# Patient Record
Sex: FEMALE | Race: BLACK OR AFRICAN AMERICAN | Employment: OTHER | URBAN - METROPOLITAN AREA
[De-identification: names, ages, dates, MRNs, and addresses within clinical notes are randomized per-mention and may not be internally consistent; named-entity substitution may affect disease eponyms.]

---

## 2017-01-03 ENCOUNTER — APPOINTMENT (OUTPATIENT)
Dept: GENERAL RADIOLOGY | Age: 77
End: 2017-01-03
Attending: PHYSICAL MEDICINE & REHABILITATION
Payer: MEDICARE

## 2017-01-03 PROCEDURE — 76000 FLUOROSCOPY <1 HR PHYS/QHP: CPT

## 2017-01-03 PROCEDURE — 72020 X-RAY EXAM OF SPINE 1 VIEW: CPT

## 2017-01-28 ENCOUNTER — HOSPITAL ENCOUNTER (OUTPATIENT)
Dept: MRI IMAGING | Age: 77
Discharge: HOME | End: 2017-01-28
Attending: PHYSICAL MEDICINE & REHABILITATION
Payer: MEDICARE

## 2017-01-28 DIAGNOSIS — M51.36 DDD (DEGENERATIVE DISC DISEASE), LUMBAR: ICD-10-CM

## 2017-01-28 DIAGNOSIS — M54.30 SCIATICA: ICD-10-CM

## 2017-01-28 PROCEDURE — 72148 MRI LUMBAR SPINE W/O DYE: CPT

## 2017-03-07 ENCOUNTER — OFFICE VISIT (OUTPATIENT)
Dept: INTERNAL MEDICINE CLINIC | Age: 77
End: 2017-03-07

## 2017-03-07 VITALS
OXYGEN SATURATION: 96 % | DIASTOLIC BLOOD PRESSURE: 84 MMHG | BODY MASS INDEX: 30.25 KG/M2 | HEIGHT: 61 IN | WEIGHT: 160.2 LBS | SYSTOLIC BLOOD PRESSURE: 142 MMHG | HEART RATE: 92 BPM | TEMPERATURE: 98.4 F

## 2017-03-07 DIAGNOSIS — M54.31 BILATERAL SCIATICA: ICD-10-CM

## 2017-03-07 DIAGNOSIS — Z00.00 ROUTINE GENERAL MEDICAL EXAMINATION AT A HEALTH CARE FACILITY: ICD-10-CM

## 2017-03-07 DIAGNOSIS — M85.80 OSTEOPENIA: ICD-10-CM

## 2017-03-07 DIAGNOSIS — Z13.39 SCREENING FOR ALCOHOLISM: ICD-10-CM

## 2017-03-07 DIAGNOSIS — M54.32 BILATERAL SCIATICA: ICD-10-CM

## 2017-03-07 DIAGNOSIS — I10 ESSENTIAL HYPERTENSION, BENIGN: ICD-10-CM

## 2017-03-07 DIAGNOSIS — Z23 ENCOUNTER FOR IMMUNIZATION: Primary | ICD-10-CM

## 2017-03-07 NOTE — PATIENT INSTRUCTIONS
Schedule of Personalized Health Plan - male  (Provide Copy to Patient)  The best way to stay healthy is to live a healthy lifestyle. A healthy lifestyle includes regular exercise, eating a well-balanced diet, keeping a healthy weight and not smoking. Regular physical exams and screening tests are another important way to take care of yourself. Preventive exams provided by health care providers can find health problems early when treatment works best and can keep you from getting certain diseases or illnesses. Preventive services include exams, lab tests, screenings, shots, monitoring and information to help you take care of your own health. All people over 65 should have a pneumonia shot. Pneumonia shots are usually only needed once in a lifetime unless your doctor decides differently. All people over 65 should have a yearly flu shot. People over 65 are at medium to high risk for Hepatitis B. Three shots are needed for complete protection. In addition to your physical exam, some screening tests are recommended:    Bone mass measurement (dexa scan) is recommended every two years if you have certain risk factors, such as personal history of vertebral fracture or chronic steroid medication use    Diabetes Mellitus screening is recommended every year. Glaucoma is an eye disease caused by high pressure in the eye. An eye exam is recommended every year. Cardiovascular screening tests that check your cholesterol and other blood fat (lipid) levels are recommended every five years. Colorectal Cancer screening tests help to find pre-cancerous polyps (growths in the colon) so they can be removed before they turn into cancer. Tests ordered for screening depend on your personal and family history risk factors.     Screening for Prostate Cancer is recommended yearly with a digital rectal exam and/or a PSA test    Here is a list of your current Health Maintenance items with a due date:  Health Maintenance Due   Topic Date Due    DTaP/Tdap/Td series (1 - Tdap) 03/28/1961    ZOSTER VACCINE AGE 60>  03/28/2000    Pneumococcal 65+ Low/Medium Risk (2 of 2 - PCV13) 11/20/2015    INFLUENZA AGE 9 TO ADULT  08/01/2016    MEDICARE YEARLY EXAM  01/12/2017

## 2017-03-07 NOTE — PROGRESS NOTES
HISTORY OF PRESENT ILLNESS  Maya Moncada is a 68 y.o. female. HPI     F/u HTN Prediabetes  Had lumbar ESTEVAN in January but will get another soon--Dr Mary Villanueva thi month  Still has sciatic pain in both legs  Able to walk for exercise around her yard  Still works full time  No cp or sob sxs    Patient Active Problem List    Diagnosis Date Noted    Prediabetes 08/30/2016    BPPV (benign paroxysmal positional vertigo) 03/19/2016    Osteopenia 01/12/2016    DDD (degenerative disc disease), lumbar 02/28/2014    Essential hypertension, benign 02/21/2010    Osteoporosis 02/21/2010    Depression 02/21/2010     Current Outpatient Prescriptions   Medication Sig Dispense Refill    lisinopril-hydroCHLOROthiazide (PRINZIDE, ZESTORETIC) 20-12.5 mg per tablet TAKE TWO TABLETS BY MOUTH DAILY 60 Tab 11    timolol (TIMOPTIC) 0.5 % ophthalmic solution       amLODIPine (NORVASC) 5 mg tablet Take 1 Tab by mouth daily. 30 Tab 11    ondansetron hcl (ZOFRAN, AS HYDROCHLORIDE,) 4 mg tablet Take 1 Tab by mouth every eight (8) hours as needed for Nausea. 15 Tab 1    meclizine (ANTIVERT) 25 mg tablet Take 1 Tab by mouth three (3) times daily as needed for Dizziness. 30 Tab 0    cholecalciferol (VITAMIN D3) 1,000 unit tablet Take 1,000 Units by mouth daily.  MULTIVITAMINS W-MINERALS/LUT (CENTRUM SILVER PO) Take 1 Tab by mouth daily.  aspirin 81 mg Tab Take 40.5 mg by mouth daily.  CALCIUM CARBONATE (CALTRATE 600 PO) Take 1 Tab by mouth two (2) times a day.  diazepam (VALIUM) 2 mg tablet Take one tablet by mouth every 8 hours as needed for severe dizziness 18 Tab 1    ketoconazole (NIZORAL) 2 % topical cream Apply  to affected area daily. 30 g 0    povidone-iodine (BETADINE) 10 % external solution Apply topically to scalp for about 5 min, twice a week.  1 Bottle 1     No Known Allergies   Lab Results  Component Value Date/Time   Hemoglobin A1c 6.1 08/30/2016 03:45 PM   Hemoglobin A1c 6.0 03/19/2016 03:51 AM Glucose 91 08/30/2016 03:45 PM   LDL, calculated 105.4 03/19/2016 03:51 AM   Creatinine 0.78 08/30/2016 03:45 PM      Lab Results  Component Value Date/Time   Cholesterol, total 188 03/19/2016 03:51 AM   HDL Cholesterol 61 03/19/2016 03:51 AM   LDL, calculated 105.4 03/19/2016 03:51 AM   Triglyceride 108 03/19/2016 03:51 AM   CHOL/HDL Ratio 3.1 03/19/2016 03:51 AM       Lab Results  Component Value Date/Time   GFR est AA 85 08/30/2016 03:45 PM   GFR est non-AA 74 08/30/2016 03:45 PM   Creatinine 0.78 08/30/2016 03:45 PM   BUN 14 08/30/2016 03:45 PM   Sodium 145 08/30/2016 03:45 PM   Potassium 4.0 08/30/2016 03:45 PM   Chloride 103 08/30/2016 03:45 PM   CO2 25 08/30/2016 03:45 PM         ROS    Physical Exam   Constitutional: She appears well-developed and well-nourished. Appears stated age   Cardiovascular: Normal rate, regular rhythm and normal heart sounds. Exam reveals no gallop and no friction rub. No murmur heard. Pulmonary/Chest: Effort normal and breath sounds normal. No respiratory distress. She has no wheezes. Abdominal: Soft. Bowel sounds are normal.   Musculoskeletal: She exhibits no edema. Neurological: She is alert. Psychiatric: She has a normal mood and affect. Nursing note and vitals reviewed. ASSESSMENT and PLAN  Dyllan Shi was seen today for annual wellness visit, hypertension and blood sugar problem. Diagnoses and all orders for this visit:    Encounter for immunization  -     Influenza virus vaccine ([address] 80) 72 years and older    Routine general medical examination at a health care facility    Screening for alcoholism    Bilateral sciatica    Essential hypertension, benign    Osteopenia      Follow-up Disposition:  Return in about 6 months (around 9/7/2017) for htn prediabetes.   This is a Subsequent Medicare Annual Wellness Visit providing Personalized Prevention Plan Services (PPPS) (Performed 12 months after initial AWV and PPPS )    I have reviewed the patient's medical history in detail and updated the computerized patient record. History     Past Medical History:   Diagnosis Date    Hypertension     Vertigo       Past Surgical History:   Procedure Laterality Date    Levonia Kobus  11/24/2015         COLORECTAL SCRN; HI RISK IND  11/24/2015         HX HYSTERECTOMY      HX OTHER SURGICAL      Fatty tumor removed L shoulder 2008    [place] COLSC FLX W/RMVL OF TUMOR POLYP LESION SNARE TQ  6/8/2010          Current Outpatient Prescriptions   Medication Sig Dispense Refill    lisinopril-hydroCHLOROthiazide (PRINZIDE, ZESTORETIC) 20-12.5 mg per tablet TAKE TWO TABLETS BY MOUTH DAILY 60 Tab 11    timolol (TIMOPTIC) 0.5 % ophthalmic solution       amLODIPine (NORVASC) 5 mg tablet Take 1 Tab by mouth daily. 30 Tab 11    ondansetron hcl (ZOFRAN, AS HYDROCHLORIDE,) 4 mg tablet Take 1 Tab by mouth every eight (8) hours as needed for Nausea. 15 Tab 1    meclizine (ANTIVERT) 25 mg tablet Take 1 Tab by mouth three (3) times daily as needed for Dizziness. 30 Tab 0    cholecalciferol (VITAMIN D3) 1,000 unit tablet Take 1,000 Units by mouth daily.  MULTIVITAMINS W-MINERALS/LUT (CENTRUM SILVER PO) Take 1 Tab by mouth daily.  aspirin 81 mg Tab Take 40.5 mg by mouth daily.  CALCIUM CARBONATE (CALTRATE 600 PO) Take 1 Tab by mouth two (2) times a day.  diazepam (VALIUM) 2 mg tablet Take one tablet by mouth every 8 hours as needed for severe dizziness 18 Tab 1    ketoconazole (NIZORAL) 2 % topical cream Apply  to affected area daily. 30 g 0    povidone-iodine (BETADINE) 10 % external solution Apply topically to scalp for about 5 min, twice a week.  1 Bottle 1     No Known Allergies  Family History   Problem Relation Age of Onset    Diabetes Mother     Heart Disease Mother     Hypertension Mother     Hypertension Father     Arthritis-rheumatoid Sister     Cancer Sister      stomach    Hypertension Sister     Arthritis-rheumatoid Brother     Hypertension Brother     Stroke Sister     Kidney Disease Brother     Cancer Daughter      colon     Social History   Substance Use Topics    Smoking status: Never Smoker    Smokeless tobacco: Never Used    Alcohol use No     Patient Active Problem List   Diagnosis Code    Essential hypertension, benign I10    Osteoporosis M81.0    Depression F32.9    DDD (degenerative disc disease), lumbar M51.36    Osteopenia M85.80    BPPV (benign paroxysmal positional vertigo) H81.10    Prediabetes R73.03       Depression Risk Factor Screening:     PHQ 2 / 9, over the last two weeks 3/7/2017   Little interest or pleasure in doing things Not at all   Feeling down, depressed or hopeless Not at all   Total Score PHQ 2 0     Alcohol Risk Factor Screening: On any occasion during the past 3 months, have you had more than 3 drinks containing alcohol? No    Do you average more than 7 drinks per week? No      Functional Ability and Level of Safety:     Hearing Loss   normal-to-mild    Activities of Daily Living   Self-care. Requires assistance with: no ADLs    Fall Risk     Fall Risk Assessment, last 12 mths 3/7/2017   Able to walk? Yes   Fall in past 12 months? No   Fall with injury? -   Number of falls in past 12 months -   Fall Risk Score -     Abuse Screen   None  Patient is not abused    Review of Systems   A comprehensive review of systems was negative except for that written in the HPI. Physical Examination     Evaluation of Cognitive Function:  Mood/affect:  happy  Appearance: age appropriate, well dressed and within normal Limits  Family member/caregiver input:     Visit Vitals    /84 (BP 1 Location: Left arm, BP Patient Position: Sitting)    Pulse 92    Temp 98.4 °F (36.9 °C) (Oral)    Ht 5' 1\" (1.549 m)    Wt 160 lb 3.2 oz (72.7 kg)    SpO2 96%    BMI 30.27 kg/m2     General:  Alert, cooperative, no distress, appears stated age. Head:  Normocephalic, without obvious abnormality, atraumatic. Eyes:  Conjunctivae/corneas clear. PERRL, EOMs intact. Fundi benign. Ears:  Normal TMs and external ear canals both ears. Nose: Nares normal. Septum midline. Mucosa normal. No drainage or sinus tenderness. Throat: Lips, mucosa, and tongue normal. Teeth and gums normal.   Neck: Supple, symmetrical, trachea midline, no adenopathy, thyroid: no enlargement/tenderness/nodules, no carotid bruit and no JVD. Back:   Symmetric, no curvature. ROM normal. No CVA tenderness. Lungs:   Clear to auscultation bilaterally. Chest wall:  No tenderness or deformity. Heart:  Regular rate and rhythm, S1, S2 normal, no murmur, click, rub or gallop. Breast Exam:  No tenderness, masses, or nipple abnormality. Abdomen:   Soft, non-tender. Bowel sounds normal. No masses,  No organomegaly. Genitalia:  Normal female without lesion, discharge or tenderness. Rectal:  Normal tone,  no masses or tenderness  Guaiac negative stool. Extremities: Extremities normal, atraumatic, no cyanosis or edema. Pulses: 2+ and symmetric all extremities. Skin: Skin color, texture, turgor normal. No rashes or lesions. Lymph nodes: Cervical, supraclavicular, and axillary nodes normal.   Neurologic: CNII-XII intact. Normal strength, sensation and reflexes throughout.        Patient Care Team:  Trinity Chandler MD as PCP - General Veronica Norman RN as Nurse Navigator  Dieter Moreno MD (Gastroenterology)  Jag Bach MD (Physical Medicine and Rehab)  Alondra Dawson MD (Orthopedic Surgery)  Junior Miah MD (Ophthalmology)  Dr. Jaleel Ferrell (Optometry)  Abdullahi Oconnor, NAIF as Ambulatory Care Navigator    Advice/Referrals/Counseling   Education and counseling provided:  End-of-Life planning (with patient's consent)--see ACP note  Pneumococcal Vaccine--prevnar 13 at local pharmacy advised  Influenza Vaccine--high dose flu shot today  Osteopenia--will discuss repeat dexa at future visit    Assessment/Plan   Alisia Nixon was seen today for annual wellness visit, hypertension and blood sugar problem. Diagnoses and all orders for this visit:    Encounter for immunization  -     Influenza virus vaccine ([address] 80) 72 years and older    HTN   controlled    Sciatica   F/u Dr Isabel Fu    Osteopenia   Continue calcium and vit d      RTC 6 months  Follow-up Disposition: Not on File.

## 2017-03-07 NOTE — MR AVS SNAPSHOT
Visit Information Date & Time Provider Department Dept. Phone Encounter #  
 3/7/2017  9:15 AM Gonzalo Yeh, [address] [phone] [id number] Follow-up Instructions Return in about 6 months (around 9/7/2017) for htn prediabetes. Upcoming Health Maintenance Date Due DTaP/Tdap/Td series (1 - Tdap) 3/28/1961 ZOSTER VACCINE AGE 60> 3/28/2000 Pneumococcal 65+ Low/Medium Risk (2 of 2 - PCV13) 11/20/2015 INFLUENZA AGE 9 TO ADULT 8/1/2016 MEDICARE YEARLY EXAM 1/12/2017 BREAST CANCER SCRN MAMMOGRAM 8/25/2017 GLAUCOMA SCREENING Q2Y 10/31/2018 COLONOSCOPY 11/24/2020 Allergies as of 3/7/2017  Review Complete On: 1/3/2017 By: Fabricio Scott RN No Known Allergies Current Immunizations  Never Reviewed Name Date Influenza High Dose Vaccine PF  Incomplete Influenza Vaccine 9/25/2014, 1/7/2014 Influenza Vaccine (Quad) PF 3/21/2016  4:50 PM  
 Influenza Vaccine Split 1/5/2011 Pneumococcal Polysaccharide (PPSV-23) 11/20/2014 Not reviewed this visit You Were Diagnosed With   
  
 Codes Comments Encounter for immunization    -  Primary ICD-10-CM: T04 ICD-9-CM: V03.89 Routine general medical examination at a health care facility     ICD-10-CM: Z00.00 ICD-9-CM: V70.0 Screening for alcoholism     ICD-10-CM: Z13.89 ICD-9-CM: V79.1 Vitals BP Pulse Temp Height(growth percentile) Weight(growth percentile) SpO2  
 142/84 (BP 1 Location: Left arm, BP Patient Position: Sitting) 92 98.4 °F (36.9 °C) (Oral) 5' 1\" (1.549 m) 160 lb 3.2 oz (72.7 kg) 96% BMI OB Status Smoking Status 30.27 kg/m2 Hysterectomy Never Smoker BMI and BSA Data Body Mass Index Body Surface Area  
 30.27 kg/m 2 1.77 m 2 Preferred Pharmacy Pharmacy Name Phone [facility] [address] , [address] [place] [address] [phone] Your Updated Medication List  
  
   
 This list is accurate as of: 3/7/17 10:24 AM.  Always use your most recent med list. amLODIPine 5 mg tablet Commonly known as:  [facility] Take 1 Tab by mouth daily. aspirin 81 mg tablet Take 40.5 mg by mouth daily. CALTRATE 600 PO Take 1 Tab by mouth two (2) times a day. CENTRUM SILVER PO Take 1 Tab by mouth daily. cholecalciferol 1,000 unit tablet Commonly known as:  VITAMIN D3 Take 1,000 Units by mouth daily. diazePAM 2 mg tablet Commonly known as:  VALIUM Take one tablet by mouth every 8 hours as needed for severe dizziness  
  
 ketoconazole 2 % topical cream  
Commonly known as:  NIZORAL Apply  to affected area daily. lisinopril-hydroCHLOROthiazide 20-12.5 mg per tablet Commonly known as:  PRINZIDE, ZESTORETIC  
TAKE TWO TABLETS BY MOUTH DAILY  
  
 meclizine 25 mg tablet Commonly known as:  ANTIVERT Take 1 Tab by mouth three (3) times daily as needed for Dizziness. ondansetron hcl 4 mg tablet Commonly known as:  ZOFRAN (AS HYDROCHLORIDE) Take 1 Tab by mouth every eight (8) hours as needed for Nausea. povidone-iodine 10 % external solution Commonly known as:  BETADINE Apply topically to scalp for about 5 min, twice a week. timolol 0.5 % ophthalmic solution Commonly known as:  TIMOPTIC We Performed the Following INFLUENZA VIRUS VACCINE, HIGH DOSE SEASONAL, PRESERVATIVE FREE [14597 CPT(R)] Follow-up Instructions Return in about 6 months (around 9/7/2017) for htn prediabetes. Introducing [facility]! Dear Jania Alfaro: 
Thank you for requesting a [employer] account. Our records indicate that you already have an active [employer] account. You can access your account anytime at [email or web address] Did you know that you can access your hospital and ER discharge instructions at any time in [employer]?   You can also review all of your test results from your hospital stay or ER visit. Additional Information If you have questions, please visit the Frequently Asked Questions section of the [employer] website at [email or web address] Remember, [employer] is NOT to be used for urgent needs. For medical emergencies, dial 911. Now available from your iPhone and Android! Please provide this summary of care documentation to your next provider. Your primary care clinician is listed as Vandana KEBEDE. If you have any questions after today's visit, please call [phone].

## 2017-04-24 NOTE — PERIOP NOTES
[facility]  Ambulatory Surgery Unit  Pre-operative Instructions    Procedure Date  4/25/17            Tentative Arrival Time 3501      1. On the day of your procedure, please report to the Ambulatory Surgery Unit Registration Desk and sign in at your designated time. The Ambulatory Surgery Unit is located in [facility] on the [facility] side of the [facility] across from the [address] [place] [address]. Please have all of your health insurance cards and a photo ID. 2. You must have someone with you to drive you home as directed by your surgeon. 3. You may have a light breakfast and take normal morning medications. 4. We recommend you do not drink any alcoholic beverages for 24 hours before and after your procedure. 5. Stop all Aspirin, non-steroidal anti-inflammatory drugs (i.e. Advil, Aleve), vitamins, and supplements as directed by your physician's office. **If you are currently taking Plavix, Coumadin, or other blood-thinning agents, contact your surgeon for instructions. **    6. In an effort to help prevent surgical site infection, we ask that you shower with an anti-bacterial soap (i.e. Dial or Safeguard) on the morning of your procedure. Do not apply any lotions, powders, or deodorants after showering. 7. Wear comfortable clothes. Wear glasses instead of contacts. Do not bring any jewelry or money (other than copays or fees as instructed). Do not wear make-up, particularly mascara, the morning of your procedure. Wear your hair loose or down, no ponytails, buns, yuni pins or clips. All body piercings must be removed. 8. You should understand that if you do not follow these instructions your procedure may be cancelled. If your physical condition changes (i.e. fever, cold or flu) please contact your surgeon as soon as possible. 9. It is important that you be on time.  If a situation occurs where you may be late, or if you have any questions or problems, please call [phone].    10. Your procedure time may be subject to change. You will receive a phone call the day prior to confirm your arrival time. I understand a pre-operative phone call will be made to verify my procedure time. In the event that I am not available, I give permission for a message to be left on my answering service and/or with another person? Yes    Instructions given during pat phone call.  Patient verbalized understanding.         ___________________      ___________________      ___________________  (Signature of Patient)          (Witness)                   (Date and Time)

## 2017-04-25 ENCOUNTER — APPOINTMENT (OUTPATIENT)
Dept: GENERAL RADIOLOGY | Age: 77
End: 2017-04-25
Attending: PHYSICAL MEDICINE & REHABILITATION
Payer: MEDICARE

## 2017-04-25 ENCOUNTER — HOSPITAL ENCOUNTER (OUTPATIENT)
Age: 77
Setting detail: OUTPATIENT SURGERY
Discharge: HOME | End: 2017-04-25
Attending: PHYSICAL MEDICINE & REHABILITATION | Admitting: PHYSICAL MEDICINE & REHABILITATION
Payer: MEDICARE

## 2017-04-25 VITALS
TEMPERATURE: 98.4 F | DIASTOLIC BLOOD PRESSURE: 101 MMHG | WEIGHT: 157 LBS | RESPIRATION RATE: 16 BRPM | OXYGEN SATURATION: 99 % | SYSTOLIC BLOOD PRESSURE: 159 MMHG | HEART RATE: 95 BPM | HEIGHT: 61 IN | BODY MASS INDEX: 29.64 KG/M2

## 2017-04-25 PROCEDURE — 74011000250 HC RX REV CODE- 250: Performed by: PHYSICAL MEDICINE & REHABILITATION

## 2017-04-25 PROCEDURE — 77030003665 HC NDL SPN BBMI -A: Performed by: PHYSICAL MEDICINE & REHABILITATION

## 2017-04-25 PROCEDURE — 72020 X-RAY EXAM OF SPINE 1 VIEW: CPT

## 2017-04-25 PROCEDURE — 74011636320 HC RX REV CODE- 636/320: Performed by: PHYSICAL MEDICINE & REHABILITATION

## 2017-04-25 PROCEDURE — 76210000046 HC AMBSU PH II REC FIRST 0.5 HR: Performed by: PHYSICAL MEDICINE & REHABILITATION

## 2017-04-25 PROCEDURE — 74011250636 HC RX REV CODE- 250/636: Performed by: PHYSICAL MEDICINE & REHABILITATION

## 2017-04-25 PROCEDURE — 76030000002 HC AMB SURG OR TIME FIRST 0.: Performed by: PHYSICAL MEDICINE & REHABILITATION

## 2017-04-25 PROCEDURE — 76000 FLUOROSCOPY <1 HR PHYS/QHP: CPT

## 2017-04-25 RX ORDER — BUPIVACAINE HYDROCHLORIDE 5 MG/ML
5 INJECTION, SOLUTION EPIDURAL; INTRACAUDAL ONCE
Status: DISCONTINUED | OUTPATIENT
Start: 2017-04-25 | End: 2017-04-25 | Stop reason: HOSPADM

## 2017-04-25 RX ORDER — LIDOCAINE HYDROCHLORIDE 20 MG/ML
8 INJECTION, SOLUTION INFILTRATION; PERINEURAL ONCE
Status: COMPLETED | OUTPATIENT
Start: 2017-04-25 | End: 2017-04-25

## 2017-04-25 RX ORDER — METHYLPREDNISOLONE ACETATE 40 MG/ML
40 INJECTION, SUSPENSION INTRA-ARTICULAR; INTRALESIONAL; INTRAMUSCULAR; SOFT TISSUE ONCE
Status: COMPLETED | OUTPATIENT
Start: 2017-04-25 | End: 2017-04-25

## 2017-04-25 NOTE — OP NOTES
Epidural Steroid Injection Operative Report    Indications: This is a 68 y.o. female who presents with low back pain. She was positive for LS DDD. The patient was admitted for surgery as conservative measures have failed. Date of Surgery: 4/25/2017    Preoperative Diagnosis: LS DDD    Postoperative Diagnosis: LS DDD    Surgeon(s) and Role:     * Dot Cullen MD - Primary     Procedure:  Procedure(s):  LEFT L4 L5 TRANSFORAMINAL EPIDURAL STEROID INJECTION    Procedure in Detail:  After appropriate informed consent was obtained, the patient was taken to the operating suite and placed in the prone position on the operating table on appropriate padding. The LS region was prepped and draped in the usual sterile fashion. Intraoperative fluoroscopy was used to localize the LS spine. The skin was infiltrated with 2% lidocaine. An 22-g needle was advanced into the Left L4 and L5 neuroforamen under fluoroscopic guidance. A small amount of contrast was injected into the epidural space, confirming appropriate needle placement on fluoroscopy. Next, 2ml of 2% lidocaine and 80mg of Depo-Medrol were injected. The needle was removed from the patient. The patient was then turned back into the supine position on the stretcher and was taken to the Recovery Room in stable condition.     Estimated Blood Loss:  none     Specimens: None       Drains: None          Complications:  None    Signed By: Dot Cullen MD                        April 25, 2017

## 2017-04-25 NOTE — H&P
Procedural Case Note    4/25/2017    (12:59 PM)    Mel Rossi    1940   (68 y.o.)    [id number]    CC:  pain    ROS:   Complete ROS obtained, no CP, no SOB, no N or V    PMH:     Past Medical History:   Diagnosis Date    Hypertension     Vertigo        ALLERGIES:   No Known Allergies    MEDS:     No current facility-administered medications for this encounter. Visit Vitals    BP (!) 141/95 (BP 1 Location: Right arm, BP Patient Position: At rest)    Pulse 95    Temp 98.7 °F (37.1 °C)    Resp 18    Ht 5' 1\" (1.549 m)    Wt 71.2 kg (157 lb)    SpO2 96%    BMI 29.66 kg/m2     PE:  Gen: NAD  Head: normocephalic  Heart: RRR  Lungs: CTA erinn  Abd: NT, ND, soft  Neuro: awake and alert  Skin: intact    IMPRESSION:   LS DDD    Note:  The clinical status was discussed in detail with the patient. The procedure was again discussed and described in detail. All understand and accept the planned procedure and risks; reject other forms of treatment. All questions are answered.     Von Chaves MD

## 2017-04-25 NOTE — IP AVS SNAPSHOT
[address] [facility] 
[phone] Patient: Evens Reyes MRN: [id number] LKI:7/90/1000 You are allergic to the following No active allergies Recent Documentation Height Weight BMI OB Status Smoking Status 1.549 m 71.2 kg 29.66 kg/m2 Hysterectomy Never Smoker Emergency Contacts Name Discharge Info Relation Home Work Mobile Cleatrice Woden   [phone] Wright Fuse  Other Relative [6] [phone] Booker Liu DISCHARGE CAREGIVER [3] Son [22] About your hospitalization You were admitted on:  April 25, 2017 You last received care in the:  [facility] [employer] You were discharged on:  April 25, 2017 Unit phone number:  [phone] Why you were hospitalized Your primary diagnosis was:  Not on File Providers Seen During Your Hospitalizations Provider Role Specialty Primary office phone Sun Longo MD Attending Provider Physical Medicine and Rehab [phone] Your Primary Care Physician (PCP) Primary Care Physician Office Phone Office Fax Anuel Adams [phone] Follow-up Information Follow up With Details Comments Contact Info Lorne Salter MD   [facility] [address] [facility] 
[phone] Current Discharge Medication List  
  
ASK your doctor about these medications Dose & Instructions Dispensing Information Comments Morning Noon Evening Bedtime  
 amLODIPine 5 mg tablet Commonly known as:  Kelly Beal Your last dose was: Your next dose is:    
   
   
 Dose:  5 mg Take 1 Tab by mouth daily. Quantity:  30 Tab Refills:  11  
     
   
   
   
  
 aspirin 81 mg tablet Your last dose was: Your next dose is:    
   
   
 Dose:  40.5 mg Take 40.5 mg by mouth daily. Refills:  0  
     
   
   
   
  
 CALTRATE 600 PO Your last dose was: Your next dose is:    
   
   
 Dose:  1 Tab Take 1 Tab by mouth two (2) times a day. Refills:  0 CENTRUM SILVER PO Your last dose was: Your next dose is:    
   
   
 Dose:  1 Tab Take 1 Tab by mouth daily. Refills:  0  
     
   
   
   
  
 cholecalciferol 1,000 unit tablet Commonly known as:  VITAMIN D3 Your last dose was: Your next dose is:    
   
   
 Dose:  1000 Units Take 1,000 Units by mouth daily. Refills:  0  
     
   
   
   
  
 diazePAM 2 mg tablet Commonly known as:  VALIUM Your last dose was: Your next dose is: Take one tablet by mouth every 8 hours as needed for severe dizziness Quantity:  18 Tab Refills:  1  
     
   
   
   
  
 lisinopril-hydroCHLOROthiazide 20-12.5 mg per tablet Commonly known as:  Luis Enrique Foster Your last dose was: Your next dose is: TAKE TWO TABLETS BY MOUTH DAILY Quantity:  60 Tab Refills:  11  
     
   
   
   
  
 meclizine 25 mg tablet Commonly known as:  ANTIVERT Your last dose was: Your next dose is:    
   
   
 Dose:  25 mg Take 1 Tab by mouth three (3) times daily as needed for Dizziness. Quantity:  30 Tab Refills:  0  
     
   
   
   
  
 ondansetron hcl 4 mg tablet Commonly known as:  ZOFRAN (AS HYDROCHLORIDE) Your last dose was: Your next dose is:    
   
   
 Dose:  4 mg Take 1 Tab by mouth every eight (8) hours as needed for Nausea. Quantity:  15 Tab Refills:  1 povidone-iodine 10 % external solution Commonly known as:  BETADINE Your last dose was: Your next dose is:    
   
   
 Apply topically to scalp for about 5 min, twice a week. Quantity:  1 Bottle Refills:  1  
     
   
   
   
  
 timolol 0.5 % ophthalmic solution Commonly known as:  TIMOPTIC Your last dose was: Your next dose is:    
   
   
  Refills:  0 Discharge Instructions Dr. Jonn Jackson Discharge Instructions Transforaminal Epidural Steroid Injection/ Selective Nerve Block You had a transforaminal epidural steroid injection/ selective nerve block today. You will probably have some numbness, and possibly weakness, in your leg for the next 6 to 8 hours. The steroids will slowly become effective, reducing your pain, over the next 2 weeks. You should begin feeling better after a few days, but it may take up to 2 weeks to notice the difference. The benefit you get from your injection will last a variable amount of time, depending on the severity of your lumbar spine problem. ? Pain: Most people do not have any increase in pain after this injection. However, you might experience some soreness in your low back. If this happens, putting an ice pack over the sore area will help. ? Bandage: You will have a small bandage covering the site of the injection. You may remove it once you get home. ? Restrictions: Someone should drive you home after the injection. After that, you have no restrictions. You need to be careful while walking, as you may still have some numbness or weakness in your leg. You may resume your normal level of activity. You may take a shower or bath, and you may eat normally. You should continue your current exercises and/or therapy routine. ?  Medications: Continue your current medications as prescribed. If your pain decreases, you may reduce the amount of your pain medicines. If you stopped taking anticoagulants or blood-thinners before the injection, start them tomorrow. If you have diabetes, your blood sugar may be elevated for a few days. Call your primary doctor with any questions. Call Dr. Jonn Jackson at [phone] if you experience: ? Fever (101 degrees Fahrenheit or greater) ? Nausea or vomiting 
? Headache unrelieved by your normal pain medicine ? Redness or swelling at the injection site that lasts more than 1 day ? New numbness, tingling, weakness, or pain that you didnt have before the injection Follow-up appointment: 
? Call [phone] to schedule an appointment for 2-4 weeks DISCHARGE SUMMARY from Nurse The following personal items collected during your admission are returned to you:  
Dental Appliance: Dental Appliances: None Vision:   
Hearing Aid:   
Jewelry: Jewelry: Necklace, With patient Clothing: Clothing: Jacket/Coat (placed under stretcher) Other Valuables: Other Valuables: None Valuables sent to safe: If you were given prescriptions, please review the written information on prescribed medications. · You will receive a Post Operative Call from one of the Recovery Room Nurses on the day after your surgery to check on you. It is very important for us to know how you are recovering after your surgery. · You may receive an e-mail or letter in the mail from [place] regarding your experience with us in the Ambulatory Surgery Unit. Your feedback is valuable to us and we appreciate your participation in the survey. If you have not had your influenza or pneumococcal vaccines, please follow up with your primary care physician. The discharge information has been reviewed with the patient. The patient verbalized understanding. Discharge Orders None ACO Transitions of Care Introducing [employer] [address] Tara Nina offers a voluntary care coordination program to provide high quality service and care to [facility] fee-for-service beneficiaries. Neli Burks was designed to help you enhance your health and well-being through the following services: ? Transitions of Care  support for individuals who are transitioning from one care setting to another (example: Hospital to home). ? Chronic and Complex Care Coordination  support for individuals and caregivers of those with serious or chronic illnesses or with more than one chronic (ongoing) condition and those who take a number of different medications. If you meet specific medical criteria, a Replaced by [facility] may call you directly to coordinate your care with your primary care physician and your other care providers. For questions about the [facility] programs, please, contact your physicians office. For general questions or additional information about Accountable Care Organizations: 
Please visit www.medicare.gov/acos. html or call 1-800-MEDICARE (2-753.456.4032) TTY users should call 5-504.758.2534. Introducing [facility] & HEALTH SERVICES! Dear Jose Enrique Alicae: 
Thank you for requesting a [employer] account. Our records indicate that you already have an active NetDocuments account. You can access your account anytime at [email or web address] Did you know that you can access your hospital and ER discharge instructions at any time in NetDocuments? You can also review all of your test results from your hospital stay or ER visit. Additional Information If you have questions, please visit the Frequently Asked Questions section of the NetDocuments website at [email or web address] Remember, NetDocuments is NOT to be used for urgent needs. For medical emergencies, dial 911. Now available from your iPhone and Android! General Information Please provide this summary of care documentation to your next provider. Patient Signature:  ____________________________________________________________ Date:  ____________________________________________________________  
  
Edmar Breath Provider Signature:  ____________________________________________________________ Date:  ____________________________________________________________

## 2017-04-25 NOTE — PERIOP NOTES
Skin assessment:   WNL   Skin color: normal for ethicity   Skin condition: intact; pt denies any cuts or bruises   Skin integrity: WNL   Turgor: normal for age    Neuro:  Push/Pull assessment:     LUE Response: strong    LLE Response: strong push and pull   RUE Response: strong    RLE Response: strong push and pull equal to the right    OB/Gyn assessment:    LMP: n/a    If no menstrual period then reason: post menopausal

## 2017-04-25 NOTE — DISCHARGE INSTRUCTIONS
Dr. Evangelista Hawkins Discharge Instructions  Transforaminal Epidural Steroid Injection/ Selective Nerve Block    You had a transforaminal epidural steroid injection/ selective nerve block today. You will probably have some numbness, and possibly weakness, in your leg for the next 6 to 8 hours. The steroids will slowly become effective, reducing your pain, over the next 2 weeks. You should begin feeling better after a few days, but it may take up to 2 weeks to notice the difference. The benefit you get from your injection will last a variable amount of time, depending on the severity of your lumbar spine problem.  Pain: Most people do not have any increase in pain after this injection. However, you might experience some soreness in your low back. If this happens, putting an ice pack over the sore area will help.  Bandage: You will have a small bandage covering the site of the injection. You may remove it once you get home.  Restrictions: Someone should drive you home after the injection. After that, you have no restrictions. You need to be careful while walking, as you may still have some numbness or weakness in your leg. You may resume your normal level of activity. You may take a shower or bath, and you may eat normally. You should continue your current exercises and/or therapy routine.   Medications: Continue your current medications as prescribed. If your pain decreases, you may reduce the amount of your pain medicines. If you stopped taking anticoagulants or blood-thinners before the injection, start them tomorrow. If you have diabetes, your blood sugar may be elevated for a few days. Call your primary doctor with any questions.   Call Dr. Evangelista Hawkins at [phone] if you experience:   Fever (101 degrees Fahrenheit or greater)   Nausea or vomiting   Headache unrelieved by your normal pain medicine   Redness or swelling at the injection site that lasts more than 1 day   New numbness, tingling, weakness, or pain that you didnt have before the injection    Follow-up appointment:   Call [phone] to schedule an appointment for 2-4 weeks        DISCHARGE SUMMARY from Nurse    The following personal items collected during your admission are returned to you:   Dental Appliance: Dental Appliances: None  Vision:    Hearing Aid:    Jewelry: Jewelry: Necklace, With patient  Clothing: Clothing: Jacket/Coat (placed under stretcher)  Other Valuables: Other Valuables: None  Valuables sent to safe: If you were given prescriptions, please review the written information on prescribed medications. · You will receive a Post Operative Call from one of the Recovery Room Nurses on the day after your surgery to check on you. It is very important for us to know how you are recovering after your surgery. · You may receive an e-mail or letter in the mail from [place] regarding your experience with us in the Ambulatory Surgery Unit. Your feedback is valuable to us and we appreciate your participation in the survey. If you have not had your influenza or pneumococcal vaccines, please follow up with your primary care physician. The discharge information has been reviewed with the patient. The patient verbalized understanding.

## 2017-04-25 NOTE — PERIOP NOTES
Patient: Atul Shah MRN: [id number]  SSN: [id number]   YOB: 1940  Age: 68 y.o. Sex: female     Patient is status post Procedure(s):  LEFT L4 L5 TRANSFORAMINAL EPIDURAL STEROID INJECTION.     Surgeon(s) and Role:     * Janee Galvan MD - Primary    Local/Dose/Irrigation:  SEE MAR                  Peripheral IV 03/18/16 Right Antecubital (Active)

## 2017-04-25 NOTE — PERIOP NOTES
PATIENT DENIES PAIN WHEN ASSESSED BY DR. Sulema Ramsey. PATIENT TOLERATING PROCEDURE WITHOUT DIFFICULTY OR COMPLAINT.

## 2017-11-13 RX ORDER — LISINOPRIL AND HYDROCHLOROTHIAZIDE 12.5; 2 MG/1; MG/1
TABLET ORAL
Qty: 60 TAB | Refills: 10 | Status: SHIPPED | OUTPATIENT
Start: 2017-11-13 | End: 2017-12-11 | Stop reason: SDUPTHER

## 2017-12-11 RX ORDER — AMLODIPINE BESYLATE 5 MG/1
5 TABLET ORAL DAILY
Qty: 90 TAB | Refills: 3 | Status: SHIPPED | OUTPATIENT
Start: 2017-12-11 | End: 2018-12-04 | Stop reason: SDUPTHER

## 2017-12-11 RX ORDER — LISINOPRIL AND HYDROCHLOROTHIAZIDE 12.5; 2 MG/1; MG/1
TABLET ORAL
Qty: 180 TAB | Refills: 3 | Status: SHIPPED | OUTPATIENT
Start: 2017-12-11 | End: 2018-12-04 | Stop reason: SDUPTHER

## 2017-12-11 NOTE — TELEPHONE ENCOUNTER
Pt is in need of a refill on \"Lisinopril-20-12.5mg\" and \"Amlodipine-5mg\" sent to [facility] on [address]. Best Contact [phone].        Message received & copied from [facility]

## 2017-12-28 ENCOUNTER — OFFICE VISIT (OUTPATIENT)
Dept: INTERNAL MEDICINE CLINIC | Age: 77
End: 2017-12-28

## 2017-12-28 ENCOUNTER — HOSPITAL ENCOUNTER (OUTPATIENT)
Dept: LAB | Age: 77
Discharge: HOME | End: 2017-12-28
Payer: MEDICARE

## 2017-12-28 VITALS
DIASTOLIC BLOOD PRESSURE: 80 MMHG | OXYGEN SATURATION: 100 % | BODY MASS INDEX: 30.96 KG/M2 | WEIGHT: 164 LBS | HEART RATE: 83 BPM | HEIGHT: 61 IN | SYSTOLIC BLOOD PRESSURE: 118 MMHG | TEMPERATURE: 98 F

## 2017-12-28 DIAGNOSIS — I10 ESSENTIAL HYPERTENSION, BENIGN: Primary | ICD-10-CM

## 2017-12-28 DIAGNOSIS — R73.09 ELEVATED GLUCOSE: ICD-10-CM

## 2017-12-28 DIAGNOSIS — Z23 ENCOUNTER FOR IMMUNIZATION: ICD-10-CM

## 2017-12-28 DIAGNOSIS — R73.03 PREDIABETES: ICD-10-CM

## 2017-12-28 PROCEDURE — 80061 LIPID PANEL: CPT

## 2017-12-28 PROCEDURE — 85027 COMPLETE CBC AUTOMATED: CPT

## 2017-12-28 PROCEDURE — 83036 HEMOGLOBIN GLYCOSYLATED A1C: CPT

## 2017-12-28 PROCEDURE — 80048 BASIC METABOLIC PNL TOTAL CA: CPT

## 2017-12-28 PROCEDURE — 36415 COLL VENOUS BLD VENIPUNCTURE: CPT

## 2017-12-28 RX ORDER — AMOXICILLIN 500 MG/1
TABLET, FILM COATED ORAL
COMMUNITY
End: 2017-12-28 | Stop reason: SDUPTHER

## 2017-12-28 RX ORDER — GABAPENTIN 100 MG/1
100 CAPSULE ORAL
COMMUNITY
End: 2022-04-11

## 2017-12-28 RX ORDER — AMOXICILLIN 500 MG/1
500 CAPSULE ORAL 3 TIMES DAILY
COMMUNITY
End: 2018-07-09 | Stop reason: ALTCHOICE

## 2017-12-28 RX ORDER — CLOTRIMAZOLE AND BETAMETHASONE DIPROPIONATE 10; .64 MG/G; MG/G
CREAM TOPICAL 2 TIMES DAILY
COMMUNITY
End: 2022-04-11 | Stop reason: ALTCHOICE

## 2017-12-28 RX ORDER — GABAPENTIN 300 MG/1
300 CAPSULE ORAL
COMMUNITY

## 2017-12-28 NOTE — MR AVS SNAPSHOT
Visit Information Date & Time Provider Department Dept. Phone Encounter #  
 12/28/2017  1:45 PM Evon Donahue, [address] [phone] [id number] Follow-up Instructions Return in about 6 months (around 6/28/2018) for htn prediaebtes wellness. Upcoming Health Maintenance Date Due DTaP/Tdap/Td series (1 - Tdap) 3/28/1961 ZOSTER VACCINE AGE 60> 1/28/2000 Pneumococcal 65+ Low/Medium Risk (2 of 2 - PCV13) 11/20/2015 BREAST CANCER SCRN MAMMOGRAM 8/25/2017 MEDICARE YEARLY EXAM 3/8/2018 GLAUCOMA SCREENING Q2Y 5/3/2019 Allergies as of 12/28/2017  Review Complete On: 12/28/2017 By: Marlon March LPN No Known Allergies Current Immunizations  Never Reviewed Name Date Influenza High Dose Vaccine PF 3/7/2017 Influenza Vaccine 9/25/2014, 1/7/2014 Influenza Vaccine (Quad) PF 3/21/2016  4:50 PM  
 Influenza Vaccine Split 1/5/2011 Pneumococcal Conjugate (PCV-13)  Incomplete Pneumococcal Polysaccharide (PPSV-23) 11/20/2014 Not reviewed this visit You Were Diagnosed With   
  
 Codes Comments Essential hypertension, benign    -  Primary ICD-10-CM: I10 
ICD-9-CM: 401.1 Prediabetes     ICD-10-CM: R73.03 
ICD-9-CM: 790.29 Elevated glucose     ICD-10-CM: R73.09 
ICD-9-CM: 790.29 Encounter for immunization     ICD-10-CM: X85 ICD-9-CM: V03.89 Vitals BP Pulse Temp Height(growth percentile) Weight(growth percentile) SpO2  
 118/80 83 98 °F (36.7 °C) (Oral) 5' 1\" (1.549 m) 164 lb (74.4 kg) 100% BMI OB Status Smoking Status 30.99 kg/m2 Hysterectomy Never Smoker Vitals History BMI and BSA Data Body Mass Index Body Surface Area 30.99 kg/m 2 1.79 m 2 Preferred Pharmacy Pharmacy Name Phone  [facility], [address] [place] [address] [phone] Your Updated Medication List  
  
   
 This list is accurate as of: 12/28/17  2:08 PM.  Always use your most recent med list. amLODIPine 5 mg tablet Commonly known as:  Horris Bills Take 1 Tab by mouth daily. amoxicillin 500 mg capsule Commonly known as:  AMOXIL Take 500 mg by mouth three (3) times daily. aspirin 81 mg tablet Take 40.5 mg by mouth daily. CALTRATE 600 PO Take 1 Tab by mouth two (2) times a day. CENTRUM SILVER PO Take 1 Tab by mouth daily. cholecalciferol 1,000 unit tablet Commonly known as:  VITAMIN D3 Take 1,000 Units by mouth daily. clotrimazole-betamethasone topical cream  
Commonly known as:  Licha More Apply  to affected area two (2) times a day. diazePAM 2 mg tablet Commonly known as:  VALIUM Take one tablet by mouth every 8 hours as needed for severe dizziness * gabapentin 100 mg capsule Commonly known as:  NEURONTIN Take  by mouth three (3) times daily. * gabapentin 300 mg capsule Commonly known as:  NEURONTIN Take 300 mg by mouth three (3) times daily. lisinopril-hydroCHLOROthiazide 20-12.5 mg per tablet Commonly known as:  PRINZIDE, ZESTORETIC  
TAKE TWO TABLETS BY MOUTH DAILY  
  
 timolol 0.5 % ophthalmic solution Commonly known as:  TIMOPTIC * Notice: This list has 2 medication(s) that are the same as other medications prescribed for you. Read the directions carefully, and ask your doctor or other care provider to review them with you. We Performed the Following CBC W/O DIFF [89871 CPT(R)] HEMOGLOBIN A1C WITH EAG [88552 CPT(R)] LIPID PANEL [81443 CPT(R)] METABOLIC PANEL, BASIC [10335 CPT(R)] PNEUMOCOCCAL CONJ VACCINE 13 VALENT IM [id number] CPT(R)] Follow-up Instructions Return in about 6 months (around 6/28/2018) for htn prediaebtes wellness. Introducing [facility] & HEALTH SERVICES! Dear Michael Cee: 
Thank you for requesting a [employer] account.   Our records indicate that you already have an active [employer] account. You can access your account anytime at [email or web address] [email or web address] Did you know that you can access your hospital and ER discharge instructions at any time in [employer]? You can also review all of your test results from your hospital stay or ER visit. Additional Information If you have questions, please visit the Frequently Asked Questions section of the [employer] website at [email or web address] [email or web address] Remember, [employer] is NOT to be used for urgent needs. For medical emergencies, dial 911. Now available from your iPhone and Android! Please provide this summary of care documentation to your next provider. Your primary care clinician is listed as Brisa KEBEDE. If you have any questions after today's visit, please call [phone].

## 2017-12-28 NOTE — PROGRESS NOTES
HISTORY OF PRESENT ILLNESS  Justin Mckeon is a 68 y.o. female. HPI   F/u HTN Prediabetes  Has left ear infection--saw ENT--on amoxil and fungal crm now  Had lumbar ESTEVAN in January and April --imporved overall  Less left leg pain and back pain  Able to walk for exercise around her yard  Still works full time-- [employer]   No cp or sob sxs       Patient Active Problem List    Diagnosis Date Noted    Prediabetes 08/30/2016    BPPV (benign paroxysmal positional vertigo) 03/19/2016    Osteopenia 01/12/2016    DDD (degenerative disc disease), lumbar 02/28/2014    Essential hypertension, benign 02/21/2010    Osteoporosis 02/21/2010    Depression 02/21/2010     Current Outpatient Prescriptions   Medication Sig Dispense Refill    gabapentin (NEURONTIN) 100 mg capsule Take  by mouth three (3) times daily.  gabapentin (NEURONTIN) 300 mg capsule Take 300 mg by mouth three (3) times daily.  clotrimazole-betamethasone (LOTRISONE) topical cream Apply  to affected area two (2) times a day.  amoxicillin (AMOXIL) 500 mg capsule Take 500 mg by mouth three (3) times daily.  lisinopril-hydroCHLOROthiazide (PRINZIDE, ZESTORETIC) 20-12.5 mg per tablet TAKE TWO TABLETS BY MOUTH DAILY 180 Tab 3    amLODIPine (NORVASC) 5 mg tablet Take 1 Tab by mouth daily. 90 Tab 3    timolol (TIMOPTIC) 0.5 % ophthalmic solution       diazepam (VALIUM) 2 mg tablet Take one tablet by mouth every 8 hours as needed for severe dizziness 18 Tab 1    cholecalciferol (VITAMIN D3) 1,000 unit tablet Take 1,000 Units by mouth daily.  MULTIVITAMINS W-MINERALS/LUT (CENTRUM SILVER PO) Take 1 Tab by mouth daily.  aspirin 81 mg Tab Take 40.5 mg by mouth daily.  CALCIUM CARBONATE (CALTRATE 600 PO) Take 1 Tab by mouth two (2) times a day.  ondansetron hcl (ZOFRAN, AS HYDROCHLORIDE,) 4 mg tablet Take 1 Tab by mouth every eight (8) hours as needed for Nausea.  15 Tab 1    meclizine (ANTIVERT) 25 mg tablet Take 1 Tab by mouth three (3) times daily as needed for Dizziness. 30 Tab 0    povidone-iodine (BETADINE) 10 % external solution Apply topically to scalp for about 5 min, twice a week. 1 Bottle 1     No Known Allergies   Lab Results  Component Value Date/Time   Hemoglobin A1c 6.1 08/30/2016 03:45 PM   Hemoglobin A1c 6.0 03/19/2016 03:51 AM   Glucose 91 08/30/2016 03:45 PM   LDL, calculated 105.4 03/19/2016 03:51 AM   Creatinine 0.78 08/30/2016 03:45 PM      Lab Results  Component Value Date/Time   Cholesterol, total 188 03/19/2016 03:51 AM   HDL Cholesterol 61 03/19/2016 03:51 AM   LDL, calculated 105.4 03/19/2016 03:51 AM   Triglyceride 108 03/19/2016 03:51 AM   CHOL/HDL Ratio 3.1 03/19/2016 03:51 AM     Lab Results  Component Value Date/Time   GFR est non-AA 74 08/30/2016 03:45 PM   GFR est AA 85 08/30/2016 03:45 PM   Creatinine 0.78 08/30/2016 03:45 PM   BUN 14 08/30/2016 03:45 PM   Sodium 145 08/30/2016 03:45 PM   Potassium 4.0 08/30/2016 03:45 PM   Chloride 103 08/30/2016 03:45 PM   CO2 25 08/30/2016 03:45 PM   Magnesium 2.2 05/08/2015 07:42 AM        ROS    Physical Exam   Constitutional: She appears well-developed and well-nourished. Appears stated age   Cardiovascular: Normal rate, regular rhythm and normal heart sounds. Exam reveals no gallop and no friction rub. No murmur heard. Pulmonary/Chest: Effort normal and breath sounds normal. No respiratory distress. She has no wheezes. Abdominal: Soft. Bowel sounds are normal.   Musculoskeletal: She exhibits no edema. Neurological: She is alert. Psychiatric: She has a normal mood and affect. Nursing note and vitals reviewed. ASSESSMENT and PLAN  Diagnoses and all orders for this visit:    1. Essential hypertension, benign  -     CBC W/O DIFF  -     METABOLIC PANEL, BASIC  -     LIPID PANEL   Good control  2. Prediabetes   F/u a1c  3. Elevated glucose  -     HEMOGLOBIN A1C WITH EAG    4.  Encounter for immunization  -     Pneumococcal conjugate 13 BEN maldonado (PREVNAR-13)    5. Screening   Encouraged pt get her mammogram [facility] at [facility] and Tdap vaccines  Follow-up Disposition:  Return in about 6 months (around 6/28/2018) for htn prediaebtes wellness.

## 2017-12-29 LAB
BUN SERPL-MCNC: 13 MG/DL (ref 8–27)
BUN/CREAT SERPL: 16 (ref 12–28)
CALCIUM SERPL-MCNC: 9.1 MG/DL (ref 8.7–10.3)
CHLORIDE SERPL-SCNC: 100 MMOL/L (ref 96–106)
CHOLEST SERPL-MCNC: 198 MG/DL (ref 100–199)
CO2 SERPL-SCNC: 25 MMOL/L (ref 18–29)
CREAT SERPL-MCNC: 0.81 MG/DL (ref 0.57–1)
ERYTHROCYTE [DISTWIDTH] IN BLOOD BY AUTOMATED COUNT: 14.6 % (ref 12.3–15.4)
EST. AVERAGE GLUCOSE BLD GHB EST-MCNC: 120 MG/DL
GLUCOSE SERPL-MCNC: 90 MG/DL (ref 65–99)
HBA1C MFR BLD: 5.8 % (ref 4.8–5.6)
HCT VFR BLD AUTO: 36.6 % (ref 34–46.6)
HDLC SERPL-MCNC: 54 MG/DL
HGB BLD-MCNC: 12 G/DL (ref 11.1–15.9)
LDLC SERPL CALC-MCNC: 119 MG/DL (ref 0–99)
MCH RBC QN AUTO: 26.8 PG (ref 26.6–33)
MCHC RBC AUTO-ENTMCNC: 32.8 G/DL (ref 31.5–35.7)
MCV RBC AUTO: 82 FL (ref 79–97)
PLATELET # BLD AUTO: 274 X10E3/UL (ref 150–379)
POTASSIUM SERPL-SCNC: 4 MMOL/L (ref 3.5–5.2)
RBC # BLD AUTO: 4.48 X10E6/UL (ref 3.77–5.28)
SODIUM SERPL-SCNC: 141 MMOL/L (ref 134–144)
TRIGL SERPL-MCNC: 126 MG/DL (ref 0–149)
VLDLC SERPL CALC-MCNC: 25 MG/DL (ref 5–40)
WBC # BLD AUTO: 3.5 X10E3/UL (ref 3.4–10.8)

## 2018-07-09 ENCOUNTER — HOSPITAL ENCOUNTER (OUTPATIENT)
Dept: LAB | Age: 78
Discharge: HOME | End: 2018-07-09
Payer: MEDICARE

## 2018-07-09 ENCOUNTER — OFFICE VISIT (OUTPATIENT)
Dept: INTERNAL MEDICINE CLINIC | Age: 78
End: 2018-07-09

## 2018-07-09 VITALS
WEIGHT: 160 LBS | HEART RATE: 87 BPM | DIASTOLIC BLOOD PRESSURE: 84 MMHG | SYSTOLIC BLOOD PRESSURE: 134 MMHG | OXYGEN SATURATION: 99 % | TEMPERATURE: 97.9 F | HEIGHT: 61 IN | BODY MASS INDEX: 30.21 KG/M2

## 2018-07-09 DIAGNOSIS — I10 ESSENTIAL HYPERTENSION, BENIGN: Primary | ICD-10-CM

## 2018-07-09 DIAGNOSIS — H81.10 BENIGN PAROXYSMAL POSITIONAL VERTIGO, UNSPECIFIED LATERALITY: ICD-10-CM

## 2018-07-09 DIAGNOSIS — Z00.00 MEDICARE ANNUAL WELLNESS VISIT, SUBSEQUENT: ICD-10-CM

## 2018-07-09 DIAGNOSIS — M85.80 OSTEOPENIA, UNSPECIFIED LOCATION: ICD-10-CM

## 2018-07-09 DIAGNOSIS — Z78.0 MENOPAUSE: ICD-10-CM

## 2018-07-09 DIAGNOSIS — R73.03 PREDIABETES: ICD-10-CM

## 2018-07-09 PROCEDURE — 83036 HEMOGLOBIN GLYCOSYLATED A1C: CPT

## 2018-07-09 PROCEDURE — 80048 BASIC METABOLIC PNL TOTAL CA: CPT

## 2018-07-09 PROCEDURE — 36415 COLL VENOUS BLD VENIPUNCTURE: CPT

## 2018-07-09 RX ORDER — DIAZEPAM 2 MG/1
TABLET ORAL
Qty: 18 TAB | Refills: 1 | Status: SHIPPED | OUTPATIENT
Start: 2018-07-09 | End: 2022-04-11 | Stop reason: ALTCHOICE

## 2018-07-09 NOTE — MR AVS SNAPSHOT
Ben [address] 
[phone] Patient: Viv Pablo MRN:  [id number] Visit Information Date & Time Provider Department Dept. Phone Encounter #  
 7/9/2018  1:30 PM Roldan Cortez, [address] [place] [address],[address] [phone] [id number] Follow-up Instructions Return in about 6 months (around 1/9/2019) for htn prediabetes. Upcoming Health Maintenance Date Due DTaP/Tdap/Td series (1 - Tdap) 3/28/1961 ZOSTER VACCINE AGE 60> 1/28/2000 BREAST CANCER SCRN MAMMOGRAM 8/25/2017 MEDICARE YEARLY EXAM 3/14/2018 Influenza Age 5 to Adult 8/1/2018 GLAUCOMA SCREENING Q2Y 5/14/2020 Allergies as of 7/9/2018  Review Complete On: 7/9/2018 By: Ashley Barksdale LPN No Known Allergies Current Immunizations  Never Reviewed Name Date Influenza High Dose Vaccine PF 3/7/2017 Influenza Vaccine 9/25/2014, 1/7/2014 Influenza Vaccine (Quad) PF 3/21/2016  4:50 PM  
 Influenza Vaccine Split 1/5/2011 Pneumococcal Conjugate (PCV-13) 12/28/2017 Pneumococcal Polysaccharide (PPSV-23) 11/20/2014 Not reviewed this visit You Were Diagnosed With   
  
 Codes Comments Essential hypertension, benign    -  Primary ICD-10-CM: I10 
ICD-9-CM: 401.1 Prediabetes     ICD-10-CM: R73.03 
ICD-9-CM: 790.29 Osteopenia, unspecified location     ICD-10-CM: M85.80 ICD-9-CM: 733.90 Benign paroxysmal positional vertigo, unspecified laterality     ICD-10-CM: H81.10 ICD-9-CM: 386.11 Menopause     ICD-10-CM: Z78.0 ICD-9-CM: 627.2 Vitals BP Pulse Temp Height(growth percentile) Weight(growth percentile) SpO2  
 134/84 87 97.9 °F (36.6 °C) (Oral) 5' 1\" (1.549 m) 160 lb (72.6 kg) 99% BMI OB Status Smoking Status 30.23 kg/m2 Hysterectomy Never Smoker Vitals History BMI and BSA Data  Body Mass Index Body Surface Area  
 30.23 kg/m 2 1.77 m 2  
  
 Preferred Pharmacy Pharmacy Name Phone RAZA PABLO [facility] [address] [facility] [address], [address] [facility] [phone] Your Updated Medication List  
  
   
This list is accurate as of 7/9/18  1:51 PM.  Always use your most recent med list. amLODIPine 5 mg tablet Commonly known as:  Suzon Salle Take 1 Tab by mouth daily. aspirin 81 mg tablet Take 40.5 mg by mouth daily. CALTRATE 600 PO Take 1 Tab by mouth two (2) times a day. CENTRUM SILVER PO Take 1 Tab by mouth daily. cholecalciferol 1,000 unit tablet Commonly known as:  VITAMIN D3 Take 1,000 Units by mouth daily. clotrimazole-betamethasone topical cream  
Commonly known as:  Alphonsa Spies Apply  to affected area two (2) times a day. diazePAM 2 mg tablet Commonly known as:  VALIUM Take one tablet by mouth every 8 hours as needed for severe dizziness * gabapentin 100 mg capsule Commonly known as:  NEURONTIN Take  by mouth three (3) times daily. * gabapentin 300 mg capsule Commonly known as:  NEURONTIN Take 300 mg by mouth three (3) times daily. lisinopril-hydroCHLOROthiazide 20-12.5 mg per tablet Commonly known as:  PRINZIDE, ZESTORETIC  
TAKE TWO TABLETS BY MOUTH DAILY  
  
 timolol 0.5 % ophthalmic solution Commonly known as:  TIMOPTIC * Notice: This list has 2 medication(s) that are the same as other medications prescribed for you. Read the directions carefully, and ask your doctor or other care provider to review them with you. Prescriptions Printed Refills  
 diazePAM (VALIUM) 2 mg tablet 1 Sig: Take one tablet by mouth every 8 hours as needed for severe dizziness Class: Print We Performed the Following HEMOGLOBIN A1C WITH EAG [79713 CPT(R)] METABOLIC PANEL, BASIC [83665 CPT(R)] Follow-up Instructions Return in about 6 months (around 1/9/2019) for htn prediabetes. To-Do List   
 07/16/2018 Imaging:  DEXA BONE DENSITY STUDY AXIAL Patient Instructions Medicare Wellness Visit, Female The best way to live healthy is to have a lifestyle where you eat a well-balanced diet, exercise regularly, limit alcohol use, and quit all forms of tobacco/nicotine, if applicable. Regular preventive services are another way to keep healthy. Preventive services (vaccines, screening tests, monitoring & exams) can help personalize your care plan, which helps you manage your own care. Screening tests can find health problems at the earliest stages, when they are easiest to treat. [facility] follows the current, evidence-based guidelines published by the [facility] ([facility]) when recommending preventive services for our patients. Because we follow these guidelines, sometimes recommendations change over time as research supports it. (For example, mammograms used to be recommended annually. Even though Medicare will still pay for an annual mammogram, the newer guidelines recommend a mammogram every two years for women of average risk.) Of course, you and your provider may decide to screen more often for some diseases, based on your risk and co-morbidities (chronic disease you are already diagnosed with). Preventive services for you include: - Medicare offers their members a free annual wellness visit, which is time for you and your primary care provider to discuss and plan for your preventive service needs. Take advantage of this benefit every year! 
 
-All people over age 72 should receive the recommended pneumonia vaccines. Current USPSTF guidelines recommend a series of two vaccines for the best pneumonia protection.  
 
-All adults should have a yearly flu vaccine and a tetanus vaccine every 10 years. All adults age 61 years should receive a shingles vaccine once in their lifetime. -A bone mass density test is recommended when a woman turns 65 to screen for osteoporosis. This test is only recommended once as a screening. Some providers will use this same test as a disease monitoring tool if you already have osteoporosis. -All adults age 38-68 years who are overweight should have a diabetes screening test once every three years.  
 
-Other screening tests & preventive services for persons with diabetes include: an eye exam to screen for diabetic retinopathy, a kidney function test, a foot exam, and stricter control over your cholesterol.  
 
-Cardiovascular screening for adults with routine risk involves an electrocardiogram (ECG) at intervals determined by the provider.  
 
-Colorectal cancer screenings should be done for adults age 54-65 years with normal risk. There are a number of acceptable methods of screening for this type of cancer. Each test has its own benefits and drawbacks. Discuss with your provider what is most appropriate for you during your annual wellness visit. The different tests include: colonoscopy (considered the best screening method), a fecal occult blood test, a fecal DNA test, and sigmoidoscopy. -Breast cancer screenings are recommended every other year for women of normal risk age 54-69 years.  
 
-Cervical cancer screenings for women over age 72 are only recommended with certain risk factors.  
 
-All adults born between [facility] should be screened once for Hepatitis C. Here is a list of your current Health Maintenance items (your personalized list of preventive services) with a due date: 
Health Maintenance Due Topic Date Due  
 DTaP/Tdap/Td  (1 - Tdap) 03/28/1961  Shingles Vaccine  01/28/2000  Breast Cancer Screening  08/25/2017 [address] [place] [address] Annual Well Visit  03/14/2018 Introducing [facility] [place] [facility]! Dear Vanessa Ferris: 
Thank you for requesting a [facility] account.   Our records indicate that you already have an active [facility] account. You can access your account anytime at [email or web address] Did you know that you can access your hospital and ER discharge instructions at any time in [facility]? You can also review all of your test results from your hospital stay or ER visit. Additional Information If you have questions, please visit the Frequently Asked Questions section of the [facility] website at [email or web address] Remember, [facility] is NOT to be used for urgent needs. For medical emergencies, dial 911. Now available from your iPhone and Android! Please provide this summary of care documentation to your next provider. Your primary care clinician is listed as Areli KEBEDE. If you have any questions after today's visit, please call [phone].

## 2018-07-09 NOTE — PROGRESS NOTES
HISTORY OF PRESENT ILLNESS  Ang Marks is a 66 y.o. female. HPI     F/u HTN Prediabetes ostopenia  and medicare wellness----------------  Sister passed away this summer from met lung cancer    Has left leg sciatica--treated with injections and neurontin  Hx osteopenia with low FRAX    Last OV:  Has left ear infection--saw ENT--on amoxil and fungal crm now  Had lumbar ESTEVAN in January and April --imporved overall  Less left leg pain and back pain  Able to walk for exercise around her yard  Still works full time-- [employer]   No cp or sob sxs           Patient Active Problem List    Diagnosis Date Noted    Prediabetes 08/30/2016    BPPV (benign paroxysmal positional vertigo) 03/19/2016    Osteopenia 01/12/2016    DDD (degenerative disc disease), lumbar 02/28/2014    Essential hypertension, benign 02/21/2010    Osteoporosis 02/21/2010    Depression 02/21/2010     Current Outpatient Prescriptions   Medication Sig Dispense Refill    gabapentin (NEURONTIN) 100 mg capsule Take  by mouth three (3) times daily.  gabapentin (NEURONTIN) 300 mg capsule Take 300 mg by mouth three (3) times daily.  clotrimazole-betamethasone (LOTRISONE) topical cream Apply  to affected area two (2) times a day.  lisinopril-hydroCHLOROthiazide (PRINZIDE, ZESTORETIC) 20-12.5 mg per tablet TAKE TWO TABLETS BY MOUTH DAILY 180 Tab 3    amLODIPine (NORVASC) 5 mg tablet Take 1 Tab by mouth daily. 90 Tab 3    timolol (TIMOPTIC) 0.5 % ophthalmic solution       diazepam (VALIUM) 2 mg tablet Take one tablet by mouth every 8 hours as needed for severe dizziness 18 Tab 1    cholecalciferol (VITAMIN D3) 1,000 unit tablet Take 1,000 Units by mouth daily.  MULTIVITAMINS W-MINERALS/LUT (CENTRUM SILVER PO) Take 1 Tab by mouth daily.  aspirin 81 mg Tab Take 40.5 mg by mouth daily.  CALCIUM CARBONATE (CALTRATE 600 PO) Take 1 Tab by mouth two (2) times a day.        No Known Allergies  Social History Substance Use Topics    Smoking status: Never Smoker    Smokeless tobacco: Never Used    Alcohol use No      Lab Results  Component Value Date/Time   Hemoglobin A1c 5.8 (H) 12/28/2017 02:32 PM   Hemoglobin A1c 6.1 (H) 08/30/2016 03:45 PM   Hemoglobin A1c 6.0 03/19/2016 03:51 AM   Glucose 90 12/28/2017 02:32 PM   LDL, calculated 119 (H) 12/28/2017 02:32 PM   Creatinine 0.81 12/28/2017 02:32 PM      Lab Results  Component Value Date/Time   Cholesterol, total 198 12/28/2017 02:32 PM   HDL Cholesterol 54 12/28/2017 02:32 PM   LDL, calculated 119 (H) 12/28/2017 02:32 PM   Triglyceride 126 12/28/2017 02:32 PM   CHOL/HDL Ratio 3.1 03/19/2016 03:51 AM     Lab Results  Component Value Date/Time   GFR est non-AA 70 12/28/2017 02:32 PM   GFR est AA 81 12/28/2017 02:32 PM   Creatinine 0.81 12/28/2017 02:32 PM   BUN 13 12/28/2017 02:32 PM   Sodium 141 12/28/2017 02:32 PM   Potassium 4.0 12/28/2017 02:32 PM   Chloride 100 12/28/2017 02:32 PM   CO2 25 12/28/2017 02:32 PM   Magnesium 2.2 05/08/2015 07:42 AM        ROS    Physical Exam   Constitutional: She appears well-developed and well-nourished. Appears stated age   Cardiovascular: Normal rate, regular rhythm and normal heart sounds. Exam reveals no gallop and no friction rub. No murmur heard. Pulmonary/Chest: Effort normal and breath sounds normal. No respiratory distress. She has no wheezes. Abdominal: Soft. Bowel sounds are normal.   Musculoskeletal: She exhibits no edema. Neurological: She is alert. Psychiatric: She has a normal mood and affect. Nursing note and vitals reviewed. ASSESSMENT and PLAN  Diagnoses and all orders for this visit:    1. Essential hypertension, benign  -     METABOLIC PANEL, BASIC    2. Prediabetes  -     HEMOGLOBIN A1C WITH EAG  -     METABOLIC PANEL, BASIC    3. Osteopenia, unspecified location  -     DEXA BONE DENSITY STUDY AXIAL; Future    4.  Benign paroxysmal positional vertigo, unspecified laterality  -     diazePAM (VALIUM) 2 mg tablet; Take one tablet by mouth every 8 hours as needed for severe dizziness    5. Menopause  -     DEXA BONE DENSITY STUDY AXIAL; Future    6. Medicare annual wellness visit, subsequent      Follow-up Disposition:  Return in about 6 months (around 1/9/2019) for htn prediabetes. This is the Subsequent Medicare Annual Wellness Exam, performed 12 months or more after the Initial AWV or the last Subsequent AWV    I have reviewed the patient's medical history in detail and updated the computerized patient record. History     Past Medical History:   Diagnosis Date    Hypertension     Vertigo       Past Surgical History:   Procedure Laterality Date    Mirza Cyndi  11/24/2015         COLORECTAL SCRN; HI RISK IND  11/24/2015         HX HYSTERECTOMY      HX OTHER SURGICAL      Fatty tumor removed L shoulder 2008    UT COLSC FLX W/RMVL OF TUMOR POLYP LESION SNARE TQ  6/8/2010          Current Outpatient Prescriptions   Medication Sig Dispense Refill    diazePAM (VALIUM) 2 mg tablet Take one tablet by mouth every 8 hours as needed for severe dizziness 18 Tab 1    gabapentin (NEURONTIN) 100 mg capsule Take  by mouth three (3) times daily.  gabapentin (NEURONTIN) 300 mg capsule Take 300 mg by mouth three (3) times daily.  clotrimazole-betamethasone (LOTRISONE) topical cream Apply  to affected area two (2) times a day.  lisinopril-hydroCHLOROthiazide (PRINZIDE, ZESTORETIC) 20-12.5 mg per tablet TAKE TWO TABLETS BY MOUTH DAILY 180 Tab 3    amLODIPine (NORVASC) 5 mg tablet Take 1 Tab by mouth daily. 90 Tab 3    timolol (TIMOPTIC) 0.5 % ophthalmic solution       cholecalciferol (VITAMIN D3) 1,000 unit tablet Take 1,000 Units by mouth daily.  MULTIVITAMINS W-MINERALS/LUT (CENTRUM SILVER PO) Take 1 Tab by mouth daily.  aspirin 81 mg Tab Take 40.5 mg by mouth daily.  CALCIUM CARBONATE (CALTRATE 600 PO) Take 1 Tab by mouth two (2) times a day.        No Known Allergies  Family History   Problem Relation Age of Onset    Diabetes Mother     Heart Disease Mother     Hypertension Mother     Hypertension Father     Arthritis-rheumatoid Sister     Cancer Sister      stomach    Hypertension Sister     Arthritis-rheumatoid Brother     Hypertension Brother     Stroke Sister     Kidney Disease Brother     Cancer Daughter      colon     Social History   Substance Use Topics    Smoking status: Never Smoker    Smokeless tobacco: Never Used    Alcohol use No     Patient Active Problem List   Diagnosis Code    Essential hypertension, benign I10    Osteoporosis M81.0    Depression F32.9    DDD (degenerative disc disease), lumbar M51.36    Osteopenia M85.80    BPPV (benign paroxysmal positional vertigo) H81.10    Prediabetes R73.03       Depression Risk Factor Screening:     PHQ over the last two weeks 3/7/2017   PHQ Not Done -   Little interest or pleasure in doing things Not at all   Feeling down, depressed or hopeless Not at all   Total Score PHQ 2 0     Alcohol Risk Factor Screening: You do not drink alcohol or very rarely. Functional Ability and Level of Safety:   Hearing Loss  Hearing is good. Activities of Daily Living  The home contains: no safety equipment. Patient does total self care    Fall Risk  Fall Risk Assessment, last 12 mths 7/9/2018   Able to walk? Yes   Fall in past 12 months? No   Fall with injury?  No   Number of falls in past 12 months -   Fall Risk Score -       Abuse Screen  Patient is not abused    Cognitive Screening   Evaluation of Cognitive Function:  Has your family/caregiver stated any concerns about your memory: no  Normal    Patient Care Team   Patient Care Team:  Emile Bro MD as PCP - Ruby Sanford RN as Nurse Ki Rodriguez MD (Gastroenterology)  Ирина Longo MD (Physical Medicine and Rehab)  Darinel Montoya MD (Orthopedic Surgery)  Chance Collier MD (Ophthalmology)   Justyn Billingsley (Optometry)  Christiano Dillard, RN as Ambulatory Care Navigator    Assessment/Plan   Education and counseling provided:  Are appropriate based on today's review and evaluation  End-of-Life planning (with patient's consent)-see acp note  tdap and shigrix recommended    Diagnoses and all orders for this visit:    1. Essential hypertension, benign  -     METABOLIC PANEL, BASIC   Reasonable control, continue medications and low sodium diet    2. Prediabetes  -     HEMOGLOBIN A1C WITH EAG  -     METABOLIC PANEL, BASIC    3. Osteopenia, unspecified location  -     DEXA BONE DENSITY STUDY AXIAL; Future    4. Benign paroxysmal positional vertigo, unspecified laterality  -     diazePAM (VALIUM) 2 mg tablet; Take one tablet by mouth every 8 hours as needed for severe dizziness or nausea    5. Menopause  -     DEXA BONE DENSITY STUDY AXIAL; Future    6.  Left sciatica   F/u Dr Dilcia Muniz Maintenance Due   Topic Date Due    DTaP/Tdap/Td series (1 - Tdap) 03/28/1961    ZOSTER VACCINE AGE 60>  01/28/2000    BREAST CANCER SCRN MAMMOGRAM  08/25/2017    MEDICARE YEARLY EXAM  03/14/2018

## 2018-07-09 NOTE — PROGRESS NOTES
Chief Complaint   Patient presents with   Rosalba Quiroz Annual Wellness Visit     yearly    Hypertension     6 month follow up    Blood sugar problem     6 month follow up

## 2018-07-09 NOTE — PATIENT INSTRUCTIONS
Medicare Wellness Visit, Female    The best way to live healthy is to have a lifestyle where you eat a well-balanced diet, exercise regularly, limit alcohol use, and quit all forms of tobacco/nicotine, if applicable. Regular preventive services are another way to keep healthy. Preventive services (vaccines, screening tests, monitoring & exams) can help personalize your care plan, which helps you manage your own care. Screening tests can find health problems at the earliest stages, when they are easiest to treat. [facility] follows the current, evidence-based guidelines published by the [facility] ([facility]) when recommending preventive services for our patients. Because we follow these guidelines, sometimes recommendations change over time as research supports it. (For example, mammograms used to be recommended annually. Even though Medicare will still pay for an annual mammogram, the newer guidelines recommend a mammogram every two years for women of average risk.)    Of course, you and your provider may decide to screen more often for some diseases, based on your risk and co-morbidities (chronic disease you are already diagnosed with). Preventive services for you include:    - Medicare offers their members a free annual wellness visit, which is time for you and your primary care provider to discuss and plan for your preventive service needs. Take advantage of this benefit every year!    -All people over age 72 should receive the recommended pneumonia vaccines. Current USPSTF guidelines recommend a series of two vaccines for the best pneumonia protection.     -All adults should have a yearly flu vaccine and a tetanus vaccine every 10 years. All adults age 61 years should receive a shingles vaccine once in their lifetime.      -A bone mass density test is recommended when a woman turns 65 to screen for osteoporosis.  This test is only recommended once as a screening. Some providers will use this same test as a disease monitoring tool if you already have osteoporosis. -All adults age 38-68 years who are overweight should have a diabetes screening test once every three years.     -Other screening tests & preventive services for persons with diabetes include: an eye exam to screen for diabetic retinopathy, a kidney function test, a foot exam, and stricter control over your cholesterol.     -Cardiovascular screening for adults with routine risk involves an electrocardiogram (ECG) at intervals determined by the provider.     -Colorectal cancer screenings should be done for adults age 54-65 years with normal risk. There are a number of acceptable methods of screening for this type of cancer. Each test has its own benefits and drawbacks. Discuss with your provider what is most appropriate for you during your annual wellness visit. The different tests include: colonoscopy (considered the best screening method), a fecal occult blood test, a fecal DNA test, and sigmoidoscopy. -Breast cancer screenings are recommended every other year for women of normal risk age 54-69 years.     -Cervical cancer screenings for women over age 72 are only recommended with certain risk factors.     -All adults born between [facility] should be screened once for Hepatitis C.      Here is a list of your current Health Maintenance items (your personalized list of preventive services) with a due date:  Health Maintenance Due   Topic Date Due    DTaP/Tdap/Td  (1 - Tdap) 03/28/1961    Shingles Vaccine  01/28/2000    Breast Cancer Screening  08/25/2017    Annual Well Visit  03/14/2018

## 2018-07-10 ENCOUNTER — DOCUMENTATION ONLY (OUTPATIENT)
Dept: INTERNAL MEDICINE CLINIC | Age: 78
End: 2018-07-10

## 2018-07-10 LAB
BUN SERPL-MCNC: 18 MG/DL (ref 8–27)
BUN/CREAT SERPL: 21 (ref 12–28)
CALCIUM SERPL-MCNC: 9.4 MG/DL (ref 8.7–10.3)
CHLORIDE SERPL-SCNC: 105 MMOL/L (ref 96–106)
CO2 SERPL-SCNC: 24 MMOL/L (ref 20–29)
CREAT SERPL-MCNC: 0.86 MG/DL (ref 0.57–1)
EST. AVERAGE GLUCOSE BLD GHB EST-MCNC: 120 MG/DL
GLUCOSE SERPL-MCNC: 86 MG/DL (ref 65–99)
HBA1C MFR BLD: 5.8 % (ref 4.8–5.6)
POTASSIUM SERPL-SCNC: 3.8 MMOL/L (ref 3.5–5.2)
SODIUM SERPL-SCNC: 145 MMOL/L (ref 134–144)

## 2018-07-23 ENCOUNTER — HOSPITAL ENCOUNTER (OUTPATIENT)
Dept: MAMMOGRAPHY | Age: 78
Discharge: HOME | End: 2018-07-23
Attending: INTERNAL MEDICINE
Payer: MEDICARE

## 2018-07-23 DIAGNOSIS — M85.80 OSTEOPENIA, UNSPECIFIED LOCATION: ICD-10-CM

## 2018-07-23 DIAGNOSIS — Z78.0 MENOPAUSE: ICD-10-CM

## 2018-07-23 PROCEDURE — 77080 DXA BONE DENSITY AXIAL: CPT

## 2018-12-04 RX ORDER — AMLODIPINE BESYLATE 5 MG/1
TABLET ORAL
Qty: 90 TAB | Refills: 2 | Status: SHIPPED | OUTPATIENT
Start: 2018-12-04 | End: 2020-02-24 | Stop reason: SDUPTHER

## 2018-12-04 RX ORDER — LISINOPRIL AND HYDROCHLOROTHIAZIDE 12.5; 2 MG/1; MG/1
TABLET ORAL
Qty: 180 TAB | Refills: 2 | Status: SHIPPED | OUTPATIENT
Start: 2018-12-04 | End: 2019-08-22 | Stop reason: SDUPTHER

## 2019-02-05 ENCOUNTER — HOSPITAL ENCOUNTER (OUTPATIENT)
Age: 79
Setting detail: OUTPATIENT SURGERY
Discharge: HOME | End: 2019-02-05
Attending: INTERNAL MEDICINE | Admitting: INTERNAL MEDICINE
Payer: MEDICARE

## 2019-02-05 ENCOUNTER — ANESTHESIA EVENT (OUTPATIENT)
Dept: ENDOSCOPY | Age: 79
End: 2019-02-05
Payer: MEDICARE

## 2019-02-05 ENCOUNTER — ANESTHESIA (OUTPATIENT)
Dept: ENDOSCOPY | Age: 79
End: 2019-02-05
Payer: MEDICARE

## 2019-02-05 VITALS
RESPIRATION RATE: 14 BRPM | WEIGHT: 159.25 LBS | HEIGHT: 62 IN | BODY MASS INDEX: 29.3 KG/M2 | SYSTOLIC BLOOD PRESSURE: 134 MMHG | OXYGEN SATURATION: 100 % | TEMPERATURE: 98.8 F | HEART RATE: 73 BPM | DIASTOLIC BLOOD PRESSURE: 83 MMHG

## 2019-02-05 PROCEDURE — 76040000019: Performed by: INTERNAL MEDICINE

## 2019-02-05 PROCEDURE — 74011250636 HC RX REV CODE- 250/636: Performed by: INTERNAL MEDICINE

## 2019-02-05 PROCEDURE — 74011250636 HC RX REV CODE- 250/636

## 2019-02-05 PROCEDURE — 76060000031 HC ANESTHESIA FIRST 0.5 HR: Performed by: INTERNAL MEDICINE

## 2019-02-05 RX ORDER — EPINEPHRINE 0.1 MG/ML
1 INJECTION INTRACARDIAC; INTRAVENOUS
Status: DISCONTINUED | OUTPATIENT
Start: 2019-02-05 | End: 2019-02-05 | Stop reason: HOSPADM

## 2019-02-05 RX ORDER — DEXTROMETHORPHAN/PSEUDOEPHED 2.5-7.5/.8
1.2 DROPS ORAL
Status: DISCONTINUED | OUTPATIENT
Start: 2019-02-05 | End: 2019-02-05 | Stop reason: HOSPADM

## 2019-02-05 RX ORDER — PROPOFOL 10 MG/ML
INJECTION, EMULSION INTRAVENOUS AS NEEDED
Status: DISCONTINUED | OUTPATIENT
Start: 2019-02-05 | End: 2019-02-05 | Stop reason: HOSPADM

## 2019-02-05 RX ORDER — MIDAZOLAM HYDROCHLORIDE 1 MG/ML
1-2 INJECTION, SOLUTION INTRAMUSCULAR; INTRAVENOUS
Status: DISCONTINUED | OUTPATIENT
Start: 2019-02-05 | End: 2019-02-05 | Stop reason: HOSPADM

## 2019-02-05 RX ORDER — FLUMAZENIL 0.1 MG/ML
0.2 INJECTION INTRAVENOUS
Status: DISCONTINUED | OUTPATIENT
Start: 2019-02-05 | End: 2019-02-05 | Stop reason: HOSPADM

## 2019-02-05 RX ORDER — SODIUM CHLORIDE 0.9 % (FLUSH) 0.9 %
5-40 SYRINGE (ML) INJECTION EVERY 8 HOURS
Status: DISCONTINUED | OUTPATIENT
Start: 2019-02-05 | End: 2019-02-05 | Stop reason: HOSPADM

## 2019-02-05 RX ORDER — NALOXONE HYDROCHLORIDE 0.4 MG/ML
0.4 INJECTION, SOLUTION INTRAMUSCULAR; INTRAVENOUS; SUBCUTANEOUS
Status: DISCONTINUED | OUTPATIENT
Start: 2019-02-05 | End: 2019-02-05 | Stop reason: HOSPADM

## 2019-02-05 RX ORDER — ATROPINE SULFATE 0.1 MG/ML
0.5 INJECTION INTRAVENOUS
Status: DISCONTINUED | OUTPATIENT
Start: 2019-02-05 | End: 2019-02-05 | Stop reason: HOSPADM

## 2019-02-05 RX ORDER — LIDOCAINE HYDROCHLORIDE 20 MG/ML
INJECTION, SOLUTION EPIDURAL; INFILTRATION; INTRACAUDAL; PERINEURAL AS NEEDED
Status: DISCONTINUED | OUTPATIENT
Start: 2019-02-05 | End: 2019-02-05 | Stop reason: HOSPADM

## 2019-02-05 RX ORDER — SODIUM CHLORIDE 9 MG/ML
100 INJECTION, SOLUTION INTRAVENOUS CONTINUOUS
Status: DISCONTINUED | OUTPATIENT
Start: 2019-02-05 | End: 2019-02-05 | Stop reason: HOSPADM

## 2019-02-05 RX ORDER — SODIUM CHLORIDE 0.9 % (FLUSH) 0.9 %
5-40 SYRINGE (ML) INJECTION AS NEEDED
Status: DISCONTINUED | OUTPATIENT
Start: 2019-02-05 | End: 2019-02-05 | Stop reason: HOSPADM

## 2019-02-05 RX ORDER — FENTANYL CITRATE 50 UG/ML
25 INJECTION, SOLUTION INTRAMUSCULAR; INTRAVENOUS
Status: DISCONTINUED | OUTPATIENT
Start: 2019-02-05 | End: 2019-02-05 | Stop reason: HOSPADM

## 2019-02-05 RX ADMIN — PROPOFOL 180 MG: 10 INJECTION, EMULSION INTRAVENOUS at 13:30

## 2019-02-05 RX ADMIN — SODIUM CHLORIDE 100 ML/HR: 900 INJECTION, SOLUTION INTRAVENOUS at 13:10

## 2019-02-05 RX ADMIN — LIDOCAINE HYDROCHLORIDE 40 MG: 20 INJECTION, SOLUTION EPIDURAL; INFILTRATION; INTRACAUDAL; PERINEURAL at 13:16

## 2019-02-05 NOTE — DISCHARGE INSTRUCTIONS
Jany Lopez MD  [facility], [address] [facility]  [place], [address] [facility], [place]  [phone]  [email or web address]    Matty Bell  [phone]  1940    COLON DISCHARGE INSTRUCTIONS  Discomfort:  Redness at IV site- apply warm compress to area; if redness or soreness persist- contact your physician  There may be a slight amount of blood passed from the rectum  Gaseous discomfort- walking, belching will help relieve any discomfort  You may not operate a vehicle for 12 hours  You may not engage in an occupation involving machinery or appliances for rest of today  You may not drink alcoholic beverages for at least 12 hours  Avoid making any critical decisions for at least 24 hour  DIET:   High fiber diet. - however -  remember your colon is empty and a heavy meal will produce gas. Avoid these foods:  vegetables, fried / greasy foods, carbonated drinks for today      ACTIVITY:  You may resume your normal daily activities it is recommended that you spend the remainder of the day resting -  avoid any strenuous activity. CALL M.D. ANY SIGN OF:   Increasing pain, nausea, vomiting  Abdominal distension (swelling)  New increased bleeding (oral or rectal)  Fever (chills)  Pain in chest area  Bloody discharge from nose or mouth  Shortness of breath     COLONOSCOPY FINDINGS:  Your colonoscopy showed: internal hemorrhoids and diverticulosis. Follow-up Instructions:   Call Dr. Jany Lopez if any questions or problems. Telephone # [phone]    Should have a repeat colonoscopy in 5 years.

## 2019-02-05 NOTE — ANESTHESIA PREPROCEDURE EVALUATION
Anesthetic History No history of anesthetic complications Review of Systems / Medical History Patient summary reviewed, nursing notes reviewed and pertinent labs reviewed Pulmonary Within defined limits Neuro/Psych Psychiatric history Comments: Vertigo Depression Cardiovascular Within defined limits Hypertension: well controlled Exercise tolerance: >4 METS 
  
GI/Hepatic/Renal 
Within defined limits Endo/Other Obesity and arthritis Other Findings Comments: Prediabetes Physical Exam 
 
Airway Mallampati: II 
TM Distance: 4 - 6 cm Neck ROM: normal range of motion Mouth opening: Normal 
 
 Cardiovascular Regular rate and rhythm,  S1 and S2 normal,  no murmur, click, rub, or gallop Rhythm: regular Rate: normal 
 
 
 
 Dental 
 
Dentition: Upper dentition intact and Lower dentition intact Pulmonary Breath sounds clear to auscultation Abdominal 
GI exam deferred Other Findings Anesthetic Plan ASA: 2 Anesthesia type: general and total IV anesthesia Induction: Intravenous Anesthetic plan and risks discussed with: Patient

## 2019-02-05 NOTE — PROGRESS NOTES
..[facility] 1940 
[id number] Situation: 
Verbal report received from: MARY Mccann RN Procedure: Procedure(s): 
COLONOSCOPY Background: 
 
Preoperative diagnosis: HISTORY OF POLYPS Postoperative diagnosis: Diverticulosis and hemorrhoids :  Dr. Kendra Martinez Assistant(s): Endoscopy RN-2: Robina Sanchez RN Specimens: * No specimens in log * H. Pylori  no Assessment: 
Intra-procedure medications Intravenous fluids: NS@ [place] Vital signs stable Abdominal assessment: round and soft Recommendation: 
Discharge patient per MD order. Family or Friend Permission to share finding with family or friend yes

## 2019-02-05 NOTE — H&P
Seferino Gold MD 
[facility], [address] [place] [address], [address] [place], [address] [facility] 
[phone] 
[email or web address] [facility] History and Physical 
 
Patient: Maxine Verduzco Physician: Mele Madrigal MD 
 
Vital Signs: Blood pressure 154/86, pulse 75, temperature 98.8 °F (37.1 °C), resp. rate 12, height 5' 2\" (1.575 m), weight 72.2 kg (159 lb 4 oz), SpO2 100 %. Allergies: No Known Allergies Chief Complaint: Hx of polyps History of Present Illness: Personal history of colonic polyps. Last colonoscopy was 2015 and showed polyps which were removed. Currently has no GI symptoms. No FH of colon cancer or polyps. History: 
Past Medical History:  
Diagnosis Date  Arthritis   
 back  Hypertension  Vertigo Past Surgical History:  
Procedure Laterality Date  COLONOSCOPY,REMV LESN,SNARE  11/24/2015  COLORECTAL SCRN; HI RISK IND  11/24/2015  HX HYSTERECTOMY  HX OTHER SURGICAL Fatty tumor removed L shoulder 2008  OR COLSC FLX W/RMVL OF TUMOR POLYP LESION SNARE TQ  6/8/2010 Social History Socioeconomic History  Marital status:  Spouse name: Not on file  Number of children: Not on file  Years of education: Not on file  Highest education level: Not on file Occupational History  Occupation:  in [employer] Tobacco Use  Smoking status: Never Smoker  Smokeless tobacco: Never Used Substance and Sexual Activity  Alcohol use: No  
 Drug use: No  
 Sexual activity: No  
  
Family History Problem Relation Age of Onset  Diabetes Mother  Heart Disease Mother  Hypertension Mother  Hypertension Father Taj.Hamman Arthritis-rheumatoid Sister  Cancer Sister   
     stomach  Hypertension Sister  Arthritis-rheumatoid Brother  Hypertension Brother  Stroke Sister  Kidney Disease Brother  Cancer Daughter   
     colon Patient Active Problem List  
Diagnosis Code  Essential hypertension, benign I10  
 Osteoporosis M81.0  Depression F32.9  DDD (degenerative disc disease), lumbar M51.36  
 Osteopenia M85.80  BPPV (benign paroxysmal positional vertigo) H81.10  Prediabetes R73.03 Medications:  
Prior to Admission medications Medication Sig Start Date End Date Taking? Authorizing Provider  
lisinopril-hydroCHLOROthiazide (PRINZIDE, ZESTORETIC) 20-12.5 mg per tablet TAKE TWO TABLETS BY MOUTH DAILY 12/4/18  Yes Mark Vazquez MD  
amLODIPine (NORVASC) 5 mg tablet TAKE ONE TABLET BY MOUTH DAILY 12/4/18  Yes Mark Vazquez MD  
gabapentin (NEURONTIN) 100 mg capsule Take 100 mg by mouth three (3) times daily as needed. Yes Provider, Historical  
gabapentin (NEURONTIN) 300 mg capsule Take 300 mg by mouth three (3) times daily as needed. Yes Provider, Historical  
clotrimazole-betamethasone (LOTRISONE) topical cream Apply  to affected area two (2) times a day. Yes Provider, Historical  
timolol (TIMOPTIC) 0.5 % ophthalmic solution Administer 1 Drop to both eyes daily. 8/16/16  Yes Provider, Historical  
cholecalciferol (VITAMIN D3) 1,000 unit tablet Take 1,000 Units by mouth daily. Yes Provider, Historical  
MULTIVITAMINS W-MINERALS/LUT (CENTRUM SILVER PO) Take 1 Tab by mouth daily. Yes Provider, Historical  
aspirin 81 mg Tab Take 40.5 mg by mouth daily. Yes Provider, Historical  
CALCIUM CARBONATE (CALTRATE 600 PO) Take 1 Tab by mouth two (2) times a day. Yes Provider, Historical  
diazePAM (VALIUM) 2 mg tablet Take one tablet by mouth every 8 hours as needed for severe dizziness 7/9/18   Mark Vazquez MD  
 
 
Physical Exam:  
 
General: well developed, well nourished HEENT: unremarkable Heart: regular rhythm no mumur Lungs: clear Abdominal:  benign Neurological: unremarkable Extremities: no edema Findings/Diagnosis: Personal history of colonic polyps Plan of Care/Planned Procedure: Colonoscopy with monitored anesthesia care sedation Signed: 
Isabel Kohler MD 2/5/2019

## 2019-02-05 NOTE — PROCEDURES
[facility]                  Colonoscopy Operative Report    2/5/2019      Khadijah Persaud  [id number]  1940    Procedure Type:   Colonoscopy --screening     Indications:    Personal history of colon polyps (screening only)     Pre-operative Diagnosis: see indication above    Post-operative Diagnosis:  See findings below    :  Tawanna Cuello MD    Referring Provider: Dorothea Hinojosa MD      Sedation:  MAC anesthesia Propofol    Pre-Procedural Exam:      Airway: clear,  No airway problems anticipated  Heart: RRR, without gallops or rubs  Lungs: clear bilaterally without wheezes, crackles, or rhonchi  Abdomen: soft, nontender, nondistended, bowel sounds present  Mental Status: awake, alert and oriented to person, place and time     Procedure Details:  After informed consent was obtained with all risks and benefits of procedure explained and preoperative exam completed, the patient was taken to the endoscopy suite and placed in the left lateral decubitus position. Upon sequential sedation as per above, a digital rectal exam was performed . The Olympus videocolonoscope  was inserted in the rectum and carefully advanced to the cecum, which was identified by the ileocecal valve and appendiceal orifice. The cecum was identified by the ileocecal valve and appendiceal orifice. The quality of preparation was good. The colonoscope was slowly withdrawn with careful evaluation between folds. Retroflexion in the rectum was completed demonstrating internal hemorrhoids. Findings:   Rectum: Grade 1 internal hemorrhoid(s); Sigmoid:     - Diverticulosis  Descending Colon: normal  Transverse Colon: normal  Ascending Colon: normal  Cecum: normal  Terminal Ileum: not intubated      Specimen Removed:  none    Complications: None. EBL:  None.     Impression:    normal colonic mucosa throughout  diverticulosis,  Mild in degree, involving the sigmoid  hemorrhoids internal, Moderate in size    Recommendations: --Repeat colonoscopy in 5 years. High fiber diet. Resume normal medication(s). Discharge Disposition:  Home in the company of a  when able to ambulate. Maria Del Rosario Starkey MD    2/5/2019     JOVANNY Hay MD  [facility], [address]  [address]  [phone]  [email or web address]

## 2019-02-05 NOTE — ANESTHESIA POSTPROCEDURE EVALUATION
Procedure(s): 
COLONOSCOPY. Anesthesia Post Evaluation Patient location during evaluation: PACU Note status: Adequate. Level of consciousness: responsive to verbal stimuli and sleepy but conscious Pain management: satisfactory to patient Airway patency: patent Anesthetic complications: no 
Cardiovascular status: acceptable Respiratory status: acceptable Hydration status: acceptable Comments: +Post-Anesthesia Evaluation and Assessment Patient: Nirmal Sahu MRN: [id number]  SSN: [id number] YOB: 1940  Age: 66 y.o. Sex: female Cardiovascular Function/Vital Signs /83   Pulse 73   Temp 37.1 °C (98.8 °F)   Resp 14   Ht 5' 2\" (1.575 m)   Wt 72.2 kg (159 lb 4 oz)   SpO2 100%   BMI 29.13 kg/m² Patient is status post Procedure(s): 
COLONOSCOPY. Nausea/Vomiting: Controlled. Postoperative hydration reviewed and adequate. Pain: 
Pain Scale 1: Numeric (0 - 10) (02/05/19 1342) Pain Intensity 1: 0 (02/05/19 1342) Managed. Neurological Status: At baseline. Mental Status and Level of Consciousness: Arousable. Pulmonary Status:  
O2 Device: Room air (02/05/19 1351) Adequate oxygenation and airway patent. Complications related to anesthesia: None Post-anesthesia assessment completed. No concerns. Signed By: Shravan Batista DO  
 2/5/2019 Post anesthesia nausea and vomiting:  controlled Visit Vitals /83 Pulse 73 Temp 37.1 °C (98.8 °F) Resp 14 Ht 5' 2\" (1.575 m) Wt 72.2 kg (159 lb 4 oz) SpO2 100% BMI 29.13 kg/m²

## 2019-02-05 NOTE — PERIOP NOTES
Anesthesia reports 180mg Propofol, 40mg Lidocaine and 400mL NS given during procedure. Received report from anesthesia staff on vital signs and status of patient. Endoscope was pre-cleaned at the bedside immediately following procedure by Davonte JORDAN

## 2019-03-04 NOTE — PERIOP NOTES
[facility] Ambulatory Surgery Unit Pre-operative Instructions Procedure Date  3/5/19            Tentative Arrival Time 1300 1. On the day of your procedure, please report to the Ambulatory Surgery Unit Registration Desk and sign in at your designated time. The Ambulatory Surgery Unit is located in [facility] ER on the [facility] side of the \[facility]\"" across from the [address] [place] [address]. Please have all of your health insurance cards and a photo ID. 2. You must have someone with you to drive you home as directed by your surgeon. 3. You may have a light breakfast and take normal morning medications. 4. We recommend you do not drink any alcoholic beverages for 24 hours before and after your procedure. 5. Contact your surgeons office for instructions on the following medications: non-steroidal anti-inflammatory drugs (i.e. Advil, Aleve), vitamins, and supplements. (Some surgeons will want you to stop these medications prior to surgery and others may allow you to take them) **If you are currently taking Plavix, Coumadin, Aspirin and/or other blood-thinning agents, contact your surgeon for instructions. ** Your surgeon will partner with the physician prescribing these medications to determine if it is safe to stop or if you need to continue taking. Please do not stop taking these medications without instructions from your surgeon. 6. In an effort to help prevent surgical site infection, we ask that you shower with an anti-bacterial soap (i.e. Dial or Safeguard) on the morning of your procedure. Do not apply any lotions, powders, or deodorants after showering. 7. Wear comfortable clothes. Wear glasses instead of contacts. Do not bring any jewelry or money (other than copays or fees as instructed). Do not wear make-up, particularly mascara, the morning of your procedure. Wear your hair loose or down, no ponytails, buns, yuni pins or clips.  All body piercings must be removed. 8. You should understand that if you do not follow these instructions your procedure may be cancelled. If your physical condition changes (i.e. fever, cold or flu) please contact your surgeon as soon as possible. 9. It is important that you be on time. If a situation occurs where you may be late, or if you have any questions or problems, please call [phone]. 
 
10. Your procedure time may be subject to change. You will receive a phone call the day prior to confirm your arrival time. I understand a pre-operative phone call will be made to verify my procedure time. In the event that I am not available, I give permission for a message to be left on my answering service and/or with another person? yes Pre-op instructions given to pt over the phone and she verbalized an understanding 
 
 ___________________      ___________________      ___________________ 
(Signature of Patient)          (Witness)                   (Date and Time)

## 2019-03-05 ENCOUNTER — APPOINTMENT (OUTPATIENT)
Dept: GENERAL RADIOLOGY | Age: 79
End: 2019-03-05
Attending: PHYSICAL MEDICINE & REHABILITATION
Payer: MEDICARE

## 2019-03-05 ENCOUNTER — HOSPITAL ENCOUNTER (OUTPATIENT)
Age: 79
Setting detail: OUTPATIENT SURGERY
Discharge: HOME | End: 2019-03-05
Attending: PHYSICAL MEDICINE & REHABILITATION | Admitting: PHYSICAL MEDICINE & REHABILITATION
Payer: MEDICARE

## 2019-03-05 VITALS
TEMPERATURE: 98 F | OXYGEN SATURATION: 97 % | RESPIRATION RATE: 16 BRPM | DIASTOLIC BLOOD PRESSURE: 101 MMHG | BODY MASS INDEX: 29.35 KG/M2 | HEIGHT: 62 IN | SYSTOLIC BLOOD PRESSURE: 168 MMHG | WEIGHT: 159.5 LBS | HEART RATE: 70 BPM

## 2019-03-05 PROCEDURE — 76000 FLUOROSCOPY <1 HR PHYS/QHP: CPT

## 2019-03-05 PROCEDURE — 74011250636 HC RX REV CODE- 250/636: Performed by: PHYSICAL MEDICINE & REHABILITATION

## 2019-03-05 PROCEDURE — 74011000250 HC RX REV CODE- 250: Performed by: PHYSICAL MEDICINE & REHABILITATION

## 2019-03-05 PROCEDURE — 76030000002 HC AMB SURG OR TIME FIRST 0.: Performed by: PHYSICAL MEDICINE & REHABILITATION

## 2019-03-05 PROCEDURE — 76210000046 HC AMBSU PH II REC FIRST 0.5 HR: Performed by: PHYSICAL MEDICINE & REHABILITATION

## 2019-03-05 PROCEDURE — 74011636320 HC RX REV CODE- 636/320: Performed by: PHYSICAL MEDICINE & REHABILITATION

## 2019-03-05 PROCEDURE — 77030003666 HC NDL SPINAL BD -A: Performed by: PHYSICAL MEDICINE & REHABILITATION

## 2019-03-05 RX ORDER — METHYLPREDNISOLONE ACETATE 40 MG/ML
80 INJECTION, SUSPENSION INTRA-ARTICULAR; INTRALESIONAL; INTRAMUSCULAR; SOFT TISSUE ONCE
Status: COMPLETED | OUTPATIENT
Start: 2019-03-05 | End: 2019-03-05

## 2019-03-05 RX ORDER — BUPIVACAINE HYDROCHLORIDE 5 MG/ML
10 INJECTION, SOLUTION EPIDURAL; INTRACAUDAL ONCE
Status: DISCONTINUED | OUTPATIENT
Start: 2019-03-05 | End: 2019-03-05 | Stop reason: HOSPADM

## 2019-03-05 RX ORDER — LIDOCAINE HYDROCHLORIDE 20 MG/ML
5 INJECTION, SOLUTION EPIDURAL; INFILTRATION; INTRACAUDAL; PERINEURAL ONCE
Status: COMPLETED | OUTPATIENT
Start: 2019-03-05 | End: 2019-03-05

## 2019-03-05 NOTE — PERIOP NOTES
1440: DC instructions reviewed with pt, who verbalized understanding with no further questions. Equal/strong dorsiflexion/extension, denies pain at this time. Left lower back open to air, no drainage or bleeding noted at this time. 1446: pt transported via [facility] by nurse to [facility] to be DC to home with family.

## 2019-03-05 NOTE — DISCHARGE INSTRUCTIONS
Dr. Eve Horne Discharge Instructions  Transforaminal Epidural Steroid Injection/ Selective Nerve Block    You had a transforaminal epidural steroid injection/ selective nerve block today. You will probably have some numbness, and possibly weakness, in your leg for the next 6 to 8 hours. The steroids will slowly become effective, reducing your pain, over the next 2 weeks. You should begin feeling better after a few days, but it may take up to 2 weeks to notice the difference. The benefit you get from your injection will last a variable amount of time, depending on the severity of your lumbar spine problem.  Pain: Most people do not have any increase in pain after this injection. However, you might experience some soreness in your low back. If this happens, putting an ice pack over the sore area will help.  Bandage: You will have a small bandage covering the site of the injection. You may remove it once you get home.  Restrictions: Someone should drive you home after the injection. After that, you have no restrictions. You need to be careful while walking, as you may still have some numbness or weakness in your leg. You may resume your normal level of activity. You may take a shower or bath, and you may eat normally. You should continue your current exercises and/or therapy routine.   Medications: Continue your current medications as prescribed. If your pain decreases, you may reduce the amount of your pain medicines. If you stopped taking anticoagulants or blood-thinners before the injection, start them tomorrow. If you have diabetes, your blood sugar may be elevated for a few days. Call your primary doctor with any questions.   Call Dr. Eve Horne at [phone] if you experience:   Fever (101 degrees Fahrenheit or greater)   Nausea or vomiting   Headache unrelieved by your normal pain medicine   Redness or swelling at the injection site that lasts more than 1 day   New numbness, tingling, weakness, or pain that you didnt have before the injection    Follow-up appointment:   If still having pain in 1-2 weeks, call office at [phone] for a follow up appointment. DISCHARGE SUMMARY from Nurse    The following personal items collected during your admission are returned to you:   Dental Appliance: Dental Appliances: None  Vision:    Hearing Aid:    Jewelry: Jewelry: Bracelet, Ring  Clothing: Clothing: With patient, Jacket/Coat  Other Valuables: Other Valuables: None  Valuables sent to safe: If you were given prescriptions, please review the written information on prescribed medications. · You will receive a Post Operative Call from one of the Recovery Room Nurses on the day after your surgery to check on you. It is very important for us to know how you are recovering after your surgery. · You may receive an e-mail or letter in the mail from Ariana regarding your experience with us in the Ambulatory Surgery Unit. Your feedback is valuable to us and we appreciate your participation in the survey. If you have not had your influenza or pneumococcal vaccines, please follow up with your primary care physician. The discharge information has been reviewed with the patient. The patient verbalized understanding.

## 2019-03-05 NOTE — H&P
Procedural Case Note    3/5/2019    (1:21 PM)    Maxine Verduzco    1940   (66 y.o.)    [id number]    CC:  pain    ROS:   Complete ROS obtained, no CP, no SOB, no N or V    PMH:     Past Medical History:   Diagnosis Date    Arthritis     back    Cataract     Hypertension     Vertigo        ALLERGIES:   No Known Allergies    MEDS:     No current facility-administered medications for this encounter. Visit Vitals  Ht 5' 2\" (1.575 m)   Wt 65.3 kg (144 lb)   BMI 26.34 kg/m²     PE:  Gen: NAD  Head: normocephalic  Heart: RRR  Lungs: CTA erinn  Abd: NT, ND, soft  Neuro: awake and alert  Skin: intact    IMPRESSION:   LS DDD    Note:  The clinical status was discussed in detail with the patient. The procedure was again discussed and described in detail. All understand and accept the planned procedure and risks; reject other forms of treatment. All questions are answered.     Keegan Giordano MD

## 2019-03-05 NOTE — PERIOP NOTES
Permission received to review discharge instructions and discuss private health information with granddaughter Pardeep Pugh

## 2019-03-05 NOTE — PERIOP NOTES
Skin assessment:   WNL   Skin color: wnl for ethnicity    Skin condition: wnl for age   Skin integrity: wnl for age   Turgor: wnl    Neuro:  Push/Pull assessment:     LUE Response: strong    LLE Response: strong push/strong pull   RUE Response: strong    RLE Response: strong push/strong pull    OB/Gyn assessment:    LMP:     If no menstrual period then reason: hysterectomy

## 2019-03-05 NOTE — PERIOP NOTES
Priya Daniela  1940  [id number]    Situation:  Verbal report given from: Isabel Sutton RN  Procedure: Procedure(s):  LEFT L4 & L5 TRANSFORAMINAL EPIDURAL STEROID INJECTION    Background:    Preoperative diagnosis: DEGENERATIVE DISC DISEASE LUMBAR    Postoperative diagnosis: DEGENERATIVE [place] DISEASE LUMBAR    :  Dr. Parisa Jon    Assistant(s): Circ-1: Sandro Irvin RN  Local Nurse Monitor: Abdoul Gore RN  Scrub Tech-1: Zaki Husbands    Specimens: * No specimens in log *    Assessment:  Intra-procedure medications         Anesthesia gave intra-procedure sedation and medications, see anesthesia flow sheet     Intravenous fluids: LR@ KVO     Vital signs stable       Recommendation:    Permission to share finding with Kareem : yes

## 2019-03-05 NOTE — OP NOTES
Epidural Steroid Injection Operative Report    Indications: This is a 66 y.o. female who presents with low back pain. She was positive for LS DDD. The patient was admitted for surgery as conservative measures have failed. Date of Surgery: 3/5/2019    Preoperative Diagnosis: LS DDD    Postoperative Diagnosis: LS DDD    Surgeon(s) and Role:     * Africa Tidwell MD - Primary     Procedure:  Procedure(s):  LEFT L4 & L5 TRANSFORAMINAL EPIDURAL STEROID INJECTION    Procedure in Detail:  After appropriate informed consent was obtained, the patient was taken to the operating suite and placed in the prone position on the operating table on appropriate padding. The LS region was prepped and draped in the usual sterile fashion. Intraoperative fluoroscopy was used to localize the LS spine. The skin was infiltrated with 2% lidocaine. An 22-g needle was advanced into the Left L4 and L5 neuroforamen under fluoroscopic guidance. A small amount of contrast was injected into the epidural space, confirming appropriate needle placement on fluoroscopy. Next, 2ml of 2% lidocaine and 80mg of Depo-Medrol were injected. The needle was removed from the patient. The patient was then turned back into the supine position on the stretcher and was taken to the Recovery Room in stable condition.     Estimated Blood Loss:  none     Specimens: None       Drains: None          Complications:  None    Signed By: Chacha Prince MD                        March 5, 2019

## 2019-08-22 ENCOUNTER — OFFICE VISIT (OUTPATIENT)
Dept: INTERNAL MEDICINE CLINIC | Age: 79
End: 2019-08-22

## 2019-08-22 ENCOUNTER — HOSPITAL ENCOUNTER (OUTPATIENT)
Dept: LAB | Age: 79
Discharge: HOME | End: 2019-08-22
Payer: MEDICARE

## 2019-08-22 VITALS
RESPIRATION RATE: 16 BRPM | TEMPERATURE: 98.5 F | DIASTOLIC BLOOD PRESSURE: 101 MMHG | HEART RATE: 78 BPM | OXYGEN SATURATION: 98 % | SYSTOLIC BLOOD PRESSURE: 165 MMHG | WEIGHT: 162 LBS | HEIGHT: 62 IN | BODY MASS INDEX: 29.81 KG/M2

## 2019-08-22 DIAGNOSIS — I10 ESSENTIAL HYPERTENSION: Primary | ICD-10-CM

## 2019-08-22 DIAGNOSIS — Z00.00 MEDICARE ANNUAL WELLNESS VISIT, SUBSEQUENT: ICD-10-CM

## 2019-08-22 DIAGNOSIS — Z23 ENCOUNTER FOR IMMUNIZATION: ICD-10-CM

## 2019-08-22 DIAGNOSIS — R07.9 CHEST PAIN, UNSPECIFIED TYPE: ICD-10-CM

## 2019-08-22 DIAGNOSIS — R73.03 PREDIABETES: ICD-10-CM

## 2019-08-22 PROCEDURE — 36415 COLL VENOUS BLD VENIPUNCTURE: CPT

## 2019-08-22 PROCEDURE — 85027 COMPLETE CBC AUTOMATED: CPT

## 2019-08-22 PROCEDURE — 80048 BASIC METABOLIC PNL TOTAL CA: CPT

## 2019-08-22 PROCEDURE — 80061 LIPID PANEL: CPT

## 2019-08-22 PROCEDURE — 83036 HEMOGLOBIN GLYCOSYLATED A1C: CPT

## 2019-08-22 RX ORDER — METOPROLOL SUCCINATE 25 MG/1
25 TABLET, EXTENDED RELEASE ORAL DAILY
Qty: 90 TAB | Refills: 1 | Status: SHIPPED | OUTPATIENT
Start: 2019-08-22 | End: 2020-02-16

## 2019-08-22 RX ORDER — LISINOPRIL AND HYDROCHLOROTHIAZIDE 12.5; 2 MG/1; MG/1
TABLET ORAL
Qty: 180 TAB | Refills: 3 | Status: SHIPPED | OUTPATIENT
Start: 2019-08-22 | End: 2020-08-23

## 2019-08-22 NOTE — PROGRESS NOTES
HISTORY OF PRESENT ILLNESS  Russ Rodriguez is a 78 y.o. female. HPI        F/u HTN Prediabetes ostopenia and medicare wellness---------------  Had screening colonoscopy this year--tics, no polyps -Dr Maria Victoria Badillo, repeat 5 yrs d/t hx polyps    C/o swollen droopy right upper eyelid x 1 week  Sees eye MD tomorrow  No vision loss, slurred speech or weakness    C/o chest tightness last few months intermittently at rest maybe once per month lasting about 1 minute  No exertional CP per pt    bp up recently-taking lisinopril/hct and norvasc        Last ov  Sister passed away this summer from met lung cancer     Has left leg sciatica--treated with injections and neurontin  Hx osteopenia with low FRAX     Last OV:  Has left ear infection--saw ENT--on amoxil and fungal crm now  Had lumbar ESTEVAN in January and April --imporved overall  Less left leg pain and back pain  Able to walk for exercise around her yard  Still works full time-- public [employer]   No cp or sob sxs         Patient Active Problem List    Diagnosis Date Noted    Prediabetes 08/30/2016    BPPV (benign paroxysmal positional vertigo) 03/19/2016    Osteopenia 01/12/2016    DDD (degenerative disc disease), lumbar 02/28/2014    Essential hypertension, benign 02/21/2010    Osteoporosis 02/21/2010    Depression 02/21/2010     Current Outpatient Medications   Medication Sig Dispense Refill    lisinopril-hydroCHLOROthiazide (PRINZIDE, ZESTORETIC) 20-12.5 mg per tablet TAKE TWO TABLETS BY MOUTH DAILY 180 Tab 2    amLODIPine (NORVASC) 5 mg tablet TAKE ONE TABLET BY MOUTH DAILY 90 Tab 2    diazePAM (VALIUM) 2 mg tablet Take one tablet by mouth every 8 hours as needed for severe dizziness 18 Tab 1    gabapentin (NEURONTIN) 100 mg capsule Take 100 mg by mouth three (3) times daily as needed.  gabapentin (NEURONTIN) 300 mg capsule Take 300 mg by mouth three (3) times daily as needed.       clotrimazole-betamethasone (LOTRISONE) topical cream Apply  to affected area two (2) times a day.  timolol (TIMOPTIC) 0.5 % ophthalmic solution Administer 1 Drop to both eyes daily.  cholecalciferol (VITAMIN D3) 1,000 unit tablet Take 1,000 Units by mouth daily.  MULTIVITAMINS W-MINERALS/LUT (CENTRUM SILVER PO) Take 1 Tab by mouth daily.  aspirin 81 mg Tab Take 40.5 mg by mouth daily.  CALCIUM CARBONATE (CALTRATE 600 PO) Take 1 Tab by mouth two (2) times a day. No Known Allergies   Lab Results   Component Value Date/Time    WBC 3.5 12/28/2017 02:32 PM    HGB 12.0 12/28/2017 02:32 PM    HCT 36.6 12/28/2017 02:32 PM    PLATELET 382 12/94/7201 02:32 PM    MCV 82 12/28/2017 02:32 PM     Lab Results   Component Value Date/Time    Hemoglobin A1c 5.8 (H) 07/09/2018 02:09 PM    Hemoglobin A1c 5.8 (H) 12/28/2017 02:32 PM    Hemoglobin A1c 6.1 (H) 08/30/2016 03:45 PM    Glucose 86 07/09/2018 02:09 PM    LDL, calculated 119 (H) 12/28/2017 02:32 PM    Creatinine 0.86 07/09/2018 02:09 PM      Lab Results   Component Value Date/Time    Cholesterol, total 198 12/28/2017 02:32 PM    HDL Cholesterol 54 12/28/2017 02:32 PM    LDL, calculated 119 (H) 12/28/2017 02:32 PM    Triglyceride 126 12/28/2017 02:32 PM    CHOL/HDL Ratio 3.1 03/19/2016 03:51 AM     Lab Results   Component Value Date/Time    GFR est non-AA 65 07/09/2018 02:09 PM    GFR est AA 75 07/09/2018 02:09 PM    Creatinine 0.86 07/09/2018 02:09 PM    BUN 18 07/09/2018 02:09 PM    Sodium 145 (H) 07/09/2018 02:09 PM    Potassium 3.8 07/09/2018 02:09 PM    Chloride 105 07/09/2018 02:09 PM    CO2 24 07/09/2018 02:09 PM    Magnesium 2.2 05/08/2015 07:42 AM        ROS    Physical Exam   Constitutional: She appears well-developed and well-nourished. Appears stated age   Cardiovascular: Normal rate, regular rhythm and normal heart sounds. Exam reveals no gallop and no friction rub. No murmur heard. Pulmonary/Chest: Effort normal. No respiratory distress. She has no wheezes. She has no rales.    Abdominal: Soft. Bowel sounds are normal.   Musculoskeletal: She exhibits no edema. Neurological: She is alert. Skin: Skin is warm. Psychiatric: She has a normal mood and affect. Nursing note and vitals reviewed. ASSESSMENT and PLAN  Diagnoses and all orders for this visit:    1. Essential hypertension  -     CBC W/O DIFF  -     METABOLIC PANEL, BASIC  -     LIPID PANEL    2. Prediabetes  -     HEMOGLOBIN A1C WITH EAG    3. Encounter for immunization  -     INFLUENZA VACCINE INACTIVATED (IIV), SUBUNIT, ADJUVANTED, IM           This is the Subsequent Medicare Annual Wellness Exam, performed 12 months or more after the Initial AWV or the last Subsequent AWV    I have reviewed the patient's medical history in detail and updated the computerized patient record. History     Past Medical History:   Diagnosis Date    Arthritis     back    Cataract     Hypertension     Vertigo       Past Surgical History:   Procedure Laterality Date    COLONOSCOPY N/A 2/5/2019    COLONOSCOPY performed by Linda Andrews MD at [facility] ENDOSCOPY    COLONOSCOPY,REMV LESN,SNARE  11/24/2015         COLORECTAL SCRN; HI RISK IND  11/24/2015         COLORECTAL SCRN; HI RISK IND  2/5/2019         HX HYSTERECTOMY      HX OTHER SURGICAL Left 2008    Fatty tumor removed L shoulder    [place] COLSC FLX W/RMVL OF TUMOR POLYP LESION SNARE TQ  6/8/2010          Current Outpatient Medications   Medication Sig Dispense Refill    lisinopril-hydroCHLOROthiazide (PRINZIDE, ZESTORETIC) 20-12.5 mg per tablet TAKE TWO TABLETS BY MOUTH DAILY 180 Tab 2    amLODIPine (NORVASC) 5 mg tablet TAKE ONE TABLET BY MOUTH DAILY 90 Tab 2    diazePAM (VALIUM) 2 mg tablet Take one tablet by mouth every 8 hours as needed for severe dizziness 18 Tab 1    gabapentin (NEURONTIN) 100 mg capsule Take 100 mg by mouth three (3) times daily as needed.  gabapentin (NEURONTIN) 300 mg capsule Take 300 mg by mouth three (3) times daily as needed.       clotrimazole-betamethasone (LOTRISONE) topical cream Apply  to affected area two (2) times a day.  timolol (TIMOPTIC) 0.5 % ophthalmic solution Administer 1 Drop to both eyes daily.  cholecalciferol (VITAMIN D3) 1,000 unit tablet Take 1,000 Units by mouth daily.  MULTIVITAMINS W-MINERALS/LUT (CENTRUM SILVER PO) Take 1 Tab by mouth daily.  aspirin 81 mg Tab Take 40.5 mg by mouth daily.  CALCIUM CARBONATE (CALTRATE 600 PO) Take 1 Tab by mouth two (2) times a day. No Known Allergies  Family History   Problem Relation Age of Onset    Diabetes Mother     Heart Disease Mother     Hypertension Mother     Hypertension Father    [facility] Arthritis-rheumatoid Sister     Cancer Sister         stomach    Hypertension Sister     Arthritis-rheumatoid Brother     Hypertension Brother     Stroke Sister     Kidney Disease Brother     Cancer Daughter         colon     Social History     Tobacco Use    Smoking status: Never Smoker    Smokeless tobacco: Never Used   Substance Use Topics    Alcohol use: No     Patient Active Problem List   Diagnosis Code    Essential hypertension, benign I10    Osteoporosis M81.0    Depression F32.9    DDD (degenerative disc disease), lumbar M51.36    Osteopenia M85.80    BPPV (benign paroxysmal positional vertigo) H81.10    Prediabetes R73.03       Depression Risk Factor Screening:     3 most recent PHQ Screens 3/7/2017   PHQ Not Done -   Little interest or pleasure in doing things Not at all   Feeling down, depressed, irritable, or hopeless Not at all   Total Score PHQ 2 0     Alcohol Risk Factor Screening: You do not drink alcohol or very rarely. Functional Ability and Level of Safety:   Hearing Loss  Hearing is good. Activities of Daily Living  The home contains: no safety equipment. Patient does total self care    Fall Risk  Fall Risk Assessment, last 12 mths 8/22/2019   Able to walk? Yes   Fall in past 12 months? No   Fall with injury?  - Number of falls in past 12 months -   Fall Risk Score -       Abuse Screen  Patient is not abused    Cognitive Screening   Evaluation of Cognitive Function:  Has your family/caregiver stated any concerns about your memory: no  Normal    Patient Care Team   Patient Care Team:  Natasha Mcdonald MD as PCP - Grayson Ospina, RN as Nurse Carmen Rodriguez MD (Gastroenterology)  Conrado Galo MD (Physical Medicine and Rehab)  Franci Fairbanks MD (Orthopedic Surgery)  Hugo Hollins MD (Ophthalmology)  Dr. Daylin Mahoney (Optometry)    Assessment/Plan   Education and counseling provided:  Are appropriate based on today's review and evaluation  End-of-Life planning (with patient's consent)-advised completion of AMD forms  Influenza Vaccine-fluad today  Screening Mammography-ordered  tdap and shingrix recommended    Diagnoses and all orders for this visit:    1. Essential hypertension  -     CBC W/O DIFF  -     METABOLIC PANEL, BASIC  -     LIPID PANEL   Moderate elevation   Add toprol xl 25 mgqd   Nurse appt in 2 weeks for bp check   Continue medicines    2. Prediabetes  -     HEMOGLOBIN A1C WITH EAG    3. Encounter for immunization  -     INFLUENZA VACCINE INACTIVATED (IIV), SUBUNIT, ADJUVANTED, IM    4.  Chest pain   Atypical with risk factors   EKG-nsr wnl   Refer to [facility] for stress test    Health Maintenance Due   Topic Date Due    DTaP/Tdap/Td series (1 - Tdap) 03/28/1961    Shingrix Vaccine Age 50> (1 of 2) 03/28/1990    BREAST CANCER SCRN MAMMOGRAM  07/05/2019    MEDICARE YEARLY EXAM  07/10/2019    Influenza Age 9 to Adult  08/01/2019

## 2019-08-22 NOTE — PATIENT INSTRUCTIONS
Office Policies    Phone calls/patient messages:            Please allow up to 24 hours for someone in the office to contact you about your call or message. Be mindful your provider may be out of the office or your message may require further review. We encourage you to use [employer] for your messages as this is a faster, more efficient way to communicate with our office                         Medication Refills:            Prescription medications require 48-72 business hours to process. We encourage you to use [employer] for your refills. For controlled medications: Please allow 72 business hours to process. Certain medications may require you to  a written prescription at our office. NO narcotic/controlled medications will be prescribed after 4pm Monday through Friday or on weekends              Form/Paperwork Completion:            Please note a $25 fee may incur for all paperwork for completed by our providers. We ask that you allow 7-10 business days. Pre-payment is due prior to picking up/faxing the completed form. You may also download your forms to [employer] to have your doctor print off. Medicare Wellness Visit, Female     The best way to live healthy is to have a lifestyle where you eat a well-balanced diet, exercise regularly, limit alcohol use, and quit all forms of tobacco/nicotine, if applicable. Regular preventive services are another way to keep healthy. Preventive services (vaccines, screening tests, monitoring & exams) can help personalize your care plan, which helps you manage your own care. Screening tests can find health problems at the earliest stages, when they are easiest to treat. [facility] follows the current, evidence-based guidelines published by the [facility] [employer] (USPSTF) when recommending preventive services for our patients.  Because we follow these guidelines, sometimes recommendations change over time as research supports it. (For example, mammograms used to be recommended annually. Even though Medicare will still pay for an annual mammogram, the newer guidelines recommend a mammogram every two years for women of average risk.)  Of course, you and your doctor may decide to screen more often for some diseases, based on your risk and your health status. Preventive services for you include:  - Medicare offers their members a free annual wellness visit, which is time for you and your primary care provider to discuss and plan for your preventive service needs. Take advantage of this benefit every year!  -All adults over the age of 72 should receive the recommended pneumonia vaccines. Current USPSTF guidelines recommend a series of two vaccines for the best pneumonia protection.   -All adults should have a flu vaccine yearly and a tetanus vaccine every 10 years. All adults age 61 and older should receive a shingles vaccine once in their lifetime.    -A bone mass density test is recommended when a woman turns 65 to screen for osteoporosis. This test is only recommended one time, as a screening. Some providers will use this same test as a disease monitoring tool if you already have osteoporosis. -All adults age 38-68 who are overweight should have a diabetes screening test once every three years.   -Other screening tests and preventive services for persons with diabetes include: an eye exam to screen for diabetic retinopathy, a kidney function test, a foot exam, and stricter control over your cholesterol.   -Cardiovascular screening for adults with routine risk involves an electrocardiogram (ECG) at intervals determined by your doctor.   -Colorectal cancer screenings should be done for adults age 54-65 with no increased risk factors for colorectal cancer. There are a number of acceptable methods of screening for this type of cancer. Each test has its own benefits and drawbacks.  Discuss with your doctor what is most appropriate for you during your annual wellness visit. The different tests include: colonoscopy (considered the best screening method), a fecal occult blood test, a fecal DNA test, and sigmoidoscopy. -Breast cancer screenings are recommended every other year for women of normal risk, age 54-69.  -Cervical cancer screenings for women over age 72 are only recommended with certain risk factors.   -All adults born between [facility] should be screened once for Hepatitis C.      Here is a list of your current Health Maintenance items (your personalized list of preventive services) with a due date:  Health Maintenance Due   Topic Date Due    DTaP/Tdap/Td  (1 - Tdap) 03/28/1961    Shingles Vaccine (1 of 2) 03/28/1990    Mammogram  07/05/2019    Annual Well Visit  07/10/2019    Flu Vaccine  08/01/2019

## 2019-08-22 NOTE — PROGRESS NOTES
Chief Complaint   Patient presents with    Blood sugar problem     Routine    Hypertension     high bp    Labs     Routine    Headache     x 1 month     Dizziness     4-5 months    Skin Problem     rash, Pelvic area, itches, ? ringworm  x6 months, requesting GYN appt.

## 2019-08-23 LAB
BUN SERPL-MCNC: 18 MG/DL (ref 8–27)
BUN/CREAT SERPL: 21 (ref 12–28)
CALCIUM SERPL-MCNC: 9.3 MG/DL (ref 8.7–10.3)
CHLORIDE SERPL-SCNC: 103 MMOL/L (ref 96–106)
CHOLEST SERPL-MCNC: 206 MG/DL (ref 100–199)
CO2 SERPL-SCNC: 23 MMOL/L (ref 20–29)
CREAT SERPL-MCNC: 0.85 MG/DL (ref 0.57–1)
ERYTHROCYTE [DISTWIDTH] IN BLOOD BY AUTOMATED COUNT: 14.6 % (ref 12.3–15.4)
EST. AVERAGE GLUCOSE BLD GHB EST-MCNC: 120 MG/DL
GLUCOSE SERPL-MCNC: 97 MG/DL (ref 65–99)
HBA1C MFR BLD: 5.8 % (ref 4.8–5.6)
HCT VFR BLD AUTO: 37.1 % (ref 34–46.6)
HDLC SERPL-MCNC: 49 MG/DL
HGB BLD-MCNC: 12.3 G/DL (ref 11.1–15.9)
LDLC SERPL CALC-MCNC: 125 MG/DL (ref 0–99)
MCH RBC QN AUTO: 27.5 PG (ref 26.6–33)
MCHC RBC AUTO-ENTMCNC: 33.2 G/DL (ref 31.5–35.7)
MCV RBC AUTO: 83 FL (ref 79–97)
PLATELET # BLD AUTO: 230 X10E3/UL (ref 150–450)
POTASSIUM SERPL-SCNC: 3.9 MMOL/L (ref 3.5–5.2)
RBC # BLD AUTO: 4.47 X10E6/UL (ref 3.77–5.28)
SODIUM SERPL-SCNC: 141 MMOL/L (ref 134–144)
TRIGL SERPL-MCNC: 161 MG/DL (ref 0–149)
VLDLC SERPL CALC-MCNC: 32 MG/DL (ref 5–40)
WBC # BLD AUTO: 4.2 X10E3/UL (ref 3.4–10.8)

## 2019-09-13 ENCOUNTER — CLINICAL SUPPORT (OUTPATIENT)
Dept: INTERNAL MEDICINE CLINIC | Age: 79
End: 2019-09-13

## 2019-09-13 VITALS — HEART RATE: 80 BPM | SYSTOLIC BLOOD PRESSURE: 132 MMHG | DIASTOLIC BLOOD PRESSURE: 83 MMHG

## 2019-09-13 DIAGNOSIS — Z01.30 BLOOD PRESSURE CHECK: Primary | ICD-10-CM

## 2019-10-24 ENCOUNTER — OFFICE VISIT (OUTPATIENT)
Dept: INTERNAL MEDICINE CLINIC | Age: 79
End: 2019-10-24

## 2019-10-24 VITALS
TEMPERATURE: 98.6 F | BODY MASS INDEX: 29.26 KG/M2 | DIASTOLIC BLOOD PRESSURE: 80 MMHG | WEIGHT: 159 LBS | SYSTOLIC BLOOD PRESSURE: 150 MMHG | RESPIRATION RATE: 16 BRPM | HEART RATE: 78 BPM | OXYGEN SATURATION: 100 % | HEIGHT: 62 IN

## 2019-10-24 DIAGNOSIS — R07.9 CHEST PAIN, UNSPECIFIED TYPE: Primary | ICD-10-CM

## 2019-10-24 DIAGNOSIS — I10 ESSENTIAL HYPERTENSION: ICD-10-CM

## 2019-10-24 NOTE — PROGRESS NOTES
HISTORY OF PRESENT ILLNESS  Shakira Hameed is a 78 y.o. female. HPI     Here in 2 month f/u HTN and CP  toprol xl 25 mg every day was added last OV for moderate bp elevation  Repeat bp a fews later was 132/83  Was referred to [facility] for atypical CP but did not schedule the appt--has not had recurrent CP  Chest tightness was midsternal every 2 months for 5 min at rest  Reports FH CAD   last OV  F/u HTN Prediabetes ostopenia and medicare wellness---------------  Had screening colonoscopy this year--tics, no polyps -Dr Brian Goodrich, repeat 5 yrs d/t hx polyps     C/o swollen droopy right upper eyelid x 1 week  Sees eye MD tomorrow  No vision loss, slurred speech or weakness     C/o chest tightness last few months intermittently at rest maybe once per month lasting about 1 minute  No exertional CP per pt     bp up recently-taking lisinopril/hct and norvasc       Patient Active Problem List    Diagnosis Date Noted    Prediabetes 08/30/2016    BPPV (benign paroxysmal positional vertigo) 03/19/2016    Osteopenia 01/12/2016    DDD (degenerative disc disease), lumbar 02/28/2014    Essential hypertension, benign 02/21/2010    Osteoporosis 02/21/2010    Depression 02/21/2010     Current Outpatient Medications   Medication Sig Dispense Refill    lisinopril-hydroCHLOROthiazide (PRINZIDE, ZESTORETIC) 20-12.5 mg per tablet 2 every day 180 Tab 3    metoprolol succinate (TOPROL-XL) 25 mg XL tablet Take 1 Tab by mouth daily. 90 Tab 1    amLODIPine (NORVASC) 5 mg tablet TAKE ONE TABLET BY MOUTH DAILY 90 Tab 2    diazePAM (VALIUM) 2 mg tablet Take one tablet by mouth every 8 hours as needed for severe dizziness 18 Tab 1    gabapentin (NEURONTIN) 100 mg capsule Take 100 mg by mouth three (3) times daily as needed.  gabapentin (NEURONTIN) 300 mg capsule Take 300 mg by mouth three (3) times daily as needed.  clotrimazole-betamethasone (LOTRISONE) topical cream Apply  to affected area two (2) times a day.       timolol (TIMOPTIC) 0.5 % ophthalmic solution Administer 1 Drop to both eyes daily.  cholecalciferol (VITAMIN D3) 1,000 unit tablet Take 1,000 Units by mouth daily.  MULTIVITAMINS W-MINERALS/LUT (CENTRUM SILVER PO) Take 1 Tab by mouth daily.  aspirin 81 mg Tab Take 40.5 mg by mouth daily.  CALCIUM CARBONATE (CALTRATE 600 PO) Take 1 Tab by mouth two (2) times a day. No Known Allergies   Lab Results   Component Value Date/Time    WBC 4.2 08/22/2019 12:43 PM    HGB 12.3 08/22/2019 12:43 PM    HCT 37.1 08/22/2019 12:43 PM    PLATELET 386 09/64/8440 12:43 PM    MCV 83 08/22/2019 12:43 PM     Lab Results   Component Value Date/Time    GFR est non-AA 65 08/22/2019 12:43 PM    GFR est AA 75 08/22/2019 12:43 PM    Creatinine 0.85 08/22/2019 12:43 PM    BUN 18 08/22/2019 12:43 PM    Sodium 141 08/22/2019 12:43 PM    Potassium 3.9 08/22/2019 12:43 PM    Chloride 103 08/22/2019 12:43 PM    CO2 23 08/22/2019 12:43 PM    Magnesium 2.2 05/08/2015 07:42 AM        ROS    Physical Exam   Constitutional: She appears well-developed and well-nourished. Appears stated age   Cardiovascular: Normal rate, regular rhythm and normal heart sounds. Exam reveals no gallop and no friction rub. No murmur heard. Pulmonary/Chest: Effort normal and breath sounds normal. No respiratory distress. She has no wheezes. Abdominal: Soft. Bowel sounds are normal.   Musculoskeletal: She exhibits no edema. Neurological: She is alert. Psychiatric: She has a normal mood and affect. Nursing note and vitals reviewed. ASSESSMENT and PLAN  Diagnoses and all orders for this visit:    1. Chest pain, unspecified type  -     REFERRAL TO CARDIOLOGY    2. Essential hypertension   Close to goal by my check   Continue medicines, low sodium diet    Follow-up and Dispositions    · Return in about 4 months (around 2/24/2020) for htn hld chest pain prediabetes.

## 2019-10-24 NOTE — PATIENT INSTRUCTIONS
Office Policies Phone calls/patient messages: Please allow up to 24 hours for someone in the office to contact you about your call or message. Be mindful your provider may be out of the office or your message may require further review. We encourage you to use [employer] for your messages as this is a faster, more efficient way to communicate with our office Medication Refills: 
         
Prescription medications require 48-72 business hours to process. We encourage you to use [employer] for your refills. For controlled medications: Please allow 72 business hours to process. Certain medications may require you to  a written prescription at our office. NO narcotic/controlled medications will be prescribed after 4pm Monday through Friday or on weekends Form/Paperwork Completion: 
         
Please note a $25 fee may incur for all paperwork for completed by our providers. We ask that you allow 7-10 business days. Pre-payment is due prior to picking up/faxing the completed form. You may also download your forms to [employer] to have your doctor print off.

## 2019-10-24 NOTE — PROGRESS NOTES
Chief Complaint   Patient presents with    Hypertension     follow up bp    Medication Evaluation     follow up on new bp medication

## 2020-02-23 NOTE — PROGRESS NOTES
HISTORY OF PRESENT ILLNESS  Rao Feliz is a 78 y.o. female. HPI   F/u htn hld prediabetes  Did not see cardiologist for CP  Last mammogram 2018 -[place]. [facility]  Last a1c 5.8 LDl 125    Takes gabapentin tid prn for sciatica left legs  Not on a low sodium diet  Has home digital bp cuff but no home readings    Last OV  Here in 2 month f/u HTN and CP  toprol xl 25 mg every day was added last OV for moderate bp elevation  Repeat bp a fews later was 132/83  Was referred to [facility] for atypical CP but did not schedule the appt--has not had recurrent CP  Chest tightness was midsternal every 2 months for 5 min at rest  Reports FH CAD   last OV  F/u HTN Prediabetes ostopenia and medicare wellness---------------  Had screening colonoscopy this year--tics, no polyps -Dr Nan Dewey, repeat 5 yrs d/t hx polyps     C/o swollen droopy right upper eyelid x 1 week  Sees eye MD tomorrow  No vision loss, slurred speech or weakness     C/o chest tightness last few months intermittently at rest maybe once per month lasting about 1 minute  No exertional CP per pt     bp up recently-taking lisinopril/hct and norvasc          Patient Active Problem List    Diagnosis Date Noted    Prediabetes 08/30/2016    BPPV (benign paroxysmal positional vertigo) 03/19/2016    Osteopenia 01/12/2016    DDD (degenerative disc disease), lumbar 02/28/2014    Essential hypertension, benign 02/21/2010    Osteoporosis 02/21/2010    Depression 02/21/2010     Current Outpatient Medications   Medication Sig Dispense Refill    amLODIPine (NORVASC) 10 mg tablet Take 1 Tab by mouth daily. 90 Tab 3    metoprolol succinate (TOPROL-XL) 50 mg XL tablet Take 1 Tab by mouth daily.  90 Tab 3    lisinopril-hydroCHLOROthiazide (PRINZIDE, ZESTORETIC) 20-12.5 mg per tablet 2 every day 180 Tab 3    diazePAM (VALIUM) 2 mg tablet Take one tablet by mouth every 8 hours as needed for severe dizziness 18 Tab 1    gabapentin (NEURONTIN) 100 mg capsule Take 100 mg by mouth three (3) times daily as needed.  gabapentin (NEURONTIN) 300 mg capsule Take 300 mg by mouth three (3) times daily as needed.  clotrimazole-betamethasone (LOTRISONE) topical cream Apply  to affected area two (2) times a day.  timolol (TIMOPTIC) 0.5 % ophthalmic solution Administer 1 Drop to both eyes daily.  cholecalciferol (VITAMIN D3) 1,000 unit tablet Take 1,000 Units by mouth daily.  MULTIVITAMINS W-MINERALS/LUT (CENTRUM SILVER PO) Take 1 Tab by mouth daily.  aspirin 81 mg Tab Take 40.5 mg by mouth daily.  CALCIUM CARBONATE (CALTRATE 600 PO) Take 1 Tab by mouth two (2) times a day. No Known Allergies   Lab Results   Component Value Date/Time    WBC 4.2 08/22/2019 12:43 PM    HGB 12.3 08/22/2019 12:43 PM    HCT 37.1 08/22/2019 12:43 PM    PLATELET 430 84/51/5031 12:43 PM    MCV 83 08/22/2019 12:43 PM     Lab Results   Component Value Date/Time    Hemoglobin A1c 5.8 (H) 08/22/2019 12:43 PM    Hemoglobin A1c 5.8 (H) 07/09/2018 02:09 PM    Hemoglobin A1c 5.8 (H) 12/28/2017 02:32 PM    Glucose 97 08/22/2019 12:43 PM    LDL, calculated 125 (H) 08/22/2019 12:43 PM    Creatinine 0.85 08/22/2019 12:43 PM      Lab Results   Component Value Date/Time    Cholesterol, total 206 (H) 08/22/2019 12:43 PM    HDL Cholesterol 49 08/22/2019 12:43 PM    LDL, calculated 125 (H) 08/22/2019 12:43 PM    Triglyceride 161 (H) 08/22/2019 12:43 PM    CHOL/HDL Ratio 3.1 03/19/2016 03:51 AM     Lab Results   Component Value Date/Time    GFR est non-AA 65 08/22/2019 12:43 PM    GFR est AA 75 08/22/2019 12:43 PM    Creatinine 0.85 08/22/2019 12:43 PM    BUN 18 08/22/2019 12:43 PM    Sodium 141 08/22/2019 12:43 PM    Potassium 3.9 08/22/2019 12:43 PM    Chloride 103 08/22/2019 12:43 PM    CO2 23 08/22/2019 12:43 PM    Magnesium 2.2 05/08/2015 07:42 AM        ROS    Physical Exam  Vitals signs and nursing note reviewed.    Constitutional:       Appearance: She is well-developed. Comments: Appears stated age   Cardiovascular:      Rate and Rhythm: Normal rate and regular rhythm. Heart sounds: Normal heart sounds. No murmur. No friction rub. No gallop. Pulmonary:      Effort: Pulmonary effort is normal. No respiratory distress. Breath sounds: Normal breath sounds. No wheezing. Abdominal:      General: Bowel sounds are normal.      Palpations: Abdomen is soft. Neurological:      Mental Status: She is alert. ASSESSMENT and PLAN  Diagnoses and all orders for this visit:    1. Essential hypertension   Still elevated   Increase norvasc 10 mg every day   Increase toprol xl 50 mg every day   Continue lisinopril/hct 2 every day   Low sodium diet   Home bp monitoring    2. Prediabetes    Recommended low calorie diet and weight reduction     Other orders  -     amLODIPine (NORVASC) 10 mg tablet; Take 1 Tab by mouth daily. -     metoprolol succinate (TOPROL-XL) 50 mg XL tablet; Take 1 Tab by mouth daily. Follow-up and Dispositions    · Return in about 3 months (around 5/24/2020) for HTN.

## 2020-02-24 ENCOUNTER — OFFICE VISIT (OUTPATIENT)
Dept: INTERNAL MEDICINE CLINIC | Age: 80
End: 2020-02-24

## 2020-02-24 VITALS
BODY MASS INDEX: 29.81 KG/M2 | RESPIRATION RATE: 16 BRPM | TEMPERATURE: 97.9 F | HEIGHT: 62 IN | SYSTOLIC BLOOD PRESSURE: 150 MMHG | WEIGHT: 162 LBS | DIASTOLIC BLOOD PRESSURE: 92 MMHG | OXYGEN SATURATION: 98 % | HEART RATE: 75 BPM

## 2020-02-24 DIAGNOSIS — I10 ESSENTIAL HYPERTENSION: Primary | ICD-10-CM

## 2020-02-24 RX ORDER — METOPROLOL SUCCINATE 50 MG/1
50 TABLET, EXTENDED RELEASE ORAL DAILY
Qty: 90 TAB | Refills: 3 | Status: SHIPPED | OUTPATIENT
Start: 2020-02-24 | End: 2020-05-28

## 2020-02-24 RX ORDER — AMLODIPINE BESYLATE 10 MG/1
10 TABLET ORAL DAILY
Qty: 90 TAB | Refills: 3 | Status: SHIPPED | OUTPATIENT
Start: 2020-02-24 | End: 2020-05-11 | Stop reason: DRUGHIGH

## 2020-05-09 NOTE — PROGRESS NOTES
Chandrika Quinones is a [de-identified] y.o. female who was seen by synchronous (real-time) audio-video technology on 5/11/2020     [facility] [address]  [phone]     Subjective:      Chandrika Quinones is an [de-identified] YO F with pmhx HTN HLD who presents for a telemedicine visit to establish care. Referred by PCP for atypical cp: Chest tightness was midsternal every 2 months for 5 min at rest.  Patient states the chest discomfort tends to occur with lying down flat or after she drinks coffee. Family history of CAD in her mother in her 62s. The patient denies shortness of breath, orthopnea, PND, LE edema, palpitations, syncope, or presyncope.        Patient Active Problem List    Diagnosis Date Noted    Prediabetes 08/30/2016    BPPV (benign paroxysmal positional vertigo) 03/19/2016    Osteopenia 01/12/2016    DDD (degenerative disc disease), lumbar 02/28/2014    Essential hypertension, benign 02/21/2010    Osteoporosis 02/21/2010    Depression 02/21/2010      Braydon Zayas MD  Past Medical History:   Diagnosis Date    Arthritis     back    Cataract     Hypertension     Vertigo       Past Surgical History:   Procedure Laterality Date    COLONOSCOPY N/A 2/5/2019    COL[facility]  11/24/2015         COLORECTAL SCRN; HI RISK IND  11/24/2015         COLORECTAL SCRN; HI RISK IND  2/5/2019         HX HYSTERECTOMY      HX OTHER SURGICAL Left 2008    Fatty tumor removed L shoulder    NH COLSC FLX W/RMVL OF TUMOR POLYP LESION SNARE TQ  6/8/2010          No Known Allergies   Family History   Problem Relation Age of Onset    Diabetes Mother     Heart Disease Mother     Hypertension Mother     Hypertension Father    [address] [facility] Arthritis-rheumatoid Sister     Cancer Sister         stomach    Hypertension Sister     Arthritis-rheumatoid Brother     Hypertension Brother     Stroke Sister     Kidney Disease Brother     Cancer Daughter         colon      Social History     Socioeconomic History    Marital status:      Spouse name: Not on file    Number of children: Not on file    Years of education: Not on file    Highest education level: Not on file   Occupational History    Occupation:  in [address] [place] [address] [employer] resource strain: Not on file    Food insecurity     Worry: Not on file     Inability: Not on file   [employer] needs     Medical: Not on file     Non-medical: Not on file   Tobacco Use    Smoking status: Never Smoker    Smokeless tobacco: Never Used   Substance and Sexual Activity    Alcohol use: No    Drug use: No     Types: Prescription, OTC    Sexual activity: Never   Lifestyle    Physical activity     Days per week: Not on file     Minutes per session: Not on file    Stress: Not on file   Relationships    Social connections     Talks on phone: Not on file     Gets together: Not on file     Attends [employer] service: Not on file     Active member of club or organization: Not on file     Attends meetings of clubs or organizations: Not on file     Relationship status: Not on file    Intimate partner violence     Fear of current or ex partner: Not on file     Emotionally abused: Not on file     Physically abused: Not on file     Forced sexual activity: Not on file   Other Topics Concern    Not on file   Social History Narrative    Not on file      Current Outpatient Medications   Medication Sig    amLODIPine (NORVASC) 5 mg tablet Take 5 mg by mouth daily.  metoprolol succinate (TOPROL-XL) 50 mg XL tablet Take 1 Tab by mouth daily. (Patient taking differently: Take 25 mg by mouth daily. Taking 25 mg since 10/2019 due to dizziness with 50 mg.)    lisinopril-hydroCHLOROthiazide (PRINZIDE, ZESTORETIC) 20-12.5 mg per tablet 2 every day (Patient taking differently: 2 Tabs.  2 every day)    diazePAM (VALIUM) 2 mg tablet Take one tablet by mouth every 8 hours as needed for severe dizziness    gabapentin (NEURONTIN) 100 mg capsule Take 100 mg by mouth three (3) times daily as needed.  gabapentin (NEURONTIN) 300 mg capsule Take 300 mg by mouth three (3) times daily as needed.  clotrimazole-betamethasone (LOTRISONE) topical cream Apply  to affected area two (2) times a day.  timolol (TIMOPTIC) 0.5 % ophthalmic solution Administer 1 Drop to both eyes daily.  cholecalciferol (VITAMIN D3) 1,000 unit tablet Take 1,000 Units by mouth daily.  MULTIVITAMINS W-MINERALS/LUT (CENTRUM SILVER PO) Take 1 Tab by mouth daily.  aspirin 81 mg Tab Take 40.5 mg by mouth daily.  CALCIUM CARBONATE (CALTRATE 600 PO) Take 1 Tab by mouth two (2) times a day. No current facility-administered medications for this visit. Review of Symptoms:  11 systems reviewed, negative other than as stated in the HPI    Physical Exam:    Vitals:    05/11/20 1036 05/11/20 1038   BP: (!) 144/99 (!) 124/98   Pulse: 89 90   Weight:  159 lb (72.1 kg)   Height:  5' 2\" (1.575 m)     See patient reported vital signs in nursing note as applicable. Body mass index is 29.08 kg/m². General PE  Gen:  NAD  Mental Status - Alert. General Appearance - Not in acute distress. HEENT:  PER, EOM, no JVD  Chest and Lung Exam   Inspection: Accessory muscles - No use of accessory muscles in breathing. No audible wheezing. Normal effort in breathing. Symmetric excursion. Cardiovascular:  No JVD  Upper Extremity: Inspection - Bilateral - No Cyanotic nailbeds or Digital clubbing. Lower Extremity:   Palpation: Edema - Bilateral - No edema. Neuro: A&O times 3, CN and motor grossly WNL  Skin: no rashes or lesions on exposed skin, dry, intact  Psych: normal mood, affect. Speech clear.     Labs:   Lab Results   Component Value Date/Time    Cholesterol, total 206 (H) 08/22/2019 12:43 PM    Cholesterol, total 198 12/28/2017 02:32 PM    Cholesterol, total 188 03/19/2016 03:51 AM Cholesterol, total 178 11/29/2013 12:52 PM    Cholesterol, total 183 01/31/2013 09:47 AM    HDL Cholesterol 49 08/22/2019 12:43 PM    HDL Cholesterol 54 12/28/2017 02:32 PM    HDL Cholesterol 61 03/19/2016 03:51 AM    HDL Cholesterol 54 11/29/2013 12:52 PM    HDL Cholesterol 59 01/31/2013 09:47 AM    LDL, calculated 125 (H) 08/22/2019 12:43 PM    LDL, calculated 119 (H) 12/28/2017 02:32 PM    LDL, calculated 105.4 (H) 03/19/2016 03:51 AM    LDL, calculated 106 (H) 11/29/2013 12:52 PM    LDL, calculated 109 (H) 01/31/2013 09:47 AM    Triglyceride 161 (H) 08/22/2019 12:43 PM    Triglyceride 126 12/28/2017 02:32 PM    Triglyceride 108 03/19/2016 03:51 AM    Triglyceride 91 11/29/2013 12:52 PM    Triglyceride 74 01/31/2013 09:47 AM    CHOL/HDL Ratio 3.1 03/19/2016 03:51 AM    CHOL/HDL Ratio 3.5 09/30/2009 12:22 PM     Lab Results   Component Value Date/Time    CK 76 05/08/2015 07:42 AM     Lab Results   Component Value Date/Time    Sodium 141 08/22/2019 12:43 PM    Potassium 3.9 08/22/2019 12:43 PM    Chloride 103 08/22/2019 12:43 PM    CO2 23 08/22/2019 12:43 PM    Anion gap 8 03/18/2016 02:58 AM    Glucose 97 08/22/2019 12:43 PM    BUN 18 08/22/2019 12:43 PM    Creatinine 0.85 08/22/2019 12:43 PM    BUN/Creatinine ratio 21 08/22/2019 12:43 PM    GFR est AA 75 08/22/2019 12:43 PM    GFR est non-AA 65 08/22/2019 12:43 PM    Calcium 9.3 08/22/2019 12:43 PM    Bilirubin, total 0.2 03/18/2016 02:58 AM    AST (SGOT) 10 (L) 03/18/2016 02:58 AM    Alk. phosphatase 59 03/18/2016 02:58 AM    Protein, total 7.2 03/18/2016 02:58 AM    Albumin 3.6 03/18/2016 02:58 AM    Globulin 3.6 03/18/2016 02:58 AM    A-G Ratio 1.0 (L) 03/18/2016 02:58 AM    ALT (SGPT) 17 03/18/2016 02:58 AM          Assessment:      1. Atypical chest pain    2. Essential hypertension, benign    3. Mixed hyperlipidemia    4.  Family history of ischemic heart disease        Orders Placed This Encounter    amLODIPine (NORVASC) 5 mg tablet     Sig: Take 5 mg by mouth daily. Plan: This is an [de-identified] YO F with pmhx HTN HLD who presents for a telemedicine visit to establish care. Referred by PCP for atypical cp: Chest tightness was midsternal every 2 months for 5 min at rest.    Atypical cp  Cardiac risk factors: HTN HLD Prediabetes age postmenopausal female family hx CAD  On ASA BB  Obtain echo, [place]  Advised to try Pepcid and/or antacids for any food related or recumbent chest discomfort. The patient knows to go to the ED if any progression of symptoms or symptoms not relieved immediately by rest or antacids. HTN  Controlled with current therapy: BB Ace-I/hctz Amlodipine  Stable kidney function 8/19 Cr 0.85    HLD  8/19 LDL at 125  Lipids and labs followed by PCP. Continue current care and f/u in 1 year if testing is normal, and symptoms resolve with H2 blockers and antacids. Michelle Kowalski MD    This virtual visit was conducted with audiovisual connection using [employer] telemedicine services. Pursuant to the emergency declaration under the [facility], [facility] waiver authority and the [employer] Act, this Virtual Visit was conducted, with patient's consent, to reduce the patient's risk of exposure to COVID-19 and provide continuity of care for an established patient. Services were provided through a video synchronous discussion virtually to substitute for in-person clinic visit.

## 2020-05-11 ENCOUNTER — VIRTUAL VISIT (OUTPATIENT)
Dept: CARDIOLOGY CLINIC | Age: 80
End: 2020-05-11

## 2020-05-11 VITALS
HEIGHT: 62 IN | DIASTOLIC BLOOD PRESSURE: 98 MMHG | SYSTOLIC BLOOD PRESSURE: 124 MMHG | WEIGHT: 159 LBS | BODY MASS INDEX: 29.26 KG/M2 | HEART RATE: 90 BPM

## 2020-05-11 DIAGNOSIS — Z82.49 FAMILY HISTORY OF ISCHEMIC HEART DISEASE: ICD-10-CM

## 2020-05-11 DIAGNOSIS — R07.89 ATYPICAL CHEST PAIN: Primary | ICD-10-CM

## 2020-05-11 DIAGNOSIS — E78.2 MIXED HYPERLIPIDEMIA: ICD-10-CM

## 2020-05-11 DIAGNOSIS — I10 ESSENTIAL HYPERTENSION, BENIGN: ICD-10-CM

## 2020-05-11 RX ORDER — AMLODIPINE BESYLATE 5 MG/1
5 TABLET ORAL DAILY
COMMUNITY
Start: 2016-08-30 | End: 2021-11-01

## 2020-05-11 NOTE — PROGRESS NOTES
Chief Complaint   Patient presents with    Referral / Consult    Chest Pain    Hypertension    Cholesterol Problem    Patient C/O chest discomfort more noticeable when trying to swallow  food and lying down at times. . She was instructed to increase Amlodipine & Toprol last Oct 2019 but couldn't tolerate the higher doses due to dizziness went back to original dose with good results.

## 2020-05-28 ENCOUNTER — VIRTUAL VISIT (OUTPATIENT)
Dept: INTERNAL MEDICINE CLINIC | Age: 80
End: 2020-05-28

## 2020-05-28 VITALS — HEART RATE: 95 BPM | SYSTOLIC BLOOD PRESSURE: 98 MMHG | DIASTOLIC BLOOD PRESSURE: 77 MMHG

## 2020-05-28 DIAGNOSIS — I10 ESSENTIAL HYPERTENSION: Primary | ICD-10-CM

## 2020-05-28 DIAGNOSIS — R07.89 ATYPICAL CHEST PAIN: ICD-10-CM

## 2020-05-28 RX ORDER — METOPROLOL SUCCINATE 25 MG/1
25 TABLET, EXTENDED RELEASE ORAL DAILY
Qty: 1 TAB | Refills: 0
Start: 2020-05-28 | End: 2021-11-01 | Stop reason: DRUGHIGH

## 2020-05-28 NOTE — PATIENT INSTRUCTIONS
Office Policies Phone calls/patient messages: Please allow up to 24 hours for someone in the office to contact you about your call or message. Be mindful your provider may be out of the office or your message may require further review. We encourage you to use [employer] for your messages as this is a faster, more efficient way to communicate with our office This is an established visit conducted via telemedicine. The patient has been instructed that this meets HIPAA criteria and acknowledges and agrees to this method of visitation. Derek Aguilar [place] 
54/00/02 
2:09 PM 
 
 
         
Chief Complaint Patient presents with  Hypertension 3  month follow up  Medication Evaluation Review BP medication  Dizziness Low BP per patient

## 2020-05-28 NOTE — PROGRESS NOTES
HISTORY OF PRESENT ILLNESS  María Reyna is a [de-identified] y.o. female. HPI   María Reyna is a [de-identified] y.o. female being evaluated by a Virtual Visit (video visit) encounter to address concerns as mentioned above. A caregiver was present when appropriate. Due to this being a TeleHealth encounter (During FETEZ-86 public health emergency), evaluation of the following organ systems was limited: Vitals/Constitutional/EENT/Resp/CV/GI//MS/Neuro/Skin/Heme-Lymph-Imm. Pursuant to the emergency declaration under the [facility], [address] [place] [address] authority and the [employer] Act, this Virtual Visit was conducted with patient's (and/or legal guardian's) consent, to reduce the risk of exposure to COVID-19 and provide necessary medical care. Services were provided through a video synchronous discussion virtually to substitute for in-person encounter. --Jamar Ramirez MD on 5/28/2020 at 1:01 PM    An electronic signature was used to authenticate this note. F/u HTN  norvasc and toprol doses were increased last OV but felt dizzy so lowered the doses of each medication  Saw cardiologist for atypical CP--echo and nst ordered  Still has episodic chest pain at rest, often when supine --tums and rolaids helps  bp was lower at [address] [place] with cardiologist 124/98  Last week 145/93  [id number] yesterday  Today 98/77  Has been hydrating and feels better now    Last OV  F/u htn hld prediabetes  Did not see cardiologist for CP  Last mammogram 2018 -[place].  [facility]  Last a1c 5.8 LDl 125     Takes gabapentin tid prn for sciatica left legs  Not on a low sodium diet  Has home digital bp cuff but no home readings       Patient Active Problem List    Diagnosis Date Noted    Prediabetes 08/30/2016    BPPV (benign paroxysmal positional vertigo) 03/19/2016    Osteopenia 01/12/2016    DDD (degenerative disc disease), lumbar 02/28/2014    Essential hypertension, benign 02/21/2010    Osteoporosis 02/21/2010    Depression 02/21/2010     Current Outpatient Medications   Medication Sig Dispense Refill    amLODIPine (NORVASC) 5 mg tablet Take 5 mg by mouth daily.  metoprolol succinate (TOPROL-XL) 50 mg XL tablet Take 1 Tab by mouth daily. (Patient taking differently: Take 25 mg by mouth daily. Taking 25 mg since 10/2019 due to dizziness with 50 mg.) 90 Tab 3    lisinopril-hydroCHLOROthiazide (PRINZIDE, ZESTORETIC) 20-12.5 mg per tablet 2 every day (Patient taking differently: 2 Tabs. 2 every day) 180 Tab 3    diazePAM (VALIUM) 2 mg tablet Take one tablet by mouth every 8 hours as needed for severe dizziness 18 Tab 1    gabapentin (NEURONTIN) 100 mg capsule Take 100 mg by mouth three (3) times daily as needed.  gabapentin (NEURONTIN) 300 mg capsule Take 300 mg by mouth three (3) times daily as needed.  clotrimazole-betamethasone (LOTRISONE) topical cream Apply  to affected area two (2) times a day.  timolol (TIMOPTIC) 0.5 % ophthalmic solution Administer 1 Drop to both eyes daily.  cholecalciferol (VITAMIN D3) 1,000 unit tablet Take 1,000 Units by mouth daily.  MULTIVITAMINS W-MINERALS/LUT (CENTRUM SILVER PO) Take 1 Tab by mouth daily.  aspirin 81 mg Tab Take 40.5 mg by mouth daily.  CALCIUM CARBONATE (CALTRATE 600 PO) Take 1 Tab by mouth two (2) times a day.        No Known Allergies   Lab Results   Component Value Date/Time    WBC 4.2 08/22/2019 12:43 PM    HGB 12.3 08/22/2019 12:43 PM    HCT 37.1 08/22/2019 12:43 PM    PLATELET 932 50/39/5615 12:43 PM    MCV 83 08/22/2019 12:43 PM     Lab Results   Component Value Date/Time    Hemoglobin A1c 5.8 (H) 08/22/2019 12:43 PM    Hemoglobin A1c 5.8 (H) 07/09/2018 02:09 PM    Hemoglobin A1c 5.8 (H) 12/28/2017 02:32 PM    Glucose 97 08/22/2019 12:43 PM    LDL, calculated 125 (H) 08/22/2019 12:43 PM    Creatinine 0.85 08/22/2019 12:43 PM      Lab Results   Component Value Date/Time    Cholesterol, total 206 (H) 08/22/2019 12:43 PM    HDL Cholesterol 49 08/22/2019 12:43 PM    LDL, calculated 125 (H) 08/22/2019 12:43 PM    Triglyceride 161 (H) 08/22/2019 12:43 PM    CHOL/HDL Ratio 3.1 03/19/2016 03:51 AM     Lab Results   Component Value Date/Time    GFR est non-AA 65 08/22/2019 12:43 PM    GFR est AA 75 08/22/2019 12:43 PM    Creatinine 0.85 08/22/2019 12:43 PM    BUN 18 08/22/2019 12:43 PM    Sodium 141 08/22/2019 12:43 PM    Potassium 3.9 08/22/2019 12:43 PM    Chloride 103 08/22/2019 12:43 PM    CO2 23 08/22/2019 12:43 PM    Magnesium 2.2 05/08/2015 07:42 AM        ROS    Physical Exam  Vitals signs and nursing note reviewed. Constitutional:       Appearance: She is well-developed. Comments: Appears stated age   Cardiovascular:      Rate and Rhythm: Normal rate and regular rhythm. Heart sounds: Normal heart sounds. No murmur. No friction rub. No gallop. Pulmonary:      Effort: Pulmonary effort is normal. No respiratory distress. Breath sounds: Normal breath sounds. No wheezing. Abdominal:      General: Bowel sounds are normal.      Palpations: Abdomen is soft. Neurological:      Mental Status: She is alert. ASSESSMENT and PLAN  Diagnoses and all orders for this visit:    1. Essential hypertension   Labile-- need  additional readings. ? Validity of recent low home bp readings. Will get repeat BP during NST next week  2. Atypical chest pain   C/w gerd    Continue tums/rolaids   NST and echo  Other orders  -     metoprolol succinate (TOPROL-XL) 25 mg XL tablet; Take 1 Tab by mouth daily.     rtc 4 months medicare wellness and labs

## 2020-06-02 ENCOUNTER — TELEPHONE (OUTPATIENT)
Dept: CARDIOLOGY CLINIC | Age: 80
End: 2020-06-02

## 2020-06-02 NOTE — TELEPHONE ENCOUNTER
Called patient to confirm Nuclear stress test for 06/04. Reviewed with patient the need to hold all caffeine containing food, beverages and medicines for 24 hours. Pt verbalized understanding.

## 2020-06-05 ENCOUNTER — TELEPHONE (OUTPATIENT)
Dept: CARDIOLOGY CLINIC | Age: 80
End: 2020-06-05

## 2020-06-05 NOTE — TELEPHONE ENCOUNTER
Notes recorded by Miranda Thayer NP on 6/4/2020 at 1:06 PM EDT  NORMAL squeezing function. No significant valve problems.   Await stress test----

## 2020-06-05 NOTE — TELEPHONE ENCOUNTER
----- Message from Christopher Guardado NP sent at 6/5/2020  6:35 AM EDT -----  Please advise patient stress test without evidence of flow-limiting blockages in the arteries of the heart. If symptoms have resolved, follow up in 1 yr. If she is still symptomatic, VV with autl fang.  thanks

## 2020-08-23 RX ORDER — LISINOPRIL AND HYDROCHLOROTHIAZIDE 12.5; 2 MG/1; MG/1
TABLET ORAL
Qty: 180 TAB | Refills: 2 | Status: SHIPPED | OUTPATIENT
Start: 2020-08-23 | End: 2021-03-26

## 2020-12-23 RX ORDER — METOPROLOL SUCCINATE 50 MG/1
TABLET, EXTENDED RELEASE ORAL
Qty: 32 TAB | Refills: 2 | Status: SHIPPED | OUTPATIENT
Start: 2020-12-23 | End: 2021-07-06

## 2021-03-26 RX ORDER — LISINOPRIL AND HYDROCHLOROTHIAZIDE 12.5; 2 MG/1; MG/1
TABLET ORAL
Qty: 180 TAB | Refills: 1 | Status: SHIPPED | OUTPATIENT
Start: 2021-03-26 | End: 2022-11-02 | Stop reason: SDUPTHER

## 2021-03-26 RX ORDER — AMLODIPINE BESYLATE 10 MG/1
TABLET ORAL
Qty: 32 TAB | Refills: 2 | Status: SHIPPED | OUTPATIENT
Start: 2021-03-26 | End: 2021-07-06

## 2021-07-06 RX ORDER — METOPROLOL SUCCINATE 50 MG/1
TABLET, EXTENDED RELEASE ORAL
Qty: 60 TABLET | Refills: 1 | Status: SHIPPED | OUTPATIENT
Start: 2021-07-06 | End: 2021-11-01

## 2021-07-06 RX ORDER — AMLODIPINE BESYLATE 10 MG/1
TABLET ORAL
Qty: 90 TABLET | Refills: 1 | Status: SHIPPED | OUTPATIENT
Start: 2021-07-06 | End: 2022-11-02 | Stop reason: SDUPTHER

## 2021-11-01 ENCOUNTER — OFFICE VISIT (OUTPATIENT)
Dept: INTERNAL MEDICINE CLINIC | Age: 81
End: 2021-11-01
Payer: MEDICARE

## 2021-11-01 VITALS
BODY MASS INDEX: 30.55 KG/M2 | DIASTOLIC BLOOD PRESSURE: 90 MMHG | TEMPERATURE: 97.2 F | HEIGHT: 62 IN | WEIGHT: 166 LBS | SYSTOLIC BLOOD PRESSURE: 148 MMHG | OXYGEN SATURATION: 99 % | HEART RATE: 77 BPM | RESPIRATION RATE: 16 BRPM

## 2021-11-01 DIAGNOSIS — Z23 NEEDS FLU SHOT: ICD-10-CM

## 2021-11-01 DIAGNOSIS — Z01.818 PREOP EXAMINATION: ICD-10-CM

## 2021-11-01 DIAGNOSIS — Z00.00 MEDICARE ANNUAL WELLNESS VISIT, SUBSEQUENT: ICD-10-CM

## 2021-11-01 DIAGNOSIS — E78.00 PURE HYPERCHOLESTEROLEMIA: ICD-10-CM

## 2021-11-01 DIAGNOSIS — I10 HYPERTENSION, UNSPECIFIED TYPE: Primary | ICD-10-CM

## 2021-11-01 DIAGNOSIS — R73.03 PREDIABETES: ICD-10-CM

## 2021-11-01 LAB
ANION GAP SERPL CALC-SCNC: 4 MMOL/L (ref 5–15)
BUN SERPL-MCNC: 18 MG/DL (ref 6–20)
BUN/CREAT SERPL: 20 (ref 12–20)
CALCIUM SERPL-MCNC: 9.4 MG/DL (ref 8.5–10.1)
CHLORIDE SERPL-SCNC: 110 MMOL/L (ref 97–108)
CHOLEST SERPL-MCNC: 199 MG/DL
CO2 SERPL-SCNC: 27 MMOL/L (ref 21–32)
CREAT SERPL-MCNC: 0.9 MG/DL (ref 0.55–1.02)
ERYTHROCYTE [DISTWIDTH] IN BLOOD BY AUTOMATED COUNT: 14.4 % (ref 11.5–14.5)
EST. AVERAGE GLUCOSE BLD GHB EST-MCNC: 123 MG/DL
GLUCOSE SERPL-MCNC: 91 MG/DL (ref 65–100)
HBA1C MFR BLD: 5.9 % (ref 4–5.6)
HCT VFR BLD AUTO: 39.6 % (ref 35–47)
HDLC SERPL-MCNC: 60 MG/DL
HDLC SERPL: 3.3 {RATIO} (ref 0–5)
HGB BLD-MCNC: 12.1 G/DL (ref 11.5–16)
LDLC SERPL CALC-MCNC: 117 MG/DL (ref 0–100)
MCH RBC QN AUTO: 26.6 PG (ref 26–34)
MCHC RBC AUTO-ENTMCNC: 30.6 G/DL (ref 30–36.5)
MCV RBC AUTO: 87 FL (ref 80–99)
NRBC # BLD: 0 K/UL (ref 0–0.01)
NRBC BLD-RTO: 0 PER 100 WBC
PLATELET # BLD AUTO: 256 K/UL (ref 150–400)
PMV BLD AUTO: 11.6 FL (ref 8.9–12.9)
POTASSIUM SERPL-SCNC: 3.9 MMOL/L (ref 3.5–5.1)
RBC # BLD AUTO: 4.55 M/UL (ref 3.8–5.2)
SODIUM SERPL-SCNC: 141 MMOL/L (ref 136–145)
TRIGL SERPL-MCNC: 110 MG/DL (ref ?–150)
VLDLC SERPL CALC-MCNC: 22 MG/DL
WBC # BLD AUTO: 5.4 K/UL (ref 3.6–11)

## 2021-11-01 PROCEDURE — 99214 OFFICE O/P EST MOD 30 MIN: CPT | Performed by: INTERNAL MEDICINE

## 2021-11-01 PROCEDURE — G8536 NO DOC ELDER MAL SCRN: HCPCS | Performed by: INTERNAL MEDICINE

## 2021-11-01 PROCEDURE — 1090F PRES/ABSN URINE INCON ASSESS: CPT | Performed by: INTERNAL MEDICINE

## 2021-11-01 PROCEDURE — G0463 HOSPITAL OUTPT CLINIC VISIT: HCPCS | Performed by: INTERNAL MEDICINE

## 2021-11-01 PROCEDURE — G8755 DIAS BP > OR = 90: HCPCS | Performed by: INTERNAL MEDICINE

## 2021-11-01 PROCEDURE — G0439 PPPS, SUBSEQ VISIT: HCPCS | Performed by: INTERNAL MEDICINE

## 2021-11-01 PROCEDURE — G9717 DOC PT DX DEP/BP F/U NT REQ: HCPCS | Performed by: INTERNAL MEDICINE

## 2021-11-01 PROCEDURE — G8427 DOCREV CUR MEDS BY ELIG CLIN: HCPCS | Performed by: INTERNAL MEDICINE

## 2021-11-01 PROCEDURE — 1101F PT FALLS ASSESS-DOCD LE1/YR: CPT | Performed by: INTERNAL MEDICINE

## 2021-11-01 PROCEDURE — G8753 SYS BP > OR = 140: HCPCS | Performed by: INTERNAL MEDICINE

## 2021-11-01 PROCEDURE — 90694 VACC AIIV4 NO PRSRV 0.5ML IM: CPT | Performed by: INTERNAL MEDICINE

## 2021-11-01 PROCEDURE — G8417 CALC BMI ABV UP PARAM F/U: HCPCS | Performed by: INTERNAL MEDICINE

## 2021-11-01 RX ORDER — METOPROLOL SUCCINATE 50 MG/1
50 TABLET, EXTENDED RELEASE ORAL DAILY
Qty: 90 TABLET | Refills: 3 | Status: SHIPPED | OUTPATIENT
Start: 2021-11-01 | End: 2022-11-02 | Stop reason: SDUPTHER

## 2021-11-01 NOTE — LETTER
11/1/2021 2:37 PM    Ms. Shahida Villafana  [address] [id number]        Current Outpatient Medications on File Prior to Visit   Medication Sig Dispense Refill    amLODIPine (NORVASC) 10 mg tablet TAKE ONE TABLET BY MOUTH DAILY 90 Tablet 1    lisinopril-hydroCHLOROthiazide (PRINZIDE, ZESTORETIC) 20-12.5 mg per tablet TAKE TWO TABLETS BY MOUTH DAILY 180 Tab 1    metoprolol succinate (TOPROL-XL) 25 mg XL tablet Take 1 Tab by mouth daily. 1 Tab 0    gabapentin (NEURONTIN) 100 mg capsule Take 100 mg by mouth three (3) times daily as needed.  gabapentin (NEURONTIN) 300 mg capsule Take 300 mg by mouth three (3) times daily as needed.  timolol (TIMOPTIC) 0.5 % ophthalmic solution Administer 1 Drop to both eyes daily.  cholecalciferol (VITAMIN D3) 1,000 unit tablet Take 1,000 Units by mouth daily.  MULTIVITAMINS W-MINERALS/LUT (CENTRUM SILVER PO) Take 1 Tab by mouth daily.  CALCIUM CARBONATE (CALTRATE 600 PO) Take 1 Tab by mouth two (2) times a day.  [DISCONTINUED] metoprolol succinate (TOPROL-XL) 50 mg XL tablet TAKE ONE TABLET BY MOUTH DAILY 60 Tablet 1    [DISCONTINUED] amLODIPine (NORVASC) 5 mg tablet Take 5 mg by mouth daily. (Patient not taking: Reported on 11/1/2021)      diazePAM (VALIUM) 2 mg tablet Take one tablet by mouth every 8 hours as needed for severe dizziness 18 Tab 1    clotrimazole-betamethasone (LOTRISONE) topical cream Apply  to affected area two (2) times a day.  aspirin 81 mg Tab Take 40.5 mg by mouth daily. (Patient not taking: Reported on 11/1/2021)       No current facility-administered medications on file prior to visit.              Sincerely,          Craig Dyson MD

## 2021-11-01 NOTE — PATIENT INSTRUCTIONS

## 2021-11-01 NOTE — PROGRESS NOTES
Salome Abrams is a 80 y.o. female who presents for routine immunizations. She denies any symptoms , reactions or allergies that would exclude them from being immunized today. Risks and adverse reactions were discussed and the VIS was given to them. All questions were addressed. She was observed for 20 min post injection. There were no reactions observed.     Otoniel Kendrick LPN

## 2022-04-11 ENCOUNTER — OFFICE VISIT (OUTPATIENT)
Dept: INTERNAL MEDICINE CLINIC | Age: 82
End: 2022-04-11
Payer: MEDICARE

## 2022-04-11 VITALS
HEART RATE: 74 BPM | WEIGHT: 168 LBS | BODY MASS INDEX: 30.91 KG/M2 | DIASTOLIC BLOOD PRESSURE: 80 MMHG | RESPIRATION RATE: 16 BRPM | TEMPERATURE: 97.3 F | HEIGHT: 62 IN | SYSTOLIC BLOOD PRESSURE: 138 MMHG | OXYGEN SATURATION: 99 %

## 2022-04-11 DIAGNOSIS — I10 HYPERTENSION, UNSPECIFIED TYPE: ICD-10-CM

## 2022-04-11 DIAGNOSIS — B37.31 VAGINAL YEAST INFECTION: ICD-10-CM

## 2022-04-11 DIAGNOSIS — Z12.39 BREAST SCREENING: Primary | ICD-10-CM

## 2022-04-11 PROCEDURE — G8752 SYS BP LESS 140: HCPCS | Performed by: INTERNAL MEDICINE

## 2022-04-11 PROCEDURE — G8536 NO DOC ELDER MAL SCRN: HCPCS | Performed by: INTERNAL MEDICINE

## 2022-04-11 PROCEDURE — G0463 HOSPITAL OUTPT CLINIC VISIT: HCPCS | Performed by: INTERNAL MEDICINE

## 2022-04-11 PROCEDURE — G8417 CALC BMI ABV UP PARAM F/U: HCPCS | Performed by: INTERNAL MEDICINE

## 2022-04-11 PROCEDURE — G9717 DOC PT DX DEP/BP F/U NT REQ: HCPCS | Performed by: INTERNAL MEDICINE

## 2022-04-11 PROCEDURE — G8427 DOCREV CUR MEDS BY ELIG CLIN: HCPCS | Performed by: INTERNAL MEDICINE

## 2022-04-11 PROCEDURE — 99213 OFFICE O/P EST LOW 20 MIN: CPT | Performed by: INTERNAL MEDICINE

## 2022-04-11 PROCEDURE — G8754 DIAS BP LESS 90: HCPCS | Performed by: INTERNAL MEDICINE

## 2022-04-11 PROCEDURE — 1101F PT FALLS ASSESS-DOCD LE1/YR: CPT | Performed by: INTERNAL MEDICINE

## 2022-04-11 PROCEDURE — 1090F PRES/ABSN URINE INCON ASSESS: CPT | Performed by: INTERNAL MEDICINE

## 2022-04-11 RX ORDER — GABAPENTIN 100 MG/1
100 CAPSULE ORAL
COMMUNITY

## 2022-04-11 RX ORDER — FLUCONAZOLE 150 MG/1
150 TABLET ORAL DAILY
Qty: 1 TABLET | Refills: 0 | Status: SHIPPED | OUTPATIENT
Start: 2022-04-11 | End: 2022-04-12

## 2022-04-11 NOTE — PROGRESS NOTES
HISTORY OF PRESENT ILLNESS  Whitley Parry is a 80 y.o. female. HPI   Fu HTN HLD prediabetes. S/p cataract surgery since last OV  develpoed rash on face due to N95 mask  On fungal pills of vag yeast infection treated at [facility] recently--still has some itching in vaginal area  Last a1c 5.9     Last OV       Works parttime -walks to work  No cp or sob  No difficulty in the past with surgery or anesthesia    Patient Active Problem List    Diagnosis Date Noted    Prediabetes 08/30/2016    BPPV (benign paroxysmal positional vertigo) 03/19/2016    Osteopenia 01/12/2016    DDD (degenerative disc disease), lumbar 02/28/2014    Essential hypertension, benign 02/21/2010    Osteoporosis 02/21/2010    Depression 02/21/2010     Current Outpatient Medications   Medication Sig Dispense Refill    metoprolol succinate (TOPROL-XL) 50 mg XL tablet Take 1 Tablet by mouth daily. 90 Tablet 3    amLODIPine (NORVASC) 10 mg tablet TAKE ONE TABLET BY MOUTH DAILY 90 Tablet 1    lisinopril-hydroCHLOROthiazide (PRINZIDE, ZESTORETIC) 20-12.5 mg per tablet TAKE TWO TABLETS BY MOUTH DAILY 180 Tab 1    diazePAM (VALIUM) 2 mg tablet Take one tablet by mouth every 8 hours as needed for severe dizziness 18 Tab 1    gabapentin (NEURONTIN) 100 mg capsule Take 100 mg by mouth three (3) times daily as needed.  gabapentin (NEURONTIN) 300 mg capsule Take 300 mg by mouth three (3) times daily as needed.  clotrimazole-betamethasone (LOTRISONE) topical cream Apply  to affected area two (2) times a day.  timolol (TIMOPTIC) 0.5 % ophthalmic solution Administer 1 Drop to both eyes daily.  cholecalciferol (VITAMIN D3) 1,000 unit tablet Take 1,000 Units by mouth daily.  MULTIVITAMINS W-MINERALS/LUT (CENTRUM SILVER PO) Take 1 Tab by mouth daily.  aspirin 81 mg Tab Take 40.5 mg by mouth daily.  (Patient not taking: Reported on 11/1/2021)      CALCIUM CARBONATE (CALTRATE 600 PO) Take 1 Tab by mouth two (2) times a day. No Known Allergies   Lab Results   Component Value Date/Time    WBC 5.4 11/01/2021 03:36 PM    HGB 12.1 11/01/2021 03:36 PM    HCT 39.6 11/01/2021 03:36 PM    PLATELET 059 46/92/1250 03:36 PM    MCV 87.0 11/01/2021 03:36 PM     Lab Results   Component Value Date/Time    Hemoglobin A1c 5.9 (H) 11/01/2021 03:36 PM    Hemoglobin A1c 5.8 (H) 08/22/2019 12:43 PM    Hemoglobin A1c 5.8 (H) 07/09/2018 02:09 PM    Glucose 91 11/01/2021 03:36 PM    LDL, calculated 117 (H) 11/01/2021 03:36 PM    Creatinine 0.90 11/01/2021 03:36 PM      Lab Results   Component Value Date/Time    Cholesterol, total 199 11/01/2021 03:36 PM    HDL Cholesterol 60 11/01/2021 03:36 PM    LDL, calculated 117 (H) 11/01/2021 03:36 PM    Triglyceride 110 11/01/2021 03:36 PM    CHOL/HDL Ratio 3.3 11/01/2021 03:36 PM     Lab Results   Component Value Date/Time    GFR est non-AA >60 11/01/2021 03:36 PM    GFR est AA >60 11/01/2021 03:36 PM    Creatinine 0.90 11/01/2021 03:36 PM    BUN 18 11/01/2021 03:36 PM    Sodium 141 11/01/2021 03:36 PM    Potassium 3.9 11/01/2021 03:36 PM    Chloride 110 (H) 11/01/2021 03:36 PM    CO2 27 11/01/2021 03:36 PM    Magnesium 2.2 05/08/2015 07:42 AM        ROS    Physical Exam  Vitals and nursing note reviewed. Constitutional:       Appearance: Normal appearance. She is well-developed. She is obese. Comments: Appears stated age   Cardiovascular:      Rate and Rhythm: Normal rate and regular rhythm. Heart sounds: Normal heart sounds. No murmur heard. No friction rub. No gallop. Pulmonary:      Effort: Pulmonary effort is normal. No respiratory distress. Breath sounds: Normal breath sounds. No wheezing. Abdominal:      General: Bowel sounds are normal.      Palpations: Abdomen is soft. Neurological:      Mental Status: She is alert. ASSESSMENT and PLAN  Diagnoses and all orders for this visit:    1. Breast screening  -     [facility] MAMMO BI SCREENING INCL CAD;  [facility]    2. Hypertension, unspecified type   controlled  3. Vaginal yeast infection   Diflucan x 1 dose  Other orders  -     fluconazole (DIFLUCAN) 150 mg tablet; Take 1 Tablet by mouth daily for 1 day. FDA advises cautious prescribing of oral fluconazole in pregnancy. Follow-up and Dispositions    · Return in about 6 months (around 10/11/2022) for medicare welness--. [de-identified] : Patient referred by Dr. Manzo for evaluation of neck mass. Patient underwent a thyroidectomy in 1999 for a small thyroid cancer followed by radioactive iodine. Patient recently noted a neck mass. Neck ultrasound September 2020: Normal submandibular glands with small benign appearing lymph nodes.  Followup ultrasound, February 2021: Small lymph nodes, unchanged. Blood work 1/2021: TSH 1.4, free T4 1.6, free T3 2.3, thyroglobulin antibodies negative.  Patient denies fever, chills, sweats, change in weight, dysphagia. Patient reports occasional hoarseness. I have reviewed all old and new data and available images.

## 2022-04-11 NOTE — PROGRESS NOTES
1. \"Have you been to the ER, urgent care clinic since your last visit? Hospitalized since your last visit? \"  Yes , [facility]    2. \"Have you seen or consulted any other health care providers outside of the [address] [place] [address] since your last visit? \" No    3. For patients aged 39-70: Has the patient had a colonoscopy / FIT/ Cologuard? Yes. Noted in chart. Last colonoscopy 2019      If the patient is female:    4. For patients aged 41-77: Has the patient had a mammogram within the past 2 years? Yes      5. For patients aged 21-65: Has the patient had a pap smear?  No

## 2022-04-11 NOTE — PATIENT INSTRUCTIONS
Office Policies    Phone calls/patient messages:            Please allow up to 24 hours for someone in the office to contact you about your call or message. Be mindful your provider may be out of the office or your message may require further review. We encourage you to use [employer] for your messages as this is a faster, more efficient way to communicate with our office                         Medication Refills:            Prescription medications require 48-72 business hours to process. We encourage you to use [employer] for your refills. For controlled medications: Please allow 72 business hours to process. Certain medications may require you to  a written prescription at our office. NO narcotic/controlled medications will be prescribed after 4pm Monday through Friday or on weekends              Form/Paperwork Completion:            Please note a $25 fee may incur for all paperwork for completed by our providers. We ask that you allow 7-10 business days. Pre-payment is due prior to picking up/faxing the completed form. You may also download your forms to [employer] to have your doctor print off.

## 2022-04-19 ENCOUNTER — TRANSCRIBE ORDER (OUTPATIENT)
Dept: SCHEDULING | Age: 82
End: 2022-04-19

## 2022-04-19 DIAGNOSIS — Z12.31 SCREENING MAMMOGRAM FOR HIGH-RISK PATIENT: Primary | ICD-10-CM

## 2022-05-31 ENCOUNTER — HOSPITAL ENCOUNTER (OUTPATIENT)
Dept: MAMMOGRAPHY | Age: 82
Discharge: HOME | End: 2022-05-31
Attending: INTERNAL MEDICINE
Payer: MEDICARE

## 2022-05-31 DIAGNOSIS — Z12.31 SCREENING MAMMOGRAM FOR HIGH-RISK PATIENT: ICD-10-CM

## 2022-05-31 PROCEDURE — 77067 SCR MAMMO BI INCL CAD: CPT

## 2022-11-02 ENCOUNTER — OFFICE VISIT (OUTPATIENT)
Dept: INTERNAL MEDICINE CLINIC | Age: 82
End: 2022-11-02
Payer: MEDICARE

## 2022-11-02 VITALS
BODY MASS INDEX: 31.98 KG/M2 | OXYGEN SATURATION: 96 % | SYSTOLIC BLOOD PRESSURE: 160 MMHG | HEIGHT: 61 IN | TEMPERATURE: 97.4 F | RESPIRATION RATE: 16 BRPM | HEART RATE: 82 BPM | DIASTOLIC BLOOD PRESSURE: 98 MMHG | WEIGHT: 169.4 LBS

## 2022-11-02 DIAGNOSIS — Z00.00 MEDICARE ANNUAL WELLNESS VISIT, SUBSEQUENT: ICD-10-CM

## 2022-11-02 DIAGNOSIS — E55.9 VITAMIN D DEFICIENCY: ICD-10-CM

## 2022-11-02 DIAGNOSIS — R73.03 PREDIABETES: ICD-10-CM

## 2022-11-02 DIAGNOSIS — M85.80 OSTEOPENIA, UNSPECIFIED LOCATION: ICD-10-CM

## 2022-11-02 DIAGNOSIS — Z78.0 MENOPAUSE: ICD-10-CM

## 2022-11-02 DIAGNOSIS — E78.5 HYPERLIPIDEMIA, UNSPECIFIED HYPERLIPIDEMIA TYPE: ICD-10-CM

## 2022-11-02 DIAGNOSIS — I10 HYPERTENSION, UNSPECIFIED TYPE: Primary | ICD-10-CM

## 2022-11-02 DIAGNOSIS — Z23 NEEDS FLU SHOT: ICD-10-CM

## 2022-11-02 LAB
25(OH)D3 SERPL-MCNC: 14 NG/ML (ref 30–100)
ALT SERPL-CCNC: 18 U/L (ref 12–78)
ANION GAP SERPL CALC-SCNC: 3 MMOL/L (ref 5–15)
AST SERPL-CCNC: 9 U/L (ref 15–37)
BUN SERPL-MCNC: 15 MG/DL (ref 6–20)
BUN/CREAT SERPL: 19 (ref 12–20)
CALCIUM SERPL-MCNC: 8.8 MG/DL (ref 8.5–10.1)
CHLORIDE SERPL-SCNC: 108 MMOL/L (ref 97–108)
CHOLEST SERPL-MCNC: 191 MG/DL
CO2 SERPL-SCNC: 29 MMOL/L (ref 21–32)
CREAT SERPL-MCNC: 0.8 MG/DL (ref 0.55–1.02)
ERYTHROCYTE [DISTWIDTH] IN BLOOD BY AUTOMATED COUNT: 14.5 % (ref 11.5–14.5)
EST. AVERAGE GLUCOSE BLD GHB EST-MCNC: 123 MG/DL
GLUCOSE SERPL-MCNC: 98 MG/DL (ref 65–100)
HBA1C MFR BLD: 5.9 % (ref 4–5.6)
HCT VFR BLD AUTO: 41.2 % (ref 35–47)
HDLC SERPL-MCNC: 59 MG/DL
HDLC SERPL: 3.2 {RATIO} (ref 0–5)
HGB BLD-MCNC: 12.7 G/DL (ref 11.5–16)
LDLC SERPL CALC-MCNC: 112.4 MG/DL (ref 0–100)
MCH RBC QN AUTO: 27.4 PG (ref 26–34)
MCHC RBC AUTO-ENTMCNC: 30.8 G/DL (ref 30–36.5)
MCV RBC AUTO: 89 FL (ref 80–99)
NRBC # BLD: 0 K/UL (ref 0–0.01)
NRBC BLD-RTO: 0 PER 100 WBC
PLATELET # BLD AUTO: 245 K/UL (ref 150–400)
PMV BLD AUTO: 11.6 FL (ref 8.9–12.9)
POTASSIUM SERPL-SCNC: 4 MMOL/L (ref 3.5–5.1)
RBC # BLD AUTO: 4.63 M/UL (ref 3.8–5.2)
SODIUM SERPL-SCNC: 140 MMOL/L (ref 136–145)
TRIGL SERPL-MCNC: 98 MG/DL (ref ?–150)
TSH SERPL DL<=0.05 MIU/L-ACNC: 2.8 UIU/ML (ref 0.36–3.74)
VLDLC SERPL CALC-MCNC: 19.6 MG/DL
WBC # BLD AUTO: 5.5 K/UL (ref 3.6–11)

## 2022-11-02 PROCEDURE — 90694 VACC AIIV4 NO PRSRV 0.5ML IM: CPT | Performed by: INTERNAL MEDICINE

## 2022-11-02 PROCEDURE — 99213 OFFICE O/P EST LOW 20 MIN: CPT | Performed by: INTERNAL MEDICINE

## 2022-11-02 PROCEDURE — G8536 NO DOC ELDER MAL SCRN: HCPCS | Performed by: INTERNAL MEDICINE

## 2022-11-02 PROCEDURE — G9717 DOC PT DX DEP/BP F/U NT REQ: HCPCS | Performed by: INTERNAL MEDICINE

## 2022-11-02 PROCEDURE — 1090F PRES/ABSN URINE INCON ASSESS: CPT | Performed by: INTERNAL MEDICINE

## 2022-11-02 PROCEDURE — G0439 PPPS, SUBSEQ VISIT: HCPCS | Performed by: INTERNAL MEDICINE

## 2022-11-02 PROCEDURE — G8753 SYS BP > OR = 140: HCPCS | Performed by: INTERNAL MEDICINE

## 2022-11-02 PROCEDURE — G8417 CALC BMI ABV UP PARAM F/U: HCPCS | Performed by: INTERNAL MEDICINE

## 2022-11-02 PROCEDURE — G8427 DOCREV CUR MEDS BY ELIG CLIN: HCPCS | Performed by: INTERNAL MEDICINE

## 2022-11-02 PROCEDURE — G8755 DIAS BP > OR = 90: HCPCS | Performed by: INTERNAL MEDICINE

## 2022-11-02 PROCEDURE — G0463 HOSPITAL OUTPT CLINIC VISIT: HCPCS | Performed by: INTERNAL MEDICINE

## 2022-11-02 PROCEDURE — 1101F PT FALLS ASSESS-DOCD LE1/YR: CPT | Performed by: INTERNAL MEDICINE

## 2022-11-02 RX ORDER — AMLODIPINE BESYLATE 10 MG/1
10 TABLET ORAL DAILY
Qty: 90 TABLET | Refills: 3 | Status: SHIPPED | OUTPATIENT
Start: 2022-11-02

## 2022-11-02 RX ORDER — DOXAZOSIN 1 MG/1
1 TABLET ORAL DAILY
Qty: 90 TABLET | Refills: 0 | Status: SHIPPED | OUTPATIENT
Start: 2022-11-02

## 2022-11-02 RX ORDER — METOPROLOL SUCCINATE 50 MG/1
50 TABLET, EXTENDED RELEASE ORAL DAILY
Qty: 90 TABLET | Refills: 3 | Status: SHIPPED | OUTPATIENT
Start: 2022-11-02

## 2022-11-02 RX ORDER — LISINOPRIL AND HYDROCHLOROTHIAZIDE 12.5; 2 MG/1; MG/1
TABLET ORAL
Qty: 180 TABLET | Refills: 3 | Status: SHIPPED | OUTPATIENT
Start: 2022-11-02

## 2022-11-02 NOTE — PROGRESS NOTES
1. \"Have you been to the ER, urgent care clinic since your last visit? Hospitalized since your last visit? \" No    2. \"Have you seen or consulted any other health care providers outside of the [address] [place] [address] since your last visit? \" No     3. For patients aged 39-70: Has the patient had a colonoscopy / FIT/ Cologuard? NA - based on age      If the patient is female:    4. For patients aged 41-77: Has the patient had a mammogram within the past 2 years? Yes - [facility] present. Most recent result on file      5. For patients aged 21-65: Has the patient had a pap smear?  NA - based on age or sex

## 2022-11-02 NOTE — PATIENT INSTRUCTIONS
Office Policies    Phone calls/patient messages:            Please allow up to 24 hours for someone in the office to contact you about your call or message. Be mindful your provider may be out of the office or your message may require further review. We encourage you to use [employer] for your messages as this is a faster, more efficient way to communicate with our office                         Medication Refills:            Prescription medications require 48-72 business hours to process. We encourage you to use [employer] for your refills. For controlled medications: Please allow 72 business hours to process. Certain medications may require you to  a written prescription at our office. NO narcotic/controlled medications will be prescribed after 4pm Monday through Friday or on weekends              Form/Paperwork Completion:            Please note a $25 fee may incur for all paperwork for completed by our providers. We ask that you allow 7-10 business days. Pre-payment is due prior to picking up/faxing the completed form. You may also download your forms to [employer] to have your doctor print off. Medicare Wellness Visit, Female     The best way to live healthy is to have a lifestyle where you eat a well-balanced diet, exercise regularly, limit alcohol use, and quit all forms of tobacco/nicotine, if applicable. Regular preventive services are another way to keep healthy. Preventive services (vaccines, screening tests, monitoring & exams) can help personalize your care plan, which helps you manage your own care. Screening tests can find health problems at the earliest stages, when they are easiest to treat. [facility] follows the current, evidence-based guidelines published by the [facility] (USPSTF) when recommending preventive services for our patients.  Because we follow these guidelines, sometimes recommendations change over time as research supports it. (For example, mammograms used to be recommended annually. Even though Medicare will still pay for an annual mammogram, the newer guidelines recommend a mammogram every two years for women of average risk). Of course, you and your doctor may decide to screen more often for some diseases, based on your risk and your co-morbidities (chronic disease you are already diagnosed with). Preventive services for you include:  - Medicare offers their members a free annual wellness visit, which is time for you and your primary care provider to discuss and plan for your preventive service needs. Take advantage of this benefit every year!  -All adults over the age of 72 should receive the recommended pneumonia vaccines. Current USPSTF guidelines recommend a series of two vaccines for the best pneumonia protection.   -All adults should have a flu vaccine yearly and a tetanus vaccine every 10 years.   -All adults age 48 and older should receive the shingles vaccines (series of two vaccines). -All adults age 38-68 who are overweight should have a diabetes screening test once every three years.   -All adults born between [birth year] and 1965 should be screened once for Hepatitis C.  -Other screening tests and preventive services for persons with diabetes include: an eye exam to screen for diabetic retinopathy, a kidney function test, a foot exam, and stricter control over your cholesterol.   -Cardiovascular screening for adults with routine risk involves an electrocardiogram (ECG) at intervals determined by your doctor.   -Colorectal cancer screenings should be done for adults age 54-65 with no increased risk factors for colorectal cancer. There are a number of acceptable methods of screening for this type of cancer. Each test has its own benefits and drawbacks. Discuss with your doctor what is most appropriate for you during your annual wellness visit.  The different tests include: colonoscopy (considered the best screening method), a fecal occult blood test, a fecal DNA test, and sigmoidoscopy.    -A bone mass density test is recommended when a woman turns 65 to screen for osteoporosis. This test is only recommended one time, as a screening. Some providers will use this same test as a disease monitoring tool if you already have osteoporosis. -Breast cancer screenings are recommended every other year for women of normal risk, age 54-69.  -Cervical cancer screenings for women over age 72 are only recommended with certain risk factors.      Here is a list of your current Health Maintenance items (your personalized list of preventive services) with a due date:  Health Maintenance Due   Topic Date Due    DTaP/Tdap/Td  (1 - Tdap) Never done    Shingles Vaccine (1 of 2) Never done    COVID-19 Vaccine (4 - Booster for 7fgame series) 01/08/2022

## 2022-11-02 NOTE — PROGRESS NOTES
Amena Trujillo is a 80 y.o. female who presents for routine immunizations. She denies any symptoms , reactions or allergies that would exclude them from being immunized today. Risks and adverse reactions were discussed and the VIS was given to them. All questions were addressed. She was observed for 15 min post injection. There were no reactions observed.     Isidoro Stephen LPN

## 2022-11-02 NOTE — PROGRESS NOTES
HISTORY OF PRESENT ILLNESS  Lindy Palacio is a 80 y.o. female. HPI  Fu HTN HLD prediabetes osteopenia of hip and spine , left sciatica and medicare wellness---  Reports med adherence  No recent home bp readings  No chest pain  Has intermittent sciatica-takes neurontin prn      Last OV  S/p cataract surgery since last OV  develpoed rash on face due to N95 mask  On fungal pills of vag [facility]--still has some itching in vaginal area  Last a1c 5.9        Patient Active Problem List    Diagnosis Date Noted    Prediabetes 08/30/2016    BPPV (benign paroxysmal positional vertigo) 03/19/2016    Osteopenia 01/12/2016    DDD (degenerative disc disease), lumbar 02/28/2014    Essential hypertension, benign 02/21/2010    Osteoporosis 02/21/2010    Depression 02/21/2010     Current Outpatient Medications   Medication Sig Dispense Refill    gabapentin (NEURONTIN) 100 mg capsule Take 100 mg by mouth once as needed for Pain.      metoprolol succinate (TOPROL-XL) 50 mg XL tablet Take 1 Tablet by mouth daily. 90 Tablet 3    amLODIPine (NORVASC) 10 mg tablet TAKE ONE TABLET BY MOUTH DAILY 90 Tablet 1    lisinopril-hydroCHLOROthiazide (PRINZIDE, ZESTORETIC) 20-12.5 mg per tablet TAKE TWO TABLETS BY MOUTH DAILY 180 Tab 1    gabapentin (NEURONTIN) 300 mg capsule Take 300 mg by mouth three (3) times daily as needed. timolol (TIMOPTIC) 0.5 % ophthalmic solution Administer 1 Drop to both eyes daily. cholecalciferol (VITAMIN D3) 1,000 unit tablet Take 1,000 Units by mouth daily. MULTIVITAMINS W-MINERALS/LUT (CENTRUM SILVER PO) Take 1 Tab by mouth daily. aspirin 81 mg Tab Take 40.5 mg by mouth daily. (Patient not taking: Reported on 11/1/2021)      CALCIUM CARBONATE (CALTRATE 600 PO) Take 1 Tab by mouth two (2) times a day.        No Known Allergies   Lab Results   Component Value Date/Time    WBC 5.4 11/01/2021 03:36 PM    HGB 12.1 11/01/2021 03:36 PM    HCT 39.6 11/01/2021 03:36 PM    PLATELET 487 91/87/4882 03:36 PM    MCV 87.0 11/01/2021 03:36 PM     Lab Results   Component Value Date/Time    Hemoglobin A1c 5.9 (H) 11/01/2021 03:36 PM    Hemoglobin A1c 5.8 (H) 08/22/2019 12:43 PM    Hemoglobin A1c 5.8 (H) 07/09/2018 02:09 PM    Glucose 91 11/01/2021 03:36 PM    LDL, calculated 117 (H) 11/01/2021 03:36 PM    Creatinine 0.90 11/01/2021 03:36 PM      Lab Results   Component Value Date/Time    Cholesterol, total 199 11/01/2021 03:36 PM    HDL Cholesterol 60 11/01/2021 03:36 PM    LDL, calculated 117 (H) 11/01/2021 03:36 PM    Triglyceride 110 11/01/2021 03:36 PM    CHOL/HDL Ratio 3.3 11/01/2021 03:36 PM     Lab Results   Component Value Date/Time    GFR est non-AA >60 11/01/2021 03:36 PM    GFR est AA >60 11/01/2021 03:36 PM    Creatinine 0.90 11/01/2021 03:36 PM    BUN 18 11/01/2021 03:36 PM    Sodium 141 11/01/2021 03:36 PM    Potassium 3.9 11/01/2021 03:36 PM    Chloride 110 (H) 11/01/2021 03:36 PM    CO2 27 11/01/2021 03:36 PM    Magnesium 2.2 05/08/2015 07:42 AM     No results found for: TSH, TSH2, TSH3, TSHP, TSHELE, TSHEXT, TT3, T3U, T3UP, FRT3, FT3, FT4, FT4P, T4, T4P, FT4T, TT7, TSHEXT   Lab Results   Component Value Date/Time    Glucose 91 11/01/2021 03:36 PM         ROS    Physical Exam  Vitals and nursing note reviewed. Constitutional:       Appearance: Normal appearance. She is well-developed. She is obese. Comments: Appears stated age   Cardiovascular:      Rate and Rhythm: Normal rate and regular rhythm. Heart sounds: Normal heart sounds. No murmur heard. No friction rub. No gallop. Pulmonary:      Effort: Pulmonary effort is normal. No respiratory distress. Breath sounds: Normal breath sounds. No wheezing. Abdominal:      General: Bowel sounds are normal.      Palpations: Abdomen is soft. Neurological:      Mental Status: She is alert. ASSESSMENT and PLAN    ICD-10-CM ICD-9-CM    1.  Hypertension, unspecified type  I10 401.9 CBC W/O DIFF METABOLIC PANEL, BASIC  Moderate elevation  Add cardura 1 mg hs  Fu 1 month      2. Hyperlipidemia, unspecified hyperlipidemia type  E78.5 272.4 LIPID PANEL      TSH 3RD GENERATION      AST      ALT      3. Prediabetes  R73.03 790.29 HEMOGLOBIN A1C WITH EAG      4. Osteopenia, unspecified location  M85.80 733.90       5. Menopause  Z78.0 627.2 DEXA BONE DENSITY STUDY AXIAL      6. Vitamin D deficiency  E55.9 268.9 VITAMIN D, 25 HYDROXY      7. Needs flu shot  Z23 V04.81 INFLUENZA, FLUAD, (AGE 65 Y+), IM, PF, 0.5 ML      This is the Subsequent Medicare Annual Wellness Exam, performed 12 months or more after the Initial AWV or the last Subsequent AWV    I have reviewed the patient's medical history in detail and updated the computerized patient record. Assessment/Plan   Education and counseling provided:  Are appropriate based on today's review and evaluation  End-of-Life planning (with patient's consent)  Influenza Vaccine  Bone mass measurement (DEXA)    1. Hypertension, unspecified type  2. Hyperlipidemia, unspecified hyperlipidemia type  3. Prediabetes  4. Osteopenia, unspecified location  5. Menopause  6. Vitamin D deficiency  7. Needs flu shot  -     INFLUENZA, FLUAD, (AGE 72 Y+), IM, PF, 0.5 ML       Depression Risk Factor Screening     3 most recent PHQ Screens 11/2/2022   PHQ Not Done -   Little interest or pleasure in doing things Several days   Feeling down, depressed, irritable, or hopeless Several days   Total Score PHQ 2 2       Alcohol & Drug Abuse Risk Screen    Do you average more than 1 drink per night or more than 7 drinks a week:  No    On any one occasion in the past three months have you have had more than 3 drinks containing alcohol:  No          Functional Ability and Level of Safety    Hearing: Hearing is good. Activities of Daily Living: The home contains: no safety equipment.   Patient needs help with:  transportation      Ambulation: with no difficulty     Fall Risk:  Fall Risk Assessment, last 12 mths 11/2/2022   Able to walk? Yes   Fall in past 12 months? 0   Do you feel unsteady? 1   Are you worried about falling 0   Is TUG test greater than 12 seconds? 0   Is the gait abnormal? 1   Number of falls in past 12 months 0   Fall with injury? -      Abuse Screen:  Patient is not abused       Cognitive Screening    Has your family/caregiver stated any concerns about your memory: no     Cognitive Screening:      Health Maintenance Due     Health Maintenance Due   Topic Date Due    DTaP/Tdap/Td series (1 - Tdap) Never done    Shingrix Vaccine Age 49> (1 of 2) Never done    COVID-19 Vaccine (4 - Booster for [place] Peter series) 01/08/2022    Medicare Yearly Exam  11/02/2022       Patient Care Team   Patient Care Team:  Neri Gutierrez MD as PCP - General  Neri Gutierrez MD as PCP - [address] [place] [address] Provider  Jerry Bonilla MD (Gastroenterology)  Damaris Gamble MD (Physical Medicine and Rehabilitation Physician)  Dragan Velasquez MD (Inactive) (Orthopedic Surgery)  Jason Licea MD (Ophthalmology)  Dr. De Los Santos Monday (Optometry)  Cristina Hidalgo MD as Physician (Cardiovascular Disease Physician)    History     Patient Active Problem List   Diagnosis Code    Essential hypertension, benign I10    Osteoporosis M81.0    Depression F32. A    DDD (degenerative disc disease), lumbar M51.36    Osteopenia M85.80    BPPV (benign paroxysmal positional vertigo) H81.10    Prediabetes R73.03     Past Medical History:   Diagnosis Date    Arthritis     back    Cataract     Hypertension     Vertigo       Past Surgical History:   Procedure Laterality Date    COLONOSCOPY N/A 2/5/2019    [address] [address]  11/24/2015         COLORECTAL SCRN; HI RISK IND  11/24/2015         COLORECTAL SCRN; HI RISK IND  2/5/2019         HX HYSTERECTOMY      HX OTHER SURGICAL Left 2008    Fatty tumor removed L shoulder    TN COLSC FLX W/RMVL OF TUMOR POLYP LESION SNARE TQ  6/8/2010          Current Outpatient Medications   Medication Sig Dispense Refill    gabapentin (NEURONTIN) 100 mg capsule Take 100 mg by mouth once as needed for Pain.      gabapentin (NEURONTIN) 300 mg capsule Take 300 mg by mouth three (3) times daily as needed. timolol (TIMOPTIC) 0.5 % ophthalmic solution Administer 1 Drop to both eyes daily. cholecalciferol (VITAMIN D3) 1,000 unit tablet Take 1,000 Units by mouth daily. MULTIVITAMINS W-MINERALS/LUT (CENTRUM SILVER PO) Take 1 Tab by mouth daily. CALCIUM CARBONATE (CALTRATE 600 PO) Take 1 Tab by mouth two (2) times a day. amLODIPine (NORVASC) 10 mg tablet Take 1 Tablet by mouth daily. 90 Tablet 3    lisinopril-hydroCHLOROthiazide (PRINZIDE, ZESTORETIC) 20-12.5 mg per tablet 2 qd 180 Tablet 3    metoprolol succinate (TOPROL-XL) 50 mg XL tablet Take 1 Tablet by mouth daily.  80 Tablet 3     No Known Allergies    Family History   Problem Relation Age of Onset    Diabetes Mother     Heart Disease Mother 61    Hypertension Mother     Hypertension Father     Arthritis-rheumatoid Sister     Cancer Sister         stomach    Hypertension Sister     Arthritis-rheumatoid Brother     Hypertension Brother     Stroke Sister     Kidney Disease Brother     Cancer Daughter         colon     Social History     Tobacco Use    Smoking status: Never    Smokeless tobacco: Never   Substance Use Topics    Alcohol use: No         Danis Camacho MD

## 2022-11-02 NOTE — TELEPHONE ENCOUNTER
Future Appointments:  No future appointments. Last Appointment With Me:  11/2/2022     Requested Prescriptions     Pending Prescriptions Disp Refills    amLODIPine (NORVASC) 10 mg tablet 90 Tablet 3     Sig: Take 1 Tablet by mouth daily. lisinopril-hydroCHLOROthiazide (PRINZIDE, ZESTORETIC) 20-12.5 mg per tablet 180 Tablet 3    metoprolol succinate (TOPROL-XL) 50 mg XL tablet 90 Tablet 3     Sig: Take 1 Tablet by mouth daily.

## 2022-11-25 ENCOUNTER — HOSPITAL ENCOUNTER (OUTPATIENT)
Dept: MAMMOGRAPHY | Age: 82
Discharge: HOME | End: 2022-11-25
Attending: INTERNAL MEDICINE

## 2022-11-25 DIAGNOSIS — Z78.0 MENOPAUSE: ICD-10-CM

## 2022-12-02 ENCOUNTER — OFFICE VISIT (OUTPATIENT)
Dept: INTERNAL MEDICINE CLINIC | Age: 82
End: 2022-12-02
Payer: MEDICARE

## 2022-12-02 VITALS
WEIGHT: 169 LBS | BODY MASS INDEX: 31.91 KG/M2 | RESPIRATION RATE: 18 BRPM | TEMPERATURE: 97.1 F | SYSTOLIC BLOOD PRESSURE: 116 MMHG | OXYGEN SATURATION: 95 % | DIASTOLIC BLOOD PRESSURE: 80 MMHG | HEART RATE: 86 BPM | HEIGHT: 61 IN

## 2022-12-02 DIAGNOSIS — E56.9 VITAMIN DEFICIENCY: ICD-10-CM

## 2022-12-02 DIAGNOSIS — I10 HYPERTENSION, UNSPECIFIED TYPE: Primary | ICD-10-CM

## 2022-12-02 RX ORDER — MELATONIN
2000 DAILY
Qty: 60 TABLET | Refills: 0
Start: 2022-12-02

## 2022-12-02 RX ORDER — DOXAZOSIN 1 MG/1
1 TABLET ORAL DAILY
Qty: 90 TABLET | Refills: 3 | Status: SHIPPED | OUTPATIENT
Start: 2022-12-02

## 2022-12-02 NOTE — PROGRESS NOTES
1. Have you been to the ER, urgent care clinic since your last visit? Hospitalized since your last visit? No    2. Have you seen or consulted any other health care providers outside of the [address] [place] [address] since your last visit? Include any pap smears or colon screening.  No

## 2022-12-02 NOTE — PROGRESS NOTES
HISTORY OF PRESENT ILLNESS  Dwayne Leary is a 80 y.o. female. HPI  hx HTN HLD prediabetes osteopenia of hip and spine , left sciatica   Cardura was added last month for bp 160/98-tolerating well-no dizziness    Last OV  Reports med adherence  No recent home bp readings  No chest pain  Has intermittent sciatica-takes neurontin prn       Patient Active Problem List    Diagnosis Date Noted    Prediabetes 08/30/2016    BPPV (benign paroxysmal positional vertigo) 03/19/2016    Osteopenia 01/12/2016    DDD (degenerative disc disease), lumbar 02/28/2014    Essential hypertension, benign 02/21/2010    Osteoporosis 02/21/2010    Depression 02/21/2010     Current Outpatient Medications   Medication Sig Dispense Refill    doxazosin (CARDURA) 1 mg tablet Take 1 Tablet by mouth daily. 90 Tablet 3    cholecalciferol (VITAMIN D3) (1000 Units /25 mcg) tablet Take 2 Tablets by mouth daily. 60 Tablet 0    amLODIPine (NORVASC) 10 mg tablet Take 1 Tablet by mouth daily. 90 Tablet 3    lisinopril-hydroCHLOROthiazide (PRINZIDE, ZESTORETIC) 20-12.5 mg per tablet 2 qd 180 Tablet 3    metoprolol succinate (TOPROL-XL) 50 mg XL tablet Take 1 Tablet by mouth daily. 90 Tablet 3    gabapentin (NEURONTIN) 100 mg capsule Take 100 mg by mouth once as needed for Pain.      gabapentin (NEURONTIN) 300 mg capsule Take 300 mg by mouth three (3) times daily as needed. timolol (TIMOPTIC) 0.5 % ophthalmic solution Administer 1 Drop to both eyes daily. MULTIVITAMINS W-MINERALS/LUT (CENTRUM SILVER PO) Take 1 Tab by mouth daily. CALCIUM CARBONATE (CALTRATE 600 PO) Take 1 Tab by mouth two (2) times a day.        No Known Allergies   Lab Results   Component Value Date/Time    WBC 5.5 11/02/2022 09:35 AM    HGB 12.7 11/02/2022 09:35 AM    HCT 41.2 11/02/2022 09:35 AM    PLATELET 896 11/71/9536 09:35 AM    MCV 89.0 11/02/2022 09:35 AM     Lab Results   Component Value Date/Time    GFR est non-AA >60 11/01/2021 03:36 PM    GFR est AA >60 11/01/2021 03:36 PM    Creatinine 0.80 11/02/2022 09:35 AM    BUN 15 11/02/2022 09:35 AM    Sodium 140 11/02/2022 09:35 AM    Potassium 4.0 11/02/2022 09:35 AM    Chloride 108 11/02/2022 09:35 AM    CO2 29 11/02/2022 09:35 AM    Magnesium 2.2 05/08/2015 07:42 AM        ROS    Physical Exam  Vitals and nursing note reviewed. Constitutional:       Appearance: Normal appearance. She is well-developed. Comments: Appears stated age   Cardiovascular:      Rate and Rhythm: Normal rate and regular rhythm. Heart sounds: Normal heart sounds. No murmur heard. No friction rub. No gallop. Pulmonary:      Effort: Pulmonary effort is normal. No respiratory distress. Breath sounds: Normal breath sounds. No wheezing. Abdominal:      General: Bowel sounds are normal.      Palpations: Abdomen is soft. Neurological:      Mental Status: She is alert. ASSESSMENT and PLAN    ICD-10-CM ICD-9-CM    1. Hypertension, unspecified type  I10 401.9 Controlled on current medicines-refill cardura      2.  Vitamin deficiency  E56.9 269.2 On vit d 2000 iu every day  Pt will get DEXA next month   Fu as scheduled

## 2023-01-13 ENCOUNTER — HOSPITAL ENCOUNTER (OUTPATIENT)
Dept: MAMMOGRAPHY | Age: 83
Discharge: HOME | End: 2023-01-13
Attending: INTERNAL MEDICINE
Payer: MEDICARE

## 2023-01-13 PROCEDURE — 77080 DXA BONE DENSITY AXIAL: CPT

## 2023-02-10 ENCOUNTER — APPOINTMENT (OUTPATIENT)
Dept: CT IMAGING | Age: 83
DRG: 066 | End: 2023-02-10
Attending: EMERGENCY MEDICINE
Payer: MEDICARE

## 2023-02-10 ENCOUNTER — APPOINTMENT (OUTPATIENT)
Dept: MRI IMAGING | Age: 83
DRG: 066 | End: 2023-02-10
Attending: INTERNAL MEDICINE
Payer: MEDICARE

## 2023-02-10 ENCOUNTER — HOSPITAL ENCOUNTER (INPATIENT)
Age: 83
LOS: 3 days | Discharge: HOME | DRG: 066 | End: 2023-02-14
Attending: EMERGENCY MEDICINE | Admitting: INTERNAL MEDICINE
Payer: MEDICARE

## 2023-02-10 DIAGNOSIS — R29.898 RIGHT ARM WEAKNESS: Primary | ICD-10-CM

## 2023-02-10 PROBLEM — I63.312 THROMBOTIC STROKE INVOLVING LEFT MIDDLE CEREBRAL ARTERY (HCC): Status: ACTIVE | Noted: 2023-02-10

## 2023-02-10 PROBLEM — G45.9 TIA (TRANSIENT ISCHEMIC ATTACK): Status: ACTIVE | Noted: 2023-02-10

## 2023-02-10 PROBLEM — I65.23 BILATERAL CAROTID ARTERY STENOSIS: Status: ACTIVE | Noted: 2023-02-10

## 2023-02-10 LAB
ALBUMIN SERPL-MCNC: 3.5 G/DL (ref 3.5–5)
ALBUMIN/GLOB SERPL: 0.9 (ref 1.1–2.2)
ALP SERPL-CCNC: 61 U/L (ref 45–117)
ALT SERPL-CCNC: 14 U/L (ref 12–78)
ANION GAP SERPL CALC-SCNC: 5 MMOL/L (ref 5–15)
APTT PPP: 23.7 SEC (ref 22.1–31)
AST SERPL-CCNC: 10 U/L (ref 15–37)
ATRIAL RATE: 73 BPM
BASOPHILS # BLD: 0 K/UL (ref 0–0.1)
BASOPHILS NFR BLD: 1 % (ref 0–1)
BILIRUB SERPL-MCNC: 0.3 MG/DL (ref 0.2–1)
BUN SERPL-MCNC: 17 MG/DL (ref 6–20)
BUN/CREAT SERPL: 17 (ref 12–20)
CALCIUM SERPL-MCNC: 9.3 MG/DL (ref 8.5–10.1)
CALCULATED P AXIS, ECG09: 63 DEGREES
CALCULATED R AXIS, ECG10: -4 DEGREES
CALCULATED T AXIS, ECG11: 43 DEGREES
CHLORIDE SERPL-SCNC: 104 MMOL/L (ref 97–108)
CO2 SERPL-SCNC: 31 MMOL/L (ref 21–32)
CREAT SERPL-MCNC: 0.98 MG/DL (ref 0.55–1.02)
DIAGNOSIS, 93000: NORMAL
DIFFERENTIAL METHOD BLD: NORMAL
EOSINOPHIL # BLD: 0.1 K/UL (ref 0–0.4)
EOSINOPHIL NFR BLD: 2 % (ref 0–7)
ERYTHROCYTE [DISTWIDTH] IN BLOOD BY AUTOMATED COUNT: 14.2 % (ref 11.5–14.5)
GLOBULIN SER CALC-MCNC: 4.1 G/DL (ref 2–4)
GLUCOSE BLD STRIP.AUTO-MCNC: 93 MG/DL (ref 65–117)
GLUCOSE SERPL-MCNC: 100 MG/DL (ref 65–100)
HCT VFR BLD AUTO: 38.7 % (ref 35–47)
HGB BLD-MCNC: 12.1 G/DL (ref 11.5–16)
IMM GRANULOCYTES # BLD AUTO: 0 K/UL (ref 0–0.04)
IMM GRANULOCYTES NFR BLD AUTO: 0 % (ref 0–0.5)
INR PPP: 1 (ref 0.9–1.1)
LYMPHOCYTES # BLD: 1.9 K/UL (ref 0.8–3.5)
LYMPHOCYTES NFR BLD: 37 % (ref 12–49)
MCH RBC QN AUTO: 26.7 PG (ref 26–34)
MCHC RBC AUTO-ENTMCNC: 31.3 G/DL (ref 30–36.5)
MCV RBC AUTO: 85.2 FL (ref 80–99)
MONOCYTES # BLD: 0.6 K/UL (ref 0–1)
MONOCYTES NFR BLD: 11 % (ref 5–13)
NEUTS SEG # BLD: 2.6 K/UL (ref 1.8–8)
NEUTS SEG NFR BLD: 49 % (ref 32–75)
NRBC # BLD: 0 K/UL (ref 0–0.01)
NRBC BLD-RTO: 0 PER 100 WBC
P-R INTERVAL, ECG05: 160 MS
PLATELET # BLD AUTO: 228 K/UL (ref 150–400)
PMV BLD AUTO: 10.9 FL (ref 8.9–12.9)
POTASSIUM SERPL-SCNC: 3.4 MMOL/L (ref 3.5–5.1)
PROT SERPL-MCNC: 7.6 G/DL (ref 6.4–8.2)
PROTHROMBIN TIME: 10.3 SEC (ref 9–11.1)
Q-T INTERVAL, ECG07: 376 MS
QRS DURATION, ECG06: 88 MS
QTC CALCULATION (BEZET), ECG08: 414 MS
RBC # BLD AUTO: 4.54 M/UL (ref 3.8–5.2)
SERVICE CMNT-IMP: NORMAL
SODIUM SERPL-SCNC: 140 MMOL/L (ref 136–145)
THERAPEUTIC RANGE,PTTT: NORMAL SECS (ref 58–77)
TROPONIN I SERPL HS-MCNC: 5 NG/L (ref 0–51)
VENTRICULAR RATE, ECG03: 73 BPM
WBC # BLD AUTO: 5.2 K/UL (ref 3.6–11)

## 2023-02-10 PROCEDURE — 94762 N-INVAS EAR/PLS OXIMTRY CONT: CPT

## 2023-02-10 PROCEDURE — 36415 COLL VENOUS BLD VENIPUNCTURE: CPT

## 2023-02-10 PROCEDURE — 99223 1ST HOSP IP/OBS HIGH 75: CPT | Performed by: PSYCHIATRY & NEUROLOGY

## 2023-02-10 PROCEDURE — 4A03X5D MEASUREMENT OF ARTERIAL FLOW, INTRACRANIAL, EXTERNAL APPROACH: ICD-10-PCS | Performed by: STUDENT IN AN ORGANIZED HEALTH CARE EDUCATION/TRAINING PROGRAM

## 2023-02-10 PROCEDURE — 70496 CT ANGIOGRAPHY HEAD: CPT

## 2023-02-10 PROCEDURE — 0042T CT PERF W CBF: CPT

## 2023-02-10 PROCEDURE — 72125 CT NECK SPINE W/O DYE: CPT

## 2023-02-10 PROCEDURE — 85610 PROTHROMBIN TIME: CPT

## 2023-02-10 PROCEDURE — 96372 THER/PROPH/DIAG INJ SC/IM: CPT

## 2023-02-10 PROCEDURE — 85730 THROMBOPLASTIN TIME PARTIAL: CPT

## 2023-02-10 PROCEDURE — 85025 COMPLETE CBC W/AUTO DIFF WBC: CPT

## 2023-02-10 PROCEDURE — 82962 GLUCOSE BLOOD TEST: CPT

## 2023-02-10 PROCEDURE — 70551 MRI BRAIN STEM W/O DYE: CPT

## 2023-02-10 PROCEDURE — 70450 CT HEAD/BRAIN W/O DYE: CPT

## 2023-02-10 PROCEDURE — G0378 HOSPITAL OBSERVATION PER HR: HCPCS

## 2023-02-10 PROCEDURE — 84484 ASSAY OF TROPONIN QUANT: CPT

## 2023-02-10 PROCEDURE — 80053 COMPREHEN METABOLIC PANEL: CPT

## 2023-02-10 PROCEDURE — 74011250636 HC RX REV CODE- 250/636: Performed by: INTERNAL MEDICINE

## 2023-02-10 PROCEDURE — 74011250637 HC RX REV CODE- 250/637: Performed by: INTERNAL MEDICINE

## 2023-02-10 PROCEDURE — 99285 EMERGENCY DEPT VISIT HI MDM: CPT

## 2023-02-10 RX ORDER — ACETAMINOPHEN 325 MG/1
650 TABLET ORAL
Status: DISCONTINUED | OUTPATIENT
Start: 2023-02-10 | End: 2023-02-14 | Stop reason: HOSPADM

## 2023-02-10 RX ORDER — HYDRALAZINE HYDROCHLORIDE 20 MG/ML
10 INJECTION INTRAMUSCULAR; INTRAVENOUS
Status: DISCONTINUED | OUTPATIENT
Start: 2023-02-10 | End: 2023-02-14 | Stop reason: HOSPADM

## 2023-02-10 RX ORDER — GUAIFENESIN 100 MG/5ML
100 SOLUTION ORAL
Status: DISCONTINUED | OUTPATIENT
Start: 2023-02-10 | End: 2023-02-14 | Stop reason: HOSPADM

## 2023-02-10 RX ORDER — CLOPIDOGREL BISULFATE 75 MG/1
75 TABLET ORAL DAILY
Status: DISCONTINUED | OUTPATIENT
Start: 2023-02-11 | End: 2023-02-14 | Stop reason: HOSPADM

## 2023-02-10 RX ORDER — MELATONIN
2000 DAILY
Status: DISCONTINUED | OUTPATIENT
Start: 2023-02-11 | End: 2023-02-14 | Stop reason: HOSPADM

## 2023-02-10 RX ORDER — LANOLIN ALCOHOL/MO/W.PET/CERES
3 CREAM (GRAM) TOPICAL
Status: DISCONTINUED | OUTPATIENT
Start: 2023-02-10 | End: 2023-02-14 | Stop reason: HOSPADM

## 2023-02-10 RX ORDER — METOPROLOL SUCCINATE 50 MG/1
50 TABLET, EXTENDED RELEASE ORAL DAILY
Status: DISCONTINUED | OUTPATIENT
Start: 2023-02-11 | End: 2023-02-13

## 2023-02-10 RX ORDER — ATORVASTATIN CALCIUM 40 MG/1
40 TABLET, FILM COATED ORAL
Status: DISCONTINUED | OUTPATIENT
Start: 2023-02-10 | End: 2023-02-14 | Stop reason: HOSPADM

## 2023-02-10 RX ORDER — ALBUTEROL SULFATE 2.5 MG/.5ML
1.25 SOLUTION RESPIRATORY (INHALATION)
Status: DISCONTINUED | OUTPATIENT
Start: 2023-02-10 | End: 2023-02-14 | Stop reason: HOSPADM

## 2023-02-10 RX ORDER — POTASSIUM CHLORIDE 20 MEQ/1
60 TABLET, EXTENDED RELEASE ORAL
Status: COMPLETED | OUTPATIENT
Start: 2023-02-10 | End: 2023-02-10

## 2023-02-10 RX ORDER — GABAPENTIN 300 MG/1
300 CAPSULE ORAL 3 TIMES DAILY
Status: DISCONTINUED | OUTPATIENT
Start: 2023-02-10 | End: 2023-02-14 | Stop reason: HOSPADM

## 2023-02-10 RX ORDER — CALCIUM CARBONATE 500(1250)
500 TABLET ORAL 2 TIMES DAILY
Status: DISCONTINUED | OUTPATIENT
Start: 2023-02-10 | End: 2023-02-14 | Stop reason: HOSPADM

## 2023-02-10 RX ORDER — GUAIFENESIN 100 MG/5ML
81 LIQUID (ML) ORAL DAILY
Status: DISCONTINUED | OUTPATIENT
Start: 2023-02-11 | End: 2023-02-14 | Stop reason: HOSPADM

## 2023-02-10 RX ORDER — ACETAMINOPHEN 325 MG/1
650 TABLET ORAL
Status: DISCONTINUED | OUTPATIENT
Start: 2023-02-10 | End: 2023-02-10 | Stop reason: SDUPTHER

## 2023-02-10 RX ORDER — TIMOLOL MALEATE 5 MG/ML
1 SOLUTION/ DROPS OPHTHALMIC DAILY
Status: DISCONTINUED | OUTPATIENT
Start: 2023-02-11 | End: 2023-02-14 | Stop reason: HOSPADM

## 2023-02-10 RX ORDER — HEPARIN SODIUM 5000 [USP'U]/ML
5000 INJECTION, SOLUTION INTRAVENOUS; SUBCUTANEOUS EVERY 8 HOURS
Status: DISCONTINUED | OUTPATIENT
Start: 2023-02-10 | End: 2023-02-14 | Stop reason: HOSPADM

## 2023-02-10 RX ORDER — MORPHINE SULFATE 2 MG/ML
2 INJECTION, SOLUTION INTRAMUSCULAR; INTRAVENOUS
Status: DISCONTINUED | OUTPATIENT
Start: 2023-02-10 | End: 2023-02-14 | Stop reason: HOSPADM

## 2023-02-10 RX ORDER — DOXAZOSIN 2 MG/1
1 TABLET ORAL DAILY
Status: DISCONTINUED | OUTPATIENT
Start: 2023-02-11 | End: 2023-02-14 | Stop reason: HOSPADM

## 2023-02-10 RX ORDER — ACETAMINOPHEN 650 MG/1
650 SUPPOSITORY RECTAL
Status: DISCONTINUED | OUTPATIENT
Start: 2023-02-10 | End: 2023-02-14 | Stop reason: HOSPADM

## 2023-02-10 RX ORDER — AMLODIPINE BESYLATE 5 MG/1
10 TABLET ORAL DAILY
Status: DISCONTINUED | OUTPATIENT
Start: 2023-02-11 | End: 2023-02-13

## 2023-02-10 RX ADMIN — POTASSIUM CHLORIDE 60 MEQ: 1500 TABLET, EXTENDED RELEASE ORAL at 15:37

## 2023-02-10 RX ADMIN — CALCIUM 500 MG: 500 TABLET ORAL at 21:19

## 2023-02-10 RX ADMIN — GABAPENTIN 300 MG: 300 CAPSULE ORAL at 21:18

## 2023-02-10 RX ADMIN — ATORVASTATIN CALCIUM 40 MG: 40 TABLET, FILM COATED ORAL at 21:19

## 2023-02-10 RX ADMIN — GABAPENTIN 300 MG: 300 CAPSULE ORAL at 15:37

## 2023-02-10 RX ADMIN — HEPARIN SODIUM 5000 UNITS: 5000 INJECTION INTRAVENOUS; SUBCUTANEOUS at 21:19

## 2023-02-10 NOTE — H&P
GENERAL GENERIC H&P/CONSULT  Presenting complaint:Right arm weakness/numbness/tingling    Subjective: 80-year-old female with past medical history of multiple comorbidities presents to [facility] with complaints of right arm weakness/numbness/tingling. Patient states she was in her usual state of health until 3 days back when she start experiencing sudden onset persistent right arm weakness/numbness/tingling as well as a difficulty in using her hand, as there was no improvement in her symptomatology patient presented to the ED. Patient denies any fevers chills nausea vomiting lightheadedness dizziness dyspnea orthopnea paroxysmal nocturnal dyspnea chest pain palpitations headache auditory or visual symptoms abdominal stool or urinary complaints or any other associated symptoms. Patient endorses no recent sick contacts or travel activity    Past Medical History:   Diagnosis Date    Arthritis     back    Cataract     Hypertension     Vertigo       Past Surgical History:   Procedure Laterality Date    COLONOSCOPY N/A 2/5/2019    COLONOSCOPY performed by Sindy Pringle MD at [address]  11/24/2015         COLORECTAL SCRN; HI RISK IND  11/24/2015         COLORECTAL SCRN; HI RISK IND  2/5/2019         HX HYSTERECTOMY      HX OTHER SURGICAL Left 2008    Fatty tumor removed L shoulder    MS COLSC FLX W/RMVL OF TUMOR POLYP LESION SNARE TQ  6/8/2010           Prior to Admission medications    Medication Sig Start Date End Date Taking? Authorizing Provider   doxazosin (CARDURA) 1 mg tablet Take 1 Tablet by mouth daily. 12/2/22   Angie Mccray MD   cholecalciferol (VITAMIN D3) (1000 Units /25 mcg) tablet Take 2 Tablets by mouth daily. 12/2/22   Angie Mccray MD   amLODIPine (NORVASC) 10 mg tablet Take 1 Tablet by mouth daily.  11/2/22   Angie Mccray MD   lisinopril-hydroCHLOROthiazide Theodora Choi ZESTORETIC) 20-12.5 mg per tablet 2 qd 11/2/22   Daryl Carver MD LALY   metoprolol succinate (TOPROL-XL) 50 mg XL tablet Take 1 Tablet by mouth daily. 11/2/22   Phoebe Dumont MD   gabapentin (NEURONTIN) 100 mg capsule Take 100 mg by mouth once as needed for Pain. Provider, Historical   gabapentin (NEURONTIN) 300 mg capsule Take 300 mg by mouth three (3) times daily as needed. Provider, Historical   timolol (TIMOPTIC) 0.5 % ophthalmic solution Administer 1 Drop to both eyes daily. 8/16/16   Provider, Historical   MULTIVITAMINS W-MINERALS/LUT (CENTRUM SILVER PO) Take 1 Tab by mouth daily. Provider, Historical   CALCIUM CARBONATE (CALTRATE 600 PO) Take 1 Tab by mouth two (2) times a day. Provider, Historical     No Known Allergies   Social History     Tobacco Use    Smoking status: Never    Smokeless tobacco: Never   Substance Use Topics    Alcohol use: No      Family History   Problem Relation Age of Onset    Diabetes Mother     Heart Disease Mother 61    Hypertension Mother     Hypertension Father     Arthritis-rheumatoid Sister     Cancer Sister         stomach    Hypertension Sister     Arthritis-rheumatoid Brother     Hypertension Brother     Stroke Sister     Kidney Disease Brother     Cancer Daughter         colon      Review of Systems   Constitutional:  Positive for activity change, appetite change and fatigue. Negative for chills, diaphoresis, fever and unexpected weight change. HENT:  Negative for congestion, dental problem, drooling, ear discharge, ear pain, facial swelling, hearing loss, mouth sores, nosebleeds, postnasal drip, rhinorrhea, sinus pressure, sinus pain, sneezing, sore throat, tinnitus, trouble swallowing and voice change. Eyes:  Negative for photophobia, pain, discharge, redness, itching and visual disturbance. Respiratory:  Negative for apnea, cough, choking, chest tightness, shortness of breath, wheezing and stridor. Cardiovascular:  Negative for chest pain, palpitations and leg swelling.    Gastrointestinal:  Negative for abdominal distention, abdominal pain, anal bleeding, blood in stool, constipation, diarrhea, nausea, rectal pain and vomiting. Endocrine: Negative for cold intolerance, heat intolerance, polydipsia, polyphagia and polyuria. Genitourinary:  Negative for decreased urine volume, difficulty urinating, dyspareunia, dysuria, enuresis, flank pain, frequency, genital sores, hematuria, menstrual problem, pelvic pain, urgency, vaginal bleeding, vaginal discharge and vaginal pain. Musculoskeletal:  Negative for arthralgias, back pain, gait problem, joint swelling, myalgias, neck pain and neck stiffness. Skin:  Negative for color change, pallor, rash and wound. Allergic/Immunologic: Negative for environmental allergies, food allergies and immunocompromised state. Neurological:  Positive for weakness. Negative for dizziness, tremors, seizures, syncope, facial asymmetry, speech difficulty, light-headedness, numbness and headaches. Hematological:  Negative for adenopathy. Does not bruise/bleed easily. Psychiatric/Behavioral:  Negative for agitation, behavioral problems, confusion, decreased concentration, dysphoric mood, hallucinations, self-injury, sleep disturbance and suicidal ideas. The patient is not nervous/anxious and is not hyperactive. Objective:    No intake/output data recorded. No intake/output data recorded. Patient Vitals for the past 8 hrs:   BP Temp Pulse Resp SpO2 Height Weight   02/10/23 1527 -- -- 62 -- 97 % -- --   02/10/23 1051 (!) 146/100 98.6 °F (37 °C) 85 14 100 % 5' 2\" (1.575 m) 75.8 kg (167 lb 1.7 oz)     Physical Exam  Vitals reviewed. Constitutional:       General: She is not in acute distress. Appearance: Normal appearance. She is normal weight. She is not ill-appearing, toxic-appearing or diaphoretic. HENT:      Head: Normocephalic and atraumatic. Nose: Nose normal. No congestion or rhinorrhea.       Mouth/Throat:      Mouth: Mucous membranes are moist.      Pharynx: Oropharynx is clear. No oropharyngeal exudate or posterior oropharyngeal erythema. Eyes:      General: No scleral icterus. Right eye: No discharge. Left eye: No discharge. Extraocular Movements: Extraocular movements intact. Conjunctiva/sclera: Conjunctivae normal.      Pupils: Pupils are equal, round, and reactive to light. Neck:      Vascular: No carotid bruit. Cardiovascular:      Rate and Rhythm: Normal rate and regular rhythm. Pulses: Normal pulses. Heart sounds: Normal heart sounds. No murmur heard. No friction rub. No gallop. Pulmonary:      Effort: Pulmonary effort is normal. No respiratory distress. Breath sounds: Normal breath sounds. No stridor. No wheezing, rhonchi or rales. Chest:      Chest wall: No tenderness. Abdominal:      General: Abdomen is flat. Bowel sounds are normal. There is no distension. Palpations: Abdomen is soft. There is no mass. Tenderness: There is no abdominal tenderness. There is no right CVA tenderness, left CVA tenderness, guarding or rebound. Hernia: No hernia is present. Musculoskeletal:         General: No swelling, tenderness, deformity or signs of injury. Normal range of motion. Cervical back: Normal range of motion and neck supple. No rigidity or tenderness. Right lower leg: No edema. Left lower leg: No edema. Lymphadenopathy:      Cervical: No cervical adenopathy. Skin:     General: Skin is warm. Capillary Refill: Capillary refill takes less than 2 seconds. Coloration: Skin is not jaundiced or pale. Findings: No bruising, erythema, lesion or rash. Neurological:      General: No focal deficit present. Mental Status: She is alert and oriented to person, place, and time. Mental status is at baseline. Cranial Nerves: No cranial nerve deficit. Sensory: No sensory deficit. Motor: No weakness.       Coordination: Coordination normal.      Gait: Gait normal.      Deep Tendon Reflexes: Reflexes normal.   Psychiatric:         Mood and Affect: Mood normal.         Behavior: Behavior normal.         Thought Content: Thought content normal.         Judgment: Judgment normal.        Labs:    Recent Results (from the past 24 hour(s))   GLUCOSE, POC    Collection Time: 02/10/23 10:54 AM   Result Value Ref Range    Glucose (POC) 93 65 - 117 mg/dL    Performed by Genene Jewel \"Myra\"    EKG, 12 LEAD, INITIAL    Collection Time: 02/10/23 10:57 AM   Result Value Ref Range    Ventricular Rate 73 BPM    Atrial Rate 73 BPM    P-R Interval 160 ms    QRS Duration 88 ms    Q-T Interval 376 ms    QTC Calculation (Bezet) 414 ms    Calculated P Axis 63 degrees    Calculated R Axis -4 degrees    Calculated T Axis 43 degrees    Diagnosis       ** Poor data quality, interpretation may be adversely affected  Normal sinus rhythm  Normal ECG  When compared with ECG of 18-MAR-2016 02:29,  No significant change was found     CBC WITH AUTOMATED DIFF    Collection Time: 02/10/23 11:04 AM   Result Value Ref Range    WBC 5.2 3.6 - 11.0 K/uL    RBC 4.54 3.80 - 5.20 M/uL    HGB 12.1 11.5 - 16.0 g/dL    HCT 38.7 35.0 - 47.0 %    MCV 85.2 80.0 - 99.0 FL    MCH 26.7 26.0 - 34.0 PG    MCHC 31.3 30.0 - 36.5 g/dL    RDW 14.2 11.5 - 14.5 %    PLATELET 723 825 - 196 K/uL    MPV 10.9 8.9 - 12.9 FL    NRBC 0.0 0  WBC    ABSOLUTE NRBC 0.00 0.00 - 0.01 K/uL    NEUTROPHILS 49 32 - 75 %    LYMPHOCYTES 37 12 - 49 %    MONOCYTES 11 5 - 13 %    EOSINOPHILS 2 0 - 7 %    BASOPHILS 1 0 - 1 %    IMMATURE GRANULOCYTES 0 0.0 - 0.5 %    ABS. NEUTROPHILS 2.6 1.8 - 8.0 K/UL    ABS. LYMPHOCYTES 1.9 0.8 - 3.5 K/UL    ABS. MONOCYTES 0.6 0.0 - 1.0 K/UL    ABS. EOSINOPHILS 0.1 0.0 - 0.4 K/UL    ABS. BASOPHILS 0.0 0.0 - 0.1 K/UL    ABS. IMM.  GRANS. 0.0 0.00 - 0.04 K/UL    DF AUTOMATED     METABOLIC PANEL, COMPREHENSIVE    Collection Time: 02/10/23 11:04 AM   Result Value Ref Range    Sodium 140 136 - 145 mmol/L Potassium 3.4 (L) 3.5 - 5.1 mmol/L    Chloride 104 97 - 108 mmol/L    CO2 31 21 - 32 mmol/L    Anion gap 5 5 - 15 mmol/L    Glucose 100 65 - 100 mg/dL    BUN 17 6 - 20 MG/DL    Creatinine 0.98 0.55 - 1.02 MG/DL    BUN/Creatinine ratio 17 12 - 20      eGFR 58 (L) >60 ml/min/1.73m2    Calcium 9.3 8.5 - 10.1 MG/DL    Bilirubin, total 0.3 0.2 - 1.0 MG/DL    ALT (SGPT) 14 12 - 78 U/L    AST (SGOT) 10 (L) 15 - 37 U/L    Alk. phosphatase 61 45 - 117 U/L    Protein, total 7.6 6.4 - 8.2 g/dL    Albumin 3.5 3.5 - 5.0 g/dL    Globulin 4.1 (H) 2.0 - 4.0 g/dL    A-G Ratio 0.9 (L) 1.1 - 2.2     PROTHROMBIN TIME + INR    Collection Time: 02/10/23 11:04 AM   Result Value Ref Range    INR 1.0 0.9 - 1.1      Prothrombin time 10.3 9.0 - 11.1 sec   PTT    Collection Time: 02/10/23 11:04 AM   Result Value Ref Range    aPTT 23.7 22.1 - 31.0 sec    aPTT, therapeutic range     58.0 - 77.0 SECS   TROPONIN-HIGH SENSITIVITY    Collection Time: 02/10/23 11:04 AM   Result Value Ref Range    Troponin-High Sensitivity 5 0 - 51 ng/L       ECG: nonspecific ST and T waves changes     CT head:IMPRESSION  No acute infarct or other acute findings. Nonspecific chronic white  matter changes most likely reflective of chronic small vessel ischemic change. Intracranial atherosclerosis. CTA head and neck:IMPRESSION  1. No large vessel occlusion or hemodynamically significant stenosis. No  perfusion abnormality. 2.  Partially visualized pulmonary nodules in the right upper lobe measuring at  least 5 mm. Recommend chest CT for further workup.     Assessment:  Active Problems:    TIA (transient ischemic attack) (2/10/2023)        Plan:    Transient ischemic attack-patient presents with above-mentioned symptomatology based on patient's clinical presentation concerns for transient ischemic attack versus febrile vascular accident versus impinged nerve, patient joshua hemodynamically stable, CT head negative  Obtain MRI brain  Aspirin once daily  Lipitor once daily  CT angio head and neck reviewed  Obtain limited transthoracic echo to rule out PFO/thrombus  Aspiration seizure precautions  Occupational therapy physical therapy evaluation  Neurology consult further evaluation    Hypertension-reinitiate home antihypertensive medications    Vitamin D deficiency-continue home medications    History of glaucoma-continue medications    Hypokalemia-replete potassium and recheck potassium    Prophylaxis-heparin subcu  FEN-cardiac diet, replete potassium and magnesium  Full code, will clarify about surrogate decision-maker, admitted to telemetry for observation    Signed:   Flynn Nissen, MD 2/10/2023

## 2023-02-10 NOTE — ED NOTES
ADMISSION SBAR NOTE    IP UNIT CALLED NOTE IS READY: Yes Spoke to sarath  IF there are questions Call Ny Higginbotham at phone # [phone]    SITUATION/BACKGROUND:    Patient is being transferred to [address], Room# 127    Patient's Chief Complaint was right hand/ wrist weakness and is admitted for right arm weakness. CODE STATUS: Full Code    ISOLATION/PRECAUTIONS: Yes   ISOLATION TYPE: n/a    Called outstanding consults: Yes     Are there still sign and held orders that need to be released? No     STAT labs collected: No   REPEAT LACTIC ACID DUE? n/a TIME DUE: n/a    All STAT orders are complete: Yes    The following personal items will be sent with the patient during transfer to the floor: All valuables:   ITEM:    ITEM: Visual Aid: Glasses  ITEM:    ITEM:    ITEM:         ASSESSMENT:    CIWA Assessment: No  Last Score: n/a    NEURO:     NIH SCORE: Total: 0 (02/10/23 1512)  INDIRA SCREENING: Swallow Screening  Is the Patient Unable to Remain Alert for Testing?: No (02/10/23 1507)  Is the Patient on a Modified Diet (Thickened Liquids) Due to Pre-existing Dysphagia?: No (02/10/23 1507)  Is There Presence of Existing Enteral Tube Feeding via the Stomach or Nose?: No (02/10/23 1507)  Is There Presence of Head-of-Bed Restrictions (Less than 30 Degrees)?: No (02/10/23 1507)  Is There Presence of Tracheotomy Tube?: No (02/10/23 1507)  Is the Patient Ordered Nothing-by-Mouth Status?: No (02/10/23 1507)  3 oz Water Swallow Screen: Pass (02/10/23 1507)    NEURO ASSESSMENT: Neuro  Neurologic State: Alert (02/10/23 1512)  Orientation Level: Oriented X4 (02/10/23 1512)  Cognition: Appropriate decision making (02/10/23 1512)  Speech: Appropriate for age (02/10/23 1512)    Is patient impulsive? No   Is patient oriented? Yes   Do they follow commands? Yes  Is the patient ambulatory? Yes Device need n/a    FALL RISK? Yes   INTERVENTIONS: stand by assist    RESPIRATORY: Is patient on Oxygen?  No    OXYGEN: Oxygen Therapy  O2 Device: None (Room air) (02/10/23 1051)    CARDIAC: Is cardiac monitoring ordered? Yes Last Rhythm: NSR  Patient to transfer with tele box on? Yes  Is patient using a LIFE VEST? No     LINE ACCESS:       /GI: CONTINENT BOWEL/BLADDER? Yes  URINARY OUTPUT: voiding  CHRONIC OR ACUTE? n/a   If CHRONIC, is it 1days old, was it changed prior to specimen collection? No   WAS UA WITH REFLEX SENT TO LAB? Yes IF NO, COLLECT AND SEND PRIOR TO TRANSPORT TO INPATIENT AREA    INTEGUMENTARY:   IS THE PATIENT UNDRESSED? No   ARE THERE WOUNDS PRESENT? No   ARE THE WOUNDS DOCUMENTED?  No     RESTRAINTS IN USE: No      IS DOCUMENTATION COMPLETE: n/a  Is there a current Order? n/a When does it ? n/a    Vital Signs  Level of Consciousness: Alert (0) (02/10/23 1051)  Temp: 98.6 °F (37 °C) (02/10/23 1051)  Temp Source: Oral (02/10/23 1051)  Pulse (Heart Rate): 62 (02/10/23 1527)  Resp Rate: 14 (02/10/23 1051)  BP: (!) 146/100 (02/10/23 1051)  MAP (Monitor): 115 (02/10/23 1051)  MAP (Calculated): 115 (02/10/23 1051)  BP Patient Position: Sitting (02/10/23 1051)  MEWS Score: 0 (02/10/23 1051)  Pain 1  Pain Scale 1: Numeric (0 - 10) (02/10/23 1051)  Pain Intensity 1: 3 (02/10/23 1051)      REVIEW:

## 2023-02-10 NOTE — ED PROVIDER NOTES
[facility] EMERGENCY DEPT  EMERGENCY DEPARTMENT ENCOUNTER       Pt Name: Brad Ortiz  MRN: [id number]  [place] 1940  Date of evaluation: 2/10/2023  Provider: Anthony Arroyo MD   PCP: Erasmo Schuler MD  Note Started: 3:13 PM 2/10/23     CHIEF COMPLAINT       Chief Complaint   Patient presents with    Extremity Weakness     Pt noticed three days ago when she woke up unable to use the right arm or right hand. Woke up with it feeling like it was asleep. Pt takes a 81mg aspirin daily. Denies h/o stroke         HISTORY OF PRESENT ILLNESS: 1 or more elements      History From: Patient, daughter, History limited by: No limitations     Brad Ortiz is a 80 y.o. female with history of hypertension who presents with chief complaint of right arm weakness. Patient woke up Tuesday morning with the symptoms. She endorses feeling that her arm was asleep, she was unable to use  strength and felt that her forearm was also weak. She does endorse paresthesias and numbness to her arm at that time but that started to resolve. Currently she still has decreased  strength and decreased arm strength of the right side. She denies any left arm weakness or numbness, any lower extremity weakness or numbness. Denies any new neck pain, injury, trauma. Denies any dysarthria, dysphagia, headache, or visual changes. No prior history of CVA. Nursing Notes were all reviewed and agreed with or any disagreements were addressed in the HPI. REVIEW OF SYSTEMS        Positives and Pertinent negatives as per HPI.     PAST HISTORY     Past Medical History:  Past Medical History:   Diagnosis Date    Arthritis     back    Cataract     Hypertension     Vertigo        Past Surgical History:  Past Surgical History:   Procedure Laterality Date    COLONOSCOPY N/A 2/5/2019    COLONOSCOPY performed by Therese Ward MD at [facility] ENDOSCOPY    COLONOSCOPY,JESSICA Sutherland  11/24/2015         COLORECTAL SCRN; HI RISK IND  11/24/2015 COLORECTAL SCRN; HI RISK IND  2/5/2019         HX HYSTERECTOMY      HX OTHER SURGICAL Left 2008    Fatty tumor removed L shoulder    OH COLSC FLX W/RMVL OF TUMOR POLYP LESION SNARE TQ  6/8/2010            Family History:  Family History   Problem Relation Age of Onset    Diabetes Mother     Heart Disease Mother 61    Hypertension Mother     Hypertension Father     Arthritis-rheumatoid Sister     Cancer Sister         stomach    Hypertension Sister     Arthritis-rheumatoid Brother     Hypertension Brother     Stroke Sister     Kidney Disease Brother     Cancer Daughter         colon       Social History:  Social History     Tobacco Use    Smoking status: Never    Smokeless tobacco: Never   Substance Use Topics    Alcohol use: No    Drug use: No     Types: Prescription, OTC       Allergies:  No Known Allergies    CURRENT MEDICATIONS      Previous Medications    AMLODIPINE (NORVASC) 10 MG TABLET    Take 1 Tablet by mouth daily. CALCIUM CARBONATE (CALTRATE 600 PO)    Take 1 Tab by mouth two (2) times a day. CHOLECALCIFEROL (VITAMIN D3) (1000 UNITS /25 MCG) TABLET    Take 2 Tablets by mouth daily. DOXAZOSIN (CARDURA) 1 MG TABLET    Take 1 Tablet by mouth daily. GABAPENTIN (NEURONTIN) 100 MG CAPSULE    Take 100 mg by mouth once as needed for Pain. GABAPENTIN (NEURONTIN) 300 MG CAPSULE    Take 300 mg by mouth three (3) times daily as needed. LISINOPRIL-HYDROCHLOROTHIAZIDE (PRINZIDE, ZESTORETIC) 20-12.5 MG PER TABLET    2 qd    METOPROLOL SUCCINATE (TOPROL-XL) 50 MG XL TABLET    Take 1 Tablet by mouth daily. MULTIVITAMINS W-MINERALS/LUT (CENTRUM SILVER PO)    Take 1 Tab by mouth daily. TIMOLOL (TIMOPTIC) 0.5 % OPHTHALMIC SOLUTION    Administer 1 Drop to both eyes daily.        SCREENINGS               No data recorded         PHYSICAL EXAM      ED Triage Vitals [02/10/23 1051]   ED Encounter Vitals Group      BP (!) 146/100      Pulse (Heart Rate) 85      Resp Rate 14      Temp 98.6 °F (37 °C) Temp src       O2 Sat (%) 100 %      Weight 167 lb 1.7 oz      Height 5' 2\"        Physical Exam  Vitals and nursing note reviewed. Constitutional:       General: She is not in acute distress. Appearance: Normal appearance. She is not ill-appearing. Cardiovascular:      Rate and Rhythm: Normal rate and regular rhythm. Heart sounds: No murmur heard. Pulmonary:      Effort: Pulmonary effort is normal. No respiratory distress. Breath sounds: Normal breath sounds. Musculoskeletal:      Cervical back: Normal range of motion and neck supple. No tenderness. Neurological:      Mental Status: She is alert. Comments: Cranial nerves intact,  sensation intact. Motor 5/5 throughout all extremities with exception of right upper extremity which has preserved proximal strength but forearm and  strength appears diminished. Normal sensation. No cerebellar dysfunction.         DIAGNOSTIC RESULTS   LABS:     Recent Results (from the past 12 hour(s))   GLUCOSE, POC    Collection Time: 02/10/23 10:54 AM   Result Value Ref Range    Glucose (POC) 93 65 - 117 mg/dL    Performed by Michael Young \"Myra\"    EKG, 12 LEAD, INITIAL    Collection Time: 02/10/23 10:57 AM   Result Value Ref Range    Ventricular Rate 73 BPM    Atrial Rate 73 BPM    P-R Interval 160 ms    QRS Duration 88 ms    Q-T Interval 376 ms    QTC Calculation (Bezet) 414 ms    Calculated P Axis 63 degrees    Calculated R Axis -4 degrees    Calculated T Axis 43 degrees    Diagnosis       ** Poor data quality, interpretation may be adversely affected  Normal sinus rhythm  Normal ECG  When compared with ECG of 18-MAR-2016 02:29,  No significant change was found     CBC WITH AUTOMATED DIFF    Collection Time: 02/10/23 11:04 AM   Result Value Ref Range    WBC 5.2 3.6 - 11.0 K/uL    RBC 4.54 3.80 - 5.20 M/uL    HGB 12.1 11.5 - 16.0 g/dL    HCT 38.7 35.0 - 47.0 %    MCV 85.2 80.0 - 99.0 FL    MCH 26.7 26.0 - 34.0 PG    MCHC 31.3 30.0 - 36.5 g/dL RDW 14.2 11.5 - 14.5 %    PLATELET 412 949 - 381 K/uL    MPV 10.9 8.9 - 12.9 FL    NRBC 0.0 0  WBC    ABSOLUTE NRBC 0.00 0.00 - 0.01 K/uL    NEUTROPHILS 49 32 - 75 %    LYMPHOCYTES 37 12 - 49 %    MONOCYTES 11 5 - 13 %    EOSINOPHILS 2 0 - 7 %    BASOPHILS 1 0 - 1 %    IMMATURE GRANULOCYTES 0 0.0 - 0.5 %    ABS. NEUTROPHILS 2.6 1.8 - 8.0 K/UL    ABS. LYMPHOCYTES 1.9 0.8 - 3.5 K/UL    ABS. MONOCYTES 0.6 0.0 - 1.0 K/UL    ABS. EOSINOPHILS 0.1 0.0 - 0.4 K/UL    ABS. BASOPHILS 0.0 0.0 - 0.1 K/UL    ABS. IMM. GRANS. 0.0 0.00 - 0.04 K/UL    DF AUTOMATED     METABOLIC PANEL, COMPREHENSIVE    Collection Time: 02/10/23 11:04 AM   Result Value Ref Range    Sodium 140 136 - 145 mmol/L    Potassium 3.4 (L) 3.5 - 5.1 mmol/L    Chloride 104 97 - 108 mmol/L    CO2 31 21 - 32 mmol/L    Anion gap 5 5 - 15 mmol/L    Glucose 100 65 - 100 mg/dL    BUN 17 6 - 20 MG/DL    Creatinine 0.98 0.55 - 1.02 MG/DL    BUN/Creatinine ratio 17 12 - 20      eGFR 58 (L) >60 ml/min/1.73m2    Calcium 9.3 8.5 - 10.1 MG/DL    Bilirubin, total 0.3 0.2 - 1.0 MG/DL    ALT (SGPT) 14 12 - 78 U/L    AST (SGOT) 10 (L) 15 - 37 U/L    Alk. phosphatase 61 45 - 117 U/L    Protein, total 7.6 6.4 - 8.2 g/dL    Albumin 3.5 3.5 - 5.0 g/dL    Globulin 4.1 (H) 2.0 - 4.0 g/dL    A-G Ratio 0.9 (L) 1.1 - 2.2     PROTHROMBIN TIME + INR    Collection Time: 02/10/23 11:04 AM   Result Value Ref Range    INR 1.0 0.9 - 1.1      Prothrombin time 10.3 9.0 - 11.1 sec   PTT    Collection Time: 02/10/23 11:04 AM   Result Value Ref Range    aPTT 23.7 22.1 - 31.0 sec    aPTT, therapeutic range     58.0 - 77.0 SECS   TROPONIN-HIGH SENSITIVITY    Collection Time: 02/10/23 11:04 AM   Result Value Ref Range    Troponin-High Sensitivity 5 0 - 51 ng/L        EKG:  If performed, independent interpretation documented below in the MDM section     RADIOLOGY:  Non-plain film images such as CT, Ultrasound and MRI are read by the radiologist. Plain radiographic images are visualized and preliminarily interpreted by the ED Provider with the findings documented in the MDM section. Interpretation per the Radiologist below, if available at the time of this note:     CT PERF W CBF    Result Date: 2/10/2023  CLINICAL HISTORY: Code Stroke EXAMINATION:  CT ANGIOGRAPHY HEAD AND NECK, CT PERFUSION COMPARISON: None TECHNIQUE:  Following the uneventful administration of iodinated contrast material, axial CT angiography of the head and neck was performed. Delayed axial images through the head were also obtained. Coronal and sagittal reconstructions were obtained. Manual postprocessing of images was performed. 3-D  Sagittal maximal intensity projection images were obtained. 3-D Coronal maximal intensity projections were obtained. CT brain perfusion was performed with generation of hemodynamic maps of multiple parameters, including cerebral blood flow, cerebral blood volume, mean transit time, and TMAX. CT dose reduction was achieved through use of a standardized protocol tailored for this examination and automatic exposure control for dose modulation. This study was analyzed by the [address] ai algorithm. FINDINGS: DELAYED ENHANCEMENT HEAD CT No intra or extra-axial mass or collection. Ventricles are normal in size and configuration. The basal cisterns are patent. Dural venous sinuses are patent. No abnormal parenchymal or meningeal enhancement. Mastoid air cells and paranasal sinuses are clear. CTA NECK: Great vessels: Normal arch anatomy with the origins patent. Right subclavian artery: Patent Left subclavian artery: Patent Right common carotid artery: Patent Left common carotid artery: Patent Cervical right internal carotid artery: Patent with no significant stenosis by NASCET criteria. Cervical left internal carotid artery: Patent with no significant stenosis by NASCET criteria.  Right vertebral artery: Patent Left vertebral artery: Patent Partially visualized cluster of 5 mm nodules in the right upper lobe (series 301 image 1). The thyroid is homogeneous. No cervical lymphadenopathy. Measurements utilize NASCET criteria. CTA HEAD: Right cavernous internal carotid artery: Patent Left cavernous internal carotid artery: Patent Anterior cerebral arteries: Patent Anterior communicating artery: Patent Right middle cerebral artery: Patent Left middle cerebral artery: Patent Posterior communicating arteries: Patent Posterior cerebral arteries: Patent, fetal right PCA Basilar artery: Patent Distal vertebral arteries: Patent No evidence for intracranial aneurysm or hemodynamically significant stenosis. CT Perfusion: Normal CT perfusion. 1.  No large vessel occlusion or hemodynamically significant stenosis. No perfusion abnormality. 2.  Partially visualized pulmonary nodules in the right upper lobe measuring at least 5 mm. Recommend chest CT for further workup. CTA CODE NEURO HEAD AND NECK W CONT    Result Date: 2/10/2023  CLINICAL HISTORY: Code Stroke EXAMINATION:  CT ANGIOGRAPHY HEAD AND NECK, CT PERFUSION COMPARISON: None TECHNIQUE:  Following the uneventful administration of iodinated contrast material, axial CT angiography of the head and neck was performed. Delayed axial images through the head were also obtained. Coronal and sagittal reconstructions were obtained. Manual postprocessing of images was performed. 3-D  Sagittal maximal intensity projection images were obtained. 3-D Coronal maximal intensity projections were obtained. CT brain perfusion was performed with generation of hemodynamic maps of multiple parameters, including cerebral blood flow, cerebral blood volume, mean transit time, and TMAX. CT dose reduction was achieved through use of a standardized protocol tailored for this examination and automatic exposure control for dose modulation. This study was analyzed by the [address] ai algorithm. FINDINGS: DELAYED ENHANCEMENT HEAD CT No intra or extra-axial mass or collection.  Ventricles are normal in size and configuration. The basal cisterns are patent. Dural venous sinuses are patent. No abnormal parenchymal or meningeal enhancement. Mastoid air cells and paranasal sinuses are clear. CTA NECK: Great vessels: Normal arch anatomy with the origins patent. Right subclavian artery: Patent Left subclavian artery: Patent Right common carotid artery: Patent Left common carotid artery: Patent Cervical right internal carotid artery: Patent with no significant stenosis by NASCET criteria. Cervical left internal carotid artery: Patent with no significant stenosis by NASCET criteria. Right vertebral artery: Patent Left vertebral artery: Patent Partially visualized cluster of 5 mm nodules in the right upper lobe (series 301 image 1). The thyroid is homogeneous. No cervical lymphadenopathy. Measurements utilize NASCET criteria. CTA HEAD: Right cavernous internal carotid artery: Patent Left cavernous internal carotid artery: Patent Anterior cerebral arteries: Patent Anterior communicating artery: Patent Right middle cerebral artery: Patent Left middle cerebral artery: Patent Posterior communicating arteries: Patent Posterior cerebral arteries: Patent, fetal right PCA Basilar artery: Patent Distal vertebral arteries: Patent No evidence for intracranial aneurysm or hemodynamically significant stenosis. CT Perfusion: Normal CT perfusion. 1.  No large vessel occlusion or hemodynamically significant stenosis. No perfusion abnormality. 2.  Partially visualized pulmonary nodules in the right upper lobe measuring at least 5 mm. Recommend chest CT for further workup. CT CODE NEURO HEAD WO CONTRAST    Result Date: 2/10/2023  EXAM: CT CODE NEURO HEAD WO CONTRAST INDICATION: Code Stroke COMPARISON: CT 3/18/2016. MRI 3/18/2016. CONTRAST: None. TECHNIQUE: Unenhanced CT of the head was performed using 5 mm images. Brain and bone windows were generated. Coronal and sagittal reformats.  CT dose reduction was achieved through use of a standardized protocol tailored for this examination and automatic exposure control for dose modulation. FINDINGS: The ventricles and sulci are normal in size, shape and configuration. There is mild periventricular and subcortical white matter hypodensity, not significantly changed from prior. There is no intracranial hemorrhage, extra-axial collection, or mass effect. The basilar cisterns are open. No CT evidence of acute infarct. Atherosclerotic calcifications affect the carotid siphons and vertebrobasilar system. The bone windows demonstrate no abnormalities. The visualized portions of the paranasal sinuses and mastoid air cells are clear. No acute infarct or other acute findings. Nonspecific chronic white matter changes most likely reflective of chronic small vessel ischemic change. Intracranial atherosclerosis.        PROCEDURES   Unless otherwise noted below, none  Procedures     CRITICAL CARE TIME   0    EMERGENCY DEPARTMENT COURSE and DIFFERENTIAL DIAGNOSIS/MDM   Vitals:    Vitals:    02/10/23 1051   BP: (!) 146/100   Pulse: 85   Resp: 14   Temp: 98.6 °F (37 °C)   SpO2: 100%   Weight: 75.8 kg (167 lb 1.7 oz)   Height: 5' 2\" (1.575 m)        Patient was given the following medications:  Medications   iopamidoL (ISOVUE-370) 370 mg iodine /mL (76 %) injection 100 mL (has no administration in time range)   potassium chloride (K-DUR, KLOR-CON M20) SR tablet 60 mEq (has no administration in time range)   amLODIPine (NORVASC) tablet 10 mg (has no administration in time range)   calcium carbonate (OS-CELY) tablet 500 mg [elemental] (has no administration in time range)   cholecalciferol (VITAMIN D3) (1000 Units /25 mcg) tablet 2,000 Units (has no administration in time range)   doxazosin (CARDURA) tablet 1 mg (has no administration in time range)   gabapentin (NEURONTIN) capsule 300 mg (has no administration in time range)   lisinopriL (PRINIVIL, ZESTRIL) 20 mg, hydroCHLOROthiazide (MICROZIDE) 12.5 mg (has no administration in time range)   metoprolol succinate (TOPROL-XL) XL tablet 50 mg (has no administration in time range)   multivitamin, tx-iron-ca-min (THERA-M w/ IRON) tablet 1 Tablet (has no administration in time range)   timolol (TIMOPTIC) 0.5 % ophthalmic solution 1 Drop (has no administration in time range)   acetaminophen (TYLENOL) tablet 650 mg (has no administration in time range)     Or   acetaminophen (TYLENOL) solution 650 mg (has no administration in time range)     Or   acetaminophen (TYLENOL) suppository 650 mg (has no administration in time range)   aspirin chewable tablet 81 mg (has no administration in time range)   atorvastatin (LIPITOR) tablet 40 mg (has no administration in time range)   heparin (porcine) injection 5,000 Units (has no administration in time range)   morphine injection 2 mg (has no administration in time range)   melatonin tablet 3 mg (has no administration in time range)   hydrALAZINE (APRESOLINE) 20 mg/mL injection 10 mg (has no administration in time range)   guaiFENesin (ROBITUSSIN) 100 mg/5 mL oral liquid 100 mg (has no administration in time range)   albuterol CONCENTRATE 2.5mg/0.5 mL neb soln (has no administration in time range)       Medical Decision Making  Amount and/or Complexity of Data Reviewed  Independent Historian: caregiver     Details: daughter  Labs: ordered. Decision-making details documented in ED Course. Radiology: ordered. ECG/medicine tests: ordered. Decision-making details documented in ED Course. Discussion of management or test interpretation with external provider(s): Neurology, Dr Sanjiv Brothers regarding hospitalization. 25-year-old female presenting to the emergency department with right arm weakness and transient numbness over the past 3 days, woke up with the symptoms. Afebrile and vital signs are stable. No other strokelike symptoms. No prior history of stroke.   Differential diagnosis includes acute CVA versus acute radiculopathy, myelopathy, peripheral nerve entrapment. Patient was evaluated with CT head, CTA head neck, CT C-spine without acute findings. I consulted neurology and spoke with Dr. Cosme Dueñas who feels this is more likely a neuropathy/myelopathy from C-spine or peripheral etiology but recommends admission for MRI brain, stroke work-up, and MRI C-spine. ED Course as of 02/10/23 1517   Fri Feb 10, 2023   1353 EKG per my interpretation normal sinus rhythm, rate 73 bpm, normal axis, no acute ischemic changes or interval changes. [AK]      ED Course User Index  [AK] Garrie Lesch, MD         Cardiac Monitoring: The cardiac monitor revealed the following rhythm as interpreted by me: Normal Sinus Rhythm, rate 85 bpm  The cardiac monitor was ordered secondary to the patient's reported complaint of strokelike symptoms and to monitor the patient for dysrhythmia. Rebekah Gee MD      FINAL IMPRESSION     1. Right arm weakness          DISPOSITION/PLAN     Admission Note:  Patient is being admitted to the hospital by Dr. Berna Olsen, Service: Hospitalist.  The results of their tests and reasons for their admission have been discussed with them and available family. They convey agreement and understanding for the need to be admitted and for their admission diagnosis. CLINICAL IMPRESSION    1. Right arm weakness         DISPOSITION  Admit         I am the Primary Clinician of Record. Rebekah Gee MD (electronically signed)    (Please note that parts of this dictation were completed with voice recognition software. Quite often unanticipated grammatical, syntax, homophones, and other interpretive errors are inadvertently transcribed by the computer software. Please disregards these errors.  Please excuse any errors that have escaped final proofreading.)

## 2023-02-10 NOTE — CONSULTS
Consult  REFERRED BY:  Erick Singh MD    CHIEF COMPLAINT: Right arm and hand weakness      Subjective:     Afshan Rose is a 80 y. o.is a 80 y.o. right-handed -American female we are seeing as a new patient, at the request of the hospitalist, for evaluation of 3-day history of right hand and arm weakness that she awoke with 3 days ago, and seem to get worse today, so she came to the hospital for further evaluation. Her CTA of the head neck is normal, and her CT of the head is normal.  She has never had a stroke before but she did have vertigo before. She had no chest pain or palpitations and no other cardiac symptoms. She denies any involvement of the face or legs or the left side. She denies any other focal neurologic symptoms. She takes a baby aspirin at home and has a history of high blood pressure, and chronic sciatica from degenerative lumbar disease, and takes gabapentin for that. She does not have much neck pain and had no neck pain associated with this.     Past Medical History:   Diagnosis Date    Arthritis     back    Cataract     Hypertension     Vertigo       Past Surgical History:   Procedure Laterality Date    COLONOSCOPY N/A 2/5/2019    COLONOSCOPY performed by Fidelina Ahn MD at [facility] ENDOSCOPY    COLONOSCOPY,JESSICA Meza  11/24/2015         COLORECTAL SCRN; HI RISK IND  11/24/2015         COLORECTAL SCRN; HI RISK IND  2/5/2019         HX HYSTERECTOMY      HX OTHER SURGICAL Left 2008    Fatty tumor removed L shoulder    [place] COLSC FLX W/RMVL OF TUMOR POLYP LESION SNARE TQ  6/8/2010          Family History   Problem Relation Age of Onset    Diabetes Mother     Heart Disease Mother 61    Hypertension Mother     Hypertension Father     Arthritis-rheumatoid Sister     Cancer Sister         stomach    Hypertension Sister     Arthritis-rheumatoid Brother     Hypertension Brother     Stroke Sister     Kidney Disease Brother     Cancer Daughter         colon      Social History     Tobacco Use    Smoking status: Never    Smokeless tobacco: Never   Substance Use Topics    Alcohol use: No         Current Facility-Administered Medications:     iopamidoL (ISOVUE-370) 370 mg iodine /mL (76 %) injection 100 mL, 100 mL, IntraVENous, RAD ONCE, Laine Varela MD    [START ON 2/11/2023] amLODIPine (NORVASC) tablet 10 mg, 10 mg, Oral, DAILY, Laine Varela MD    calcium carbonate (OS-CEYL) tablet 500 mg [elemental], 500 mg, Oral, BID, Pricilla Carrion MD    [START ON 2/11/2023] cholecalciferol (VITAMIN D3) (1000 Units /25 mcg) tablet 2,000 Units, 2,000 Units, Oral, DAILY, Pricilla Carrion MD    [START ON 2/11/2023] doxazosin (CARDURA) tablet 1 mg, 1 mg, Oral, DAILY, Ammon Varela MD    gabapentin (NEURONTIN) capsule 300 mg, 300 mg, Oral, TID, Pricilla Carrion MD, 300 mg at 02/10/23 1537    [START ON 2/11/2023] lisinopriL (PRINIVIL, ZESTRIL) 20 mg, hydroCHLOROthiazide (MICROZIDE) 12.5 mg, , Oral, DAILY, Ammon Olivares MD    [START ON 2/11/2023] metoprolol succinate (TOPROL-XL) XL tablet 50 mg, 50 mg, Oral, DAILY, Ammon Varela MD    [START ON 2/11/2023] multivitamin, tx-iron-ca-min (THERA-M w/ IRON) tablet 1 Tablet, 1 Tablet, Oral, DAILY, Laine Varela MD    [START ON 2/11/2023] timolol (TIMOPTIC) 0.5 % ophthalmic solution 1 Drop, 1 Drop, Both Eyes, DAILY, Ammon Varela MD    acetaminophen (TYLENOL) tablet 650 mg, 650 mg, Oral, Q4H PRN **OR** acetaminophen (TYLENOL) solution 650 mg, 650 mg, Per NG tube, Q4H PRN **OR** acetaminophen (TYLENOL) suppository 650 mg, 650 mg, Rectal, Q4H PRN, Pricilla Carrion MD    [START ON 2/11/2023] aspirin chewable tablet 81 mg, 81 mg, Oral, DAILY, Pricilla Carrion MD    atorvastatin (LIPITOR) tablet 40 mg, 40 mg, Oral, QHS, Ammon Varela MD    heparin (porcine) injection 5,000 Units, 5,000 Units, SubCUTAneous, Q8H, Laine Varela MD    morphine injection 2 mg, 2 mg, IntraVENous, Q4H PRN, Laine Varela MD    melatonin tablet 3 mg, 3 mg, Oral, QHS PRN, Raenell Hashimoto, Ahmed Wanetta Rude, MD    hydrALAZINE (APRESOLINE) 20 mg/mL injection 10 mg, 10 mg, IntraVENous, Q6H PRN, Laine Varela MD    guaiFENesin (ROBITUSSIN) 100 mg/5 mL oral liquid 100 mg, 100 mg, Oral, Q4H PRN, Laine Varela MD    albuterol CONCENTRATE 2.5mg/0.5 mL neb soln, 1.25 mg, Nebulization, Q4H PRN, Jaimee Kraft MD    [START ON 2/11/2023] clopidogreL (PLAVIX) tablet 75 mg, 75 mg, Oral, DAILY, Sherice Means MD    Current Outpatient Medications:     doxazosin (CARDURA) 1 mg tablet, Take 1 Tablet by mouth daily. , Disp: 90 Tablet, Rfl: 3    cholecalciferol (VITAMIN D3) (1000 Units /25 mcg) tablet, Take 2 Tablets by mouth daily. , Disp: 60 Tablet, Rfl: 0    amLODIPine (NORVASC) 10 mg tablet, Take 1 Tablet by mouth daily. , Disp: 90 Tablet, Rfl: 3    lisinopril-hydroCHLOROthiazide (PRINZIDE, ZESTORETIC) 20-12.5 mg per tablet, 2 qd, Disp: 180 Tablet, Rfl: 3    metoprolol succinate (TOPROL-XL) 50 mg XL tablet, Take 1 Tablet by mouth daily. , Disp: 90 Tablet, Rfl: 3    gabapentin (NEURONTIN) 100 mg capsule, Take 100 mg by mouth once as needed for Pain., Disp: , Rfl:     gabapentin (NEURONTIN) 300 mg capsule, Take 300 mg by mouth three (3) times daily as needed. , Disp: , Rfl:     timolol (TIMOPTIC) 0.5 % ophthalmic solution, Administer 1 Drop to both eyes daily. , Disp: , Rfl:     MULTIVITAMINS W-MINERALS/LUT (CENTRUM SILVER PO), Take 1 Tab by mouth daily. , Disp: , Rfl:     CALCIUM CARBONATE (CALTRATE 600 PO), Take 1 Tab by mouth two (2) times a day., Disp: , Rfl:         No Known Allergies   MRI Results (most recent):  Results from Hospital Encounter encounter on 01/28/17    MRI LUMB SPINE WO CONT    Narrative  EXAM:  MRI LUMB SPINE WO CONT      HISTORY: Degenerative change in the lumbar spine, low back pain and sciatica  INDICATION:  DDD (degenerative disc disease), lumbar, Sciatica. Other  intervertebral disc degeneration, lumbar region    COMPARISON: None    TECHNIQUE: MR imaging of the lumbar spine was performed using the following  sequences: sagittal T1, T2, STIR;  axial T1, T2.    CONTRAST:  None. FINDINGS:    There is normal alignment of the lumbar spine. Vertebral body heights are  maintained. There is no focal marrow lesion. There is minimal multilevel disc  desiccation. Right renal cyst. Right hepatic cyst. Abdominal aorta is normal in  caliber. Proximal common iliac vessels are normal in caliber as well. The conus medullaris terminates at T12. Signal and caliber of the distal spinal  cord are within normal limits. The paraspinal soft tissues are within normal limits. Lower thoracic spine: No herniation or stenosis. L1-L2:  No herniation or stenosis. Mild facet arthropathy. L2-L3:  Mild facet arthropathy. Disc desiccation and disc bulge. The canal is  patent. There is minimal right foraminal stenosis. L3-L4:  Moderate facet arthropathy and hypertrophy. Disc desiccation. Disc  bulge. The canal is patent. There is mild right foraminal stenosis. L4-L5:  Mild facet arthropathy. Mild central disc protrusion. Mild canal and  moderate left foraminal stenosis. L5-S1:  Mild facet arthropathy. Mild left foraminal protrusion. The canal is  widely patent. Mild right foraminal stenosis. Impression  IMPRESSION:  Mild multilevel disc and facet degenerative change. Mild canal and moderate left foraminal stenosis L4-5. Mild right foraminal stenoses at L3-L4 and L5-S1      Results from Hospital Encounter encounter on 01/28/17    MRI LUMB SPINE WO CONT    Narrative  EXAM:  MRI LUMB SPINE WO CONT      HISTORY: Degenerative change in the lumbar spine, low back pain and sciatica  INDICATION:  DDD (degenerative disc disease), lumbar, Sciatica.   Other  intervertebral disc degeneration, lumbar region    COMPARISON: None    TECHNIQUE: MR imaging of the lumbar spine was performed using the following  sequences: sagittal T1, T2, STIR;  axial T1, T2.    CONTRAST:  None. FINDINGS:    There is normal alignment of the lumbar spine. Vertebral body heights are  maintained. There is no focal marrow lesion. There is minimal multilevel disc  desiccation. Right renal cyst. Right hepatic cyst. Abdominal aorta is normal in  caliber. Proximal common iliac vessels are normal in caliber as well. The conus medullaris terminates at T12. Signal and caliber of the distal spinal  cord are within normal limits. The paraspinal soft tissues are within normal limits. Lower thoracic spine: No herniation or stenosis. L1-L2:  No herniation or stenosis. Mild facet arthropathy. L2-L3:  Mild facet arthropathy. Disc desiccation and disc bulge. The canal is  patent. There is minimal right foraminal stenosis. L3-L4:  Moderate facet arthropathy and hypertrophy. Disc desiccation. Disc  bulge. The canal is patent. There is mild right foraminal stenosis. L4-L5:  Mild facet arthropathy. Mild central disc protrusion. Mild canal and  moderate left foraminal stenosis. L5-S1:  Mild facet arthropathy. Mild left foraminal protrusion. The canal is  widely patent. Mild right foraminal stenosis. Impression  IMPRESSION:  Mild multilevel disc and facet degenerative change. Mild canal and moderate left foraminal stenosis L4-5. Mild right foraminal stenoses at L3-L4 and L5-S1    Review of Systems:  A comprehensive review of systems was negative except for: Neurological: positive for paresthesia, coordination problems, and weakness   Vitals:    02/10/23 1051 02/10/23 1527   BP: (!) 146/100    Pulse: 85 62   Resp: 14    Temp: 98.6 °F (37 °C)    SpO2: 100% 97%   Weight: 167 lb 1.7 oz (75.8 kg)    Height: 5' 2\" (1.575 m)      Objective:     I      NEUROLOGICAL EXAM:    Appearance:   The patient is well developed, well nourished, provides a coherent history and is in no acute distress. Mental Status: Oriented to time, place and person, and the president, cognitive function is normal and speech is fluent and no aphasia or dysarthria. Mood and affect appropriate. Cranial Nerves:   Intact visual fields. Fundi are benign, disc are flat, no lesions seen on funduscopy. CIERRA, EOM's full, no nystagmus, no ptosis. Facial sensation is normal. Corneal reflexes are not tested. Facial movement is minimally asymmetric on the right. Hearing is normal bilaterally. Palate is midline with normal sternocleidomastoid and trapezius muscles are normal. Tongue is midline. Neck without meningismus or bruits  Temporal arteries are not tender or enlarged  TMJ areas are not tender on palpation   Motor:  5/5 strength in upper and lower proximal and distal muscles, except in the right upper extremity where she has mild weakness of the right arm and hand and decreased coordination and arm drift. . Normal bulk and tone. No fasciculations. Rapid alternating movement is normal except in the right upper extremity. There is decreased   Reflexes:   Deep tendon reflexes 2+/4 in the right upper extremity and 1+ in the other extremities  No babinski or clonus present   Sensory:   Normal to touch, pinprick and vibration and temperature. DSS is intact   Gait:  Normal gait for patient's age. Tremor:   No tremor noted. Cerebellar:  No abnormal cerebellar signs present on Romberg and tandem testing and finger-nose-finger exam.   Neurovascular:  Normal heart sounds and regular rhythm, peripheral pulses intact, and no carotid bruits.            Assessment:   [unfilled]  Active Problems:    TIA (transient ischemic attack) (2/10/2023)      Bilateral carotid artery stenosis (2/10/2023)      Thrombotic stroke involving left middle cerebral artery ([facility] [address] (2/10/2023)        Plan:     Patient with right hand and arm weakness and minimal facial asymmetry, that would certainly suggest a lacunar type infarct of the left hemisphere. She was on aspirin therapy, and she is now on aspirin and high-dose statin, so we will add Plavix to that, and await the results of the MRI scan. If the MRI scan shows typical lacunar type infarct no further work-up is needed, and she does need to be maintained on dual antiplatelet therapy, get physical and Occupational Therapy, and follow-up in stroke clinic in 2 to 4 weeks time. We will follow if there is any other change on her MRI scan. Otherwise we will follow as needed and she can follow-up in stroke clinic. 70 minutes spent reviewing her records, reviewing her scans of CTA and CT, all her lab studies, and discussing her diagnosis prognosis with the patient and her daughter in detail.     Signed By: Darby Lopez MD     February 10, 2023       CC: Bolivar Mckay MD  FAX: [phone]

## 2023-02-11 ENCOUNTER — APPOINTMENT (OUTPATIENT)
Dept: CT IMAGING | Age: 83
DRG: 066 | End: 2023-02-11
Attending: INTERNAL MEDICINE
Payer: MEDICARE

## 2023-02-11 PROBLEM — I63.9 CVA (CEREBRAL VASCULAR ACCIDENT) (HCC): Status: ACTIVE | Noted: 2023-02-11

## 2023-02-11 LAB
25(OH)D3 SERPL-MCNC: 46.1 NG/ML (ref 30–100)
ANION GAP SERPL CALC-SCNC: 4 MMOL/L (ref 5–15)
BUN SERPL-MCNC: 18 MG/DL (ref 6–20)
BUN/CREAT SERPL: 19 (ref 12–20)
CALCIUM SERPL-MCNC: 9.9 MG/DL (ref 8.5–10.1)
CHLORIDE SERPL-SCNC: 104 MMOL/L (ref 97–108)
CHOLEST SERPL-MCNC: 176 MG/DL
CK SERPL-CCNC: 101 U/L (ref 26–192)
CO2 SERPL-SCNC: 32 MMOL/L (ref 21–32)
CREAT SERPL-MCNC: 0.97 MG/DL (ref 0.55–1.02)
ERYTHROCYTE [DISTWIDTH] IN BLOOD BY AUTOMATED COUNT: 14.3 % (ref 11.5–14.5)
ERYTHROCYTE [SEDIMENTATION RATE] IN BLOOD: 33 MM/HR (ref 0–30)
EST. AVERAGE GLUCOSE BLD GHB EST-MCNC: 120 MG/DL
GLUCOSE SERPL-MCNC: 94 MG/DL (ref 65–100)
HBA1C MFR BLD: 5.8 % (ref 4–5.6)
HCT VFR BLD AUTO: 38 % (ref 35–47)
HDLC SERPL-MCNC: 49 MG/DL
HDLC SERPL: 3.6 (ref 0–5)
HGB BLD-MCNC: 11.8 G/DL (ref 11.5–16)
LDLC SERPL CALC-MCNC: 103.4 MG/DL (ref 0–100)
MCH RBC QN AUTO: 27.1 PG (ref 26–34)
MCHC RBC AUTO-ENTMCNC: 31.1 G/DL (ref 30–36.5)
MCV RBC AUTO: 87.2 FL (ref 80–99)
NRBC # BLD: 0 K/UL (ref 0–0.01)
NRBC BLD-RTO: 0 PER 100 WBC
PLATELET # BLD AUTO: 215 K/UL (ref 150–400)
PMV BLD AUTO: 11.2 FL (ref 8.9–12.9)
POTASSIUM SERPL-SCNC: 4.2 MMOL/L (ref 3.5–5.1)
RBC # BLD AUTO: 4.36 M/UL (ref 3.8–5.2)
SODIUM SERPL-SCNC: 140 MMOL/L (ref 136–145)
TRIGL SERPL-MCNC: 118 MG/DL (ref ?–150)
TSH SERPL DL<=0.05 MIU/L-ACNC: 1.83 UIU/ML (ref 0.36–3.74)
VIT B12 SERPL-MCNC: 204 PG/ML (ref 193–986)
VLDLC SERPL CALC-MCNC: 23.6 MG/DL
WBC # BLD AUTO: 4.5 K/UL (ref 3.6–11)

## 2023-02-11 PROCEDURE — 85027 COMPLETE CBC AUTOMATED: CPT

## 2023-02-11 PROCEDURE — 97161 PT EVAL LOW COMPLEX 20 MIN: CPT

## 2023-02-11 PROCEDURE — 84443 ASSAY THYROID STIM HORMONE: CPT

## 2023-02-11 PROCEDURE — G0378 HOSPITAL OBSERVATION PER HR: HCPCS

## 2023-02-11 PROCEDURE — 82607 VITAMIN B-12: CPT

## 2023-02-11 PROCEDURE — 82550 ASSAY OF CK (CPK): CPT

## 2023-02-11 PROCEDURE — 71260 CT THORAX DX C+: CPT

## 2023-02-11 PROCEDURE — 74011000636 HC RX REV CODE- 636: Performed by: INTERNAL MEDICINE

## 2023-02-11 PROCEDURE — 74011250636 HC RX REV CODE- 250/636: Performed by: INTERNAL MEDICINE

## 2023-02-11 PROCEDURE — 96372 THER/PROPH/DIAG INJ SC/IM: CPT

## 2023-02-11 PROCEDURE — 74011250637 HC RX REV CODE- 250/637: Performed by: INTERNAL MEDICINE

## 2023-02-11 PROCEDURE — 80061 LIPID PANEL: CPT

## 2023-02-11 PROCEDURE — 97535 SELF CARE MNGMENT TRAINING: CPT | Performed by: OCCUPATIONAL THERAPIST

## 2023-02-11 PROCEDURE — 82306 VITAMIN D 25 HYDROXY: CPT

## 2023-02-11 PROCEDURE — 83036 HEMOGLOBIN GLYCOSYLATED A1C: CPT

## 2023-02-11 PROCEDURE — 36415 COLL VENOUS BLD VENIPUNCTURE: CPT

## 2023-02-11 PROCEDURE — 74011000250 HC RX REV CODE- 250: Performed by: INTERNAL MEDICINE

## 2023-02-11 PROCEDURE — 85652 RBC SED RATE AUTOMATED: CPT

## 2023-02-11 PROCEDURE — 99233 SBSQ HOSP IP/OBS HIGH 50: CPT | Performed by: INTERNAL MEDICINE

## 2023-02-11 PROCEDURE — 65270000046 HC RM TELEMETRY

## 2023-02-11 PROCEDURE — 80048 BASIC METABOLIC PNL TOTAL CA: CPT

## 2023-02-11 PROCEDURE — 97165 OT EVAL LOW COMPLEX 30 MIN: CPT | Performed by: OCCUPATIONAL THERAPIST

## 2023-02-11 PROCEDURE — 74011250637 HC RX REV CODE- 250/637: Performed by: PSYCHIATRY & NEUROLOGY

## 2023-02-11 PROCEDURE — 97116 GAIT TRAINING THERAPY: CPT

## 2023-02-11 RX ADMIN — DOXAZOSIN 1 MG: 2 TABLET ORAL at 10:35

## 2023-02-11 RX ADMIN — HYDROCHLOROTHIAZIDE: 12.5 CAPSULE ORAL at 10:36

## 2023-02-11 RX ADMIN — AMLODIPINE BESYLATE 10 MG: 5 TABLET ORAL at 10:35

## 2023-02-11 RX ADMIN — HEPARIN SODIUM 5000 UNITS: 5000 INJECTION INTRAVENOUS; SUBCUTANEOUS at 15:30

## 2023-02-11 RX ADMIN — CALCIUM 500 MG: 500 TABLET ORAL at 17:51

## 2023-02-11 RX ADMIN — CHOLECALCIFEROL TAB 25 MCG (1000 UNIT) 2000 UNITS: 25 TAB at 10:34

## 2023-02-11 RX ADMIN — Medication 1 TABLET: at 10:34

## 2023-02-11 RX ADMIN — TIMOLOL MALEATE 1 DROP: 5 SOLUTION OPHTHALMIC at 11:58

## 2023-02-11 RX ADMIN — GABAPENTIN 300 MG: 300 CAPSULE ORAL at 21:06

## 2023-02-11 RX ADMIN — IOPAMIDOL 100 ML: 755 INJECTION, SOLUTION INTRAVENOUS at 17:00

## 2023-02-11 RX ADMIN — ATORVASTATIN CALCIUM 40 MG: 40 TABLET, FILM COATED ORAL at 21:06

## 2023-02-11 RX ADMIN — CLOPIDOGREL BISULFATE 75 MG: 75 TABLET ORAL at 10:34

## 2023-02-11 RX ADMIN — HEPARIN SODIUM 5000 UNITS: 5000 INJECTION INTRAVENOUS; SUBCUTANEOUS at 05:15

## 2023-02-11 RX ADMIN — METOPROLOL SUCCINATE 50 MG: 50 TABLET, EXTENDED RELEASE ORAL at 10:34

## 2023-02-11 RX ADMIN — CALCIUM 500 MG: 500 TABLET ORAL at 10:34

## 2023-02-11 RX ADMIN — GABAPENTIN 300 MG: 300 CAPSULE ORAL at 10:34

## 2023-02-11 RX ADMIN — HEPARIN SODIUM 5000 UNITS: 5000 INJECTION INTRAVENOUS; SUBCUTANEOUS at 21:06

## 2023-02-11 RX ADMIN — GABAPENTIN 300 MG: 300 CAPSULE ORAL at 15:30

## 2023-02-11 RX ADMIN — ASPIRIN 81 MG: 81 TABLET, CHEWABLE ORAL at 10:35

## 2023-02-11 NOTE — PROGRESS NOTES
Hospitalist Progress Note    NAME: Marguerite Bishop   :  1940   MRN:  [id number]       Assessment / Plan:  Acute CVA, left MCA territory  Right clumsy hand syndrome    -MRI brain positive for multiple acute infarct in left MCA territory  -Likely etiology small vessel disease/HTN  -CT head and CTA head and neck without LVO  -Continue aspirin 81 mg daily, reports compliance  -Continue added Plavix 75 mg daily, for 21 days  -.4, A1c 5.8  -Atorvastatin 40 mg daily  -Neurology on board  -Echo pending    HTN  -Continue metoprolol, lisinopril, hydrochlorothiazide, doxazosin    Lung nodule, 5 mm, will get dedicated CT chest to evaluate any other lung nodules      30.0 - 39.9 Obese / Body mass index is 30.56 kg/m². Estimated discharge date:   Barriers:    Code status: Full  Prophylaxis: Hep SQ  Recommended Disposition: Home w/Family     Subjective:     Chief Complaint / Reason for Physician Visit  Follow-up for CVA,  remained poor on right hand as well as coordination. Discussed with RN events overnight. Review of Systems:  Symptom Y/N Comments  Symptom Y/N Comments   Fever/Chills    Chest Pain     Poor Appetite    Edema     Cough    Abdominal Pain     Sputum    Joint Pain     SOB/ALVAREZ    Pruritis/Rash     Nausea/vomit    Tolerating PT/OT     Diarrhea    Tolerating Diet     Constipation    Other       Could NOT obtain due to:      Objective:     VITALS:   Last 24hrs VS reviewed since prior progress note.  Most recent are:  Patient Vitals for the past 24 hrs:   Temp Pulse Resp BP SpO2   23 1121 98.4 °F (36.9 °C) 75 20 119/87 97 %   23 1034 -- 68 -- -- --   23 0803 97.9 °F (36.6 °C) (!) 57 14 (!) 139/92 98 %   23 0454 97.3 °F (36.3 °C) 65 18 (!) 149/92 97 %   23 0400 -- 63 -- -- --   23 0003 97.5 °F (36.4 °C) (!) 58 18 (!) 132/92 97 %   02/10/23 2035 98.4 °F (36.9 °C) 64 18 127/85 97 %   02/10/23 2000 -- 68 -- -- --   02/10/23 1859 97.3 °F (36.3 °C) 64 16 138/77 99 %   02/10/23 1527 -- 62 -- -- 97 %     No intake or output data in the 24 hours ending 02/11/23 1245     I had a face to face encounter and independently examined this patient on 2/11/2023, as outlined below:  PHYSICAL EXAM:  General: WD, WN. Alert, cooperative, no acute distress    EENT:  EOMI. Anicteric sclerae. MMM  Resp:  CTA bilaterally, no wheezing or rales. No accessory muscle use  CV:  Regular  rhythm,  No edema  GI:  Soft, Non distended, Non tender. +Bowel sounds  Neurologic:  Alert and oriented X 3, normal speech, poor  on right hand  Psych:   Good insight. Not anxious nor agitated  Skin:  No rashes. No jaundice    Reviewed most current lab test results and cultures  YES  Reviewed most current radiology test results   YES  Review and summation of old records today    NO  Reviewed patient's current orders and MAR    YES  PMH/ reviewed - no change compared to H&P  ________________________________________________________________________  Care Plan discussed with:    Comments   Patient x    Family      RN x    Care Manager     Consultant                        Multidiciplinary team rounds were held today with , nursing, pharmacist and clinical coordinator. Patient's plan of care was discussed; medications were reviewed and discharge planning was addressed. ________________________________________________________________________  Total NON critical care TIME:  34   Minutes    Total CRITICAL CARE TIME Spent:   Minutes non procedure based      Comments   >50% of visit spent in counseling and coordination of care     ________________________________________________________________________  Jana Hernandes MD     Procedures: see electronic medical records for all procedures/Xrays and details which were not copied into this note but were reviewed prior to creation of Plan. LABS:  I reviewed today's most current labs and imaging studies.   Pertinent labs include:  Recent Labs 02/11/23  0307 02/10/23  1104   WBC 4.5 5.2   HGB 11.8 12.1   HCT 38.0 38.7    228     Recent Labs     02/11/23  0307 02/10/23  1104    140   K 4.2 3.4*    104   CO2 32 31   GLU 94 100   BUN 18 17   CREA 0.97 0.98   CA 9.9 9.3   ALB  --  3.5   TBILI  --  0.3   ALT  --  14   INR  --  1.0       Signed: Lorena Crespo MD

## 2023-02-11 NOTE — PROGRESS NOTES
Speech Pathology Note    Reviewed chart and note patient admitted with right arm weakness/numbness/tingling with concern for CVA. Note CT showed \"no acute infarct or other acute findings\" and MRI showed \"multiple, tiny, acute infarctions in the left MCA territory. \" Note patient passed the [facility] screen and a regular diet was ordered. NIHSS=0. Discussed case with RN/PT who reported no SLP-related concerns. Introduced self and role of SLP to patient. Patient denied any decline in motor speech, language, cognitive, or swallowing function, and patient informally observed drinking thin liquids with no difficulty. Patient participated in conversation appropriately and observed to be intelligible at the conversation level. Patient Ox4. Formal SLP evaluation not clinically indicated at this time. Will sign off. Please re-consult if further needs arise. Thank you.     Lucia Cortez M.S., CCC-SLP

## 2023-02-11 NOTE — PROGRESS NOTES
Was contacted by radiologist about multiple tiny acute CVAs in LMCA territory on MRI. I reviewed patient's records, she is on appropriate CVA therapy - ASA, Plavix, HD Lipitor. I reviewed Neuro note from earlier - no further mgmt if lacunar infarct, which this is. At this time, nothing further to do.   Will allow Neuro to follow up in the AM

## 2023-02-11 NOTE — PROGRESS NOTES
Date of Consultation:  February 11, 2023    Referring Physician: Smiley Downing MD    Reason for Consultation: Strokes, right hand clumsiness    Chief Complaint   Patient presents with    Extremity Weakness     Pt noticed three days ago when she woke up unable to use the right arm or right hand. Woke up with it feeling like it was asleep. Pt takes a 81mg aspirin daily. Denies h/o stroke        History of Present Illness:   Jorge Handley is a 80 y.o. female with history of hypertension, hyperlipidemia, history of smoking presenting with 3-day history of right hand clumsiness and difficulty with coordination, with MRI finding of multiple tiny acute infarcts in the left MCA territory which correlates with patient's clinical findings on exam.  Initial head CT and CTA head and neck unremarkable. TTE is pending. Hemoglobin A1c and lipid panel pending. Interval events:  Patient is doing well without any new neurological symptoms. She does feel that her hand strength is improving however she still has difficulty with coordinating movements such as holding a spoon or fork.     Past Medical History:   Diagnosis Date    Arthritis     back    Cataract     Hypertension     Vertigo         Past Surgical History:   Procedure Laterality Date    COLONOSCOPY N/A 2/5/2019    COLONOSCOPY performed by Ashlie Baez MD at [facility] ENDOSCOPY    COLONOSCOPY,REMV Heber Loach  11/24/2015         COLORECTAL SCRN; HI RISK IND  11/24/2015         COLORECTAL SCRN; HI RISK IND  2/5/2019         HX HYSTERECTOMY      HX OTHER SURGICAL Left 2008    Fatty tumor removed L shoulder    HI COLSC FLX W/RMVL OF TUMOR POLYP LESION SNARE TQ  6/8/2010             Family History   Problem Relation Age of Onset    Diabetes Mother     Heart Disease Mother 61    Hypertension Mother     Hypertension Father     Arthritis-rheumatoid Sister     Cancer Sister         stomach    Hypertension Sister     Arthritis-rheumatoid Brother     Hypertension Brother Stroke Sister     Kidney Disease Brother     Cancer Daughter         colon        Social History     Tobacco Use    Smoking status: Never    Smokeless tobacco: Never   Substance Use Topics    Alcohol use: No        No Known Allergies     Prior to Admission medications    Medication Sig Start Date End Date Taking? Authorizing Provider   doxazosin (CARDURA) 1 mg tablet Take 1 Tablet by mouth daily. 12/2/22   India Horner MD   cholecalciferol (VITAMIN D3) (1000 Units /25 mcg) tablet Take 2 Tablets by mouth daily. 12/2/22   India Horner MD   amLODIPine (NORVASC) 10 mg tablet Take 1 Tablet by mouth daily. 11/2/22   India Horner MD   lisinopril-hydroCHLOROthiazide Viralfino Ormond, ZESTORETIC) 20-12.5 mg per tablet 2 qd 11/2/22   India Horner MD   metoprolol succinate (TOPROL-XL) 50 mg XL tablet Take 1 Tablet by mouth daily. 11/2/22   India Horner MD   gabapentin (NEURONTIN) 100 mg capsule Take 100 mg by mouth once as needed for Pain. Provider, Historical   gabapentin (NEURONTIN) 300 mg capsule Take 300 mg by mouth three (3) times daily as needed. Provider, Historical   timolol (TIMOPTIC) 0.5 % ophthalmic solution Administer 1 Drop to both eyes daily. 8/16/16   Provider, Historical   MULTIVITAMINS W-MINERALS/LUT (CENTRUM SILVER PO) Take 1 Tab by mouth daily. Provider, Historical   CALCIUM CARBONATE (CALTRATE 600 PO) Take 1 Tab by mouth two (2) times a day.     Provider, Historical       Review of Systems:    General, constitutional: negative  Eyes, vision: negative  Ears, nose, throat: negative  Cardiovascular, heart: negative  Respiratory: negative  Gastrointestinal: negative  Genitourinary: negative  Musculoskeletal: negative  Skin and integumentary: negative  Psychiatric: negative  Endocrine: negative  Neurological: negative, except for HPI  Hematologic/lymphatic: negative  Allergy/immunology: negative    []Unable to obtain  ROS due to  []mental status change  []sedated   []intubated      PHYSICAL EXAMINATION: Visit Vitals  BP (!) 139/92   Pulse (!) 57   Temp 97.9 °F (36.6 °C)   Resp 14   Ht 5' 2\" (1.575 m)   Wt 167 lb 1.7 oz (75.8 kg)   SpO2 98%   Breastfeeding No   BMI 30.56 kg/m²       Physical Exam:  General:  no acute distress  Neck: supple no mass   Lungs: Comfortable on room air  Heart: Well-perfused  Lower extremity: no edema    Neurological exam:  Mental Status: Awake, alert, oriented to person, place and time  Attention and Concentration: able to state the days of the week backwards   Speech and Language: No dysarthria. Able to name, repeat and follow commands   Fund of knowledge was preserved    Cranial nerves: II-XII:  Pupils equal and reactive, visual fields intact by confrontation   Extraocular movements intact, no evidence of nystagmus or ptosis   Facial sensation intact to light touch  Facial movements symmetric; no facial droop  Hearing intact to soft rub bilaterally   Shoulder shrug symmetric and strong   Tongue protrusion full and midline without fasciculation or atrophy    Motor:   Normal tone and Bulk   Drift: Right pronator drift  Finger tapping slower in the right hand and less coordinated compared with the left      Strength testing:   deltoid triceps biceps Wrist ext. Wrist flex. intrinsics   Right 5 5 5 5 5 4   Left 5 5 5 5 5 5      Hip flex. Hip ext. Knee ext. Knee flex Dorsi flex Plantar flex   Right  5 NT 5 5 5 5   Left  5 NT 5 5 5 5       Sensory:  Intact to light touch throughout     Reflexes:   Biceps Triceps  Brachiorad Patellar Achilles Plantar Hoffmans   Right  3 2 2 2 2 Down NT   Left  3 2 2 2 2 Down NT     Cerebellar testing: Dysmetria in the right hand with difficulty coordinating fine finger movements.       Data:     Lab Results   Component Value Date/Time    Hemoglobin A1c 5.9 (H) 11/02/2022 09:35 AM    Sodium 140 02/11/2023 03:07 AM    Potassium 4.2 02/11/2023 03:07 AM    Chloride 104 02/11/2023 03:07 AM    Glucose 94 02/11/2023 03:07 AM    BUN 18 02/11/2023 03:07 AM Creatinine 0.97 02/11/2023 03:07 AM    Calcium 9.9 02/11/2023 03:07 AM    WBC 4.5 02/11/2023 03:07 AM    HCT 38.0 02/11/2023 03:07 AM    HGB 11.8 02/11/2023 03:07 AM    PLATELET 781 77/21/5438 03:07 AM       Imaging:    Results from [place] encounter on 02/10/23    MRI BRAIN WO CONT    Narrative  BRAIN MRI WITHOUT CONTRAST: 2/10/2023 6:10 PM    INDICATION: Transient ischemic attack/Cerebrovascular accident. COMPARISON: Same day CT    TECHNIQUE: Images were obtained in multiple planes and sequences to emphasize  T1, T2, and T2* information. Diffusion-weighted images and ADC maps were also  obtained. FINDINGS: There are multiple, tiny, acute infarctions in the left frontal lobe,  with a few, tiny, acute infarctions in the left parietal and posterior temporal  lobes. Scattered periventricular and subcortical white matter signal  abnormalities are consistent with chronic small vessel ischemic disease. The  ventricles and sulci are appropriate for age. The main intracranial arterial  flow-voids are normal. No hemorrhage. Impression  Multiple, tiny, acute infarctions in the left MCA territory. The findings were conveyed via secure electronic text message (Hailo) to  Victorina Hernandez NP on 2/10/2023 11:20 PM by Dr. Hitesh Romero. [id number]      Results from Hospital Encounter encounter on 02/10/23    CT PERF W CBF    Narrative  CLINICAL HISTORY: Code Stroke    EXAMINATION:  CT ANGIOGRAPHY HEAD AND NECK, CT PERFUSION    COMPARISON: None    TECHNIQUE:  Following the uneventful administration of iodinated contrast  material, axial CT angiography of the head and neck was performed. Delayed axial  images through the head were also obtained. Coronal and sagittal reconstructions  were obtained. Manual postprocessing of images was performed. 3-D  Sagittal  maximal intensity projection images were obtained. 3-D Coronal maximal  intensity projections were obtained.  CT brain perfusion was performed with  generation of hemodynamic maps of multiple parameters, including cerebral blood  flow, cerebral blood volume, mean transit time, and TMAX. CT dose reduction was  achieved through use of a standardized protocol tailored for this examination  and automatic exposure control for dose modulation. This study was analyzed by the [address] ai algorithm. FINDINGS:    DELAYED ENHANCEMENT HEAD CT  No intra or extra-axial mass or collection. Ventricles are normal in size and  configuration. The basal cisterns are patent. Dural venous sinuses are patent. No abnormal parenchymal or meningeal  enhancement. Mastoid air cells and paranasal sinuses are clear. CTA NECK:  Great vessels: Normal arch anatomy with the origins patent. Right subclavian artery: Patent  Left subclavian artery: Patent  Right common carotid artery: Patent  Left common carotid artery: Patent  Cervical right internal carotid artery: Patent with no significant stenosis by  NASCET criteria. Cervical left internal carotid artery: Patent with no significant stenosis by  NASCET criteria. Right vertebral artery: Patent  Left vertebral artery: Patent  Partially visualized cluster of 5 mm nodules in the right upper lobe (series 301  image 1). The thyroid is homogeneous. No cervical lymphadenopathy. Measurements utilize NASCET criteria. CTA HEAD:  Right cavernous internal carotid artery: Patent  Left cavernous internal carotid artery: Patent  Anterior cerebral arteries: Patent  Anterior communicating artery: Patent  Right middle cerebral artery: Patent  Left middle cerebral artery: Patent  Posterior communicating arteries: Patent  Posterior cerebral arteries: Patent, fetal right PCA  Basilar artery: Patent  Distal vertebral arteries: Patent  No evidence for intracranial aneurysm or hemodynamically significant stenosis. CT Perfusion:  Normal CT perfusion. Impression  1. No large vessel occlusion or hemodynamically significant stenosis. No  perfusion abnormality. 2.  Partially visualized pulmonary nodules in the right upper lobe measuring at  least 5 mm. Recommend chest CT for further workup. IMPRESSION/RECOMMENDATIONS:  Adrien Gutierrez is a 80 y.o. female who presents with right hand clumsiness, \"clumsy hand syndrome\", found to have acute multiple tiny left MCA territory infarcts on brain MRI suggestive of small vessel disease. Initial head CT, CTA head and neck unremarkable. TTE pending. Stroke: Etiology secondary to small vessel disease  - Recommend maintaining SBP <220/120 for 24 hrs from symptom onset and then BP goal is less than 140/90 (this is the long term goal)   - would recommend to continue aspirin 81mg daily for secondary stroke prevention with the addition of Plavix 75 mg daily for 21 days in combination, then aspirin monotherapy. - Risk factor modification: would recommend obtaining lipid panel and A1c    - if LDL > 70, would start atorvastatin 40mg daily    - please check hepatic panel prior to initiation of statin  - please obtain TTE to rule out intracardiac thrombus   - would monitor on telemetry to rule out arrhythmias    - please obtain PT/OT and speech consultations; patient will need aggressive PT and OT in the outpatient setting  - Neurology follow-up in 6 to 8 weeks. We discussed extensively the importance of lifestyle modification including smoking cessation, diet, and incorporating exercise into daily routine. Thank you very much for this consultation. Neurology will sign off at this time. I spent 50 minutes providing care to this acutely ill inpatient with > 50% of the time counseling as well as reviewing the patient's chart, notes, labs, medications and preparing documentation along with assisting in the coordination of care of the patient on the patient's hospital floor/unit.        Dilcia Gaspar DO

## 2023-02-11 NOTE — PROGRESS NOTES
PHYSICAL THERAPY EVALUATION WITH DISCHARGE  Patient: Christian Mayer (15 y.o. female)  Date: 2/11/2023  Primary Diagnosis: TIA (transient ischemic attack) [G45.9]  CVA (cerebral vascular accident) ([facility] [address] [I63.9]       Precautions: Standard      ASSESSMENT  Based on the objective data described below, the patient presents with right hand weakness, decreased AROM and coordination along with decreased higher level standing dynamic balance. Patient reports improvements in RUE deficits since admission and demonstrates good safety awareness and compensation for impaired balance (single leg tasks). She demonstrated independence with in room mobility and safe to continue with OOB activity. Patient safe to return home when medically stable and recommend outpatient PT and OT. Functional Outcome Measure: The patient scored Total: 46/56 on the Clements Balance Assessment which is indicative of low fall risk. Other factors to consider for discharge: supportive family      Further skilled acute physical therapy is not indicated at this time. PLAN :  Recommendation for discharge: (in order for the patient to meet his/her long term goals)  Outpatient physical therapy follow up recommended for balance training, and OT for hand deficits     This discharge recommendation:  Has not yet been discussed the attending provider and/or case management    IF patient discharges home will need the following DME: none       SUBJECTIVE:   Patient stated It is getting better.     OBJECTIVE DATA SUMMARY:   HISTORY:    Past Medical History:   Diagnosis Date    Arthritis     back    Cataract     Hypertension     Vertigo      Past Surgical History:   Procedure Laterality Date    COLONOSCOPY N/A 2/5/2019    COLONOSCOPY performed by Aj Camargo MD at \[facility]\"" ENDOSCOPY    COLONOSCOPY,JESSICA Mendez  11/24/2015         COLORECTAL SCRN; HI RISK IND  11/24/2015         COLORECTAL SCRN; HI RISK IND  2/5/2019         HX HYSTERECTOMY HX OTHER SURGICAL Left 2008    Fatty tumor removed L shoulder    OK COLSC FLX W/RMVL OF TUMOR POLYP LESION SNARE TQ  6/8/2010            Prior level of function: prior independence, lives with daughter but did not requires assistance at baseline. Drives and works part time. Personal factors and/or comorbidities impacting plan of care:     Home Situation  Home Environment: Private residence  # Steps to Enter: 4  Rails to Enter: Yes  Hand Rails : Bilateral  One/Two Story Residence: Two story, live on 1st floor  # of Interior Steps: 5  Living Alone: No  Support Systems: Child(belen)  Patient Expects to be Discharged to[de-identified] Home  Current DME Used/Available at Home: [address], [address], straight, Shower chair, Raised toilet seat  Tub or Shower Type: Tub    EXAMINATION/PRESENTATION/DECISION MAKING:   Critical Behavior:  Neurologic State: Alert  Orientation Level: Oriented X4  Cognition: Follows commands  Safety/Judgement: Fall prevention, Awareness of environment  Hearing:   Auditory  Auditory Impairment: None  Range Of Motion:  AROM: Generally decreased, functional (right hand, WFL elsewhere)                       Strength:    Strength: Generally decreased, functional (right hand, WFL elsewhere)                    Tone & Sensation:   Tone: Normal              Sensation: Intact               Coordination:  Coordination: Generally decreased, functional (right hand, WFL elsewhere)  Vision:   Acuity: Within Defined Limits  Corrective Lenses: Reading glasses  Functional Mobility:  Bed Mobility:  Rolling: Independent  Supine to Sit: Independent     Scooting: Independent  Transfers:  Sit to Stand: Independent  Stand to Sit: Independent        Bed to Chair: Independent              Balance:   Sitting: Intact  Standing: Impaired  Standing - Static: Good  Standing - Dynamic : Good;Fair (Fair with higher level balance tasks)  Ambulation/Gait Training:  Distance (ft): 50 Feet (ft)     Ambulation - Level of Assistance: Independent Steady, no LOB and no need for AD. Stairs:   Reviewed use of hand rail for balance safety. No need to formally assess. Functional Measure  Clements Balance Test:    Sitting to Standin  Standing Unsupported: 4  Sitting with Back Unsupported: 4  Standing to Sittin  Transfers: 4  Standing Unsupported with Eyes Closed: 4  Standing Unsupported with Feet Together: 3  Reach Forward with Outstretched Arm: 4   Object: 4  Turn to Look Over Shoulders: 4  Turn 360 Degrees: 4  Alternate Foot on Step/Stool: 1 (requires hand held support)  Standing Unsupported One Foot in Front: 1  Stand on One Le  Total: 46/56       56=Maximum possible score;   0-20=High fall risk  21-40=Moderate fall risk   41-56=Low fall risk        Physical Therapy Evaluation Charge Determination   History Examination Presentation Decision-Making   MEDIUM  Complexity : 1-2 comorbidities / personal factors will impact the outcome/ POC  MEDIUM Complexity : 3 Standardized tests and measures addressing body structure, function, activity limitation and / or participation in recreation  LOW Complexity : Stable, uncomplicated  Other outcome measures Clements Balance  MEDIUM      Based on the above components, the patient evaluation is determined to be of the following complexity level: LOW     Pain Rating:  No c/o pain     Activity Tolerance:   Good    After treatment patient left in no apparent distress:   Sitting in chair, Call bell within reach, and Bed / chair alarm activated    COMMUNICATION/EDUCATION:   The patients plan of care was discussed with: Occupational therapist and Registered nurse. Patient was educated regarding Her deficit(s) of right hand weakness as this relates to Her diagnosis of CVA. She demonstrated Good understanding as evidenced by verbalization. Patient and/or family was verbally educated on the BE FAST acronym for signs/symptoms of CVA and TIA.  BE FAST was written on patient's communication board  for visual education and reinforcement. All questions answered with patient indicating good understanding. Fall prevention education was provided and the patient/caregiver indicated understanding.     Thank you for this referral.  Avelino Camacho, PT, DPT   Time Calculation: 22 mins

## 2023-02-11 NOTE — PROGRESS NOTES
End of Shift Note    Bedside shift change report given to Diana RN (oncoming nurse) by Lenny Benedict RN (offgoing nurse). Report included the following information SBAR, Kardex, MAR, Recent Results, and Cardiac Rhythm      Shift worked:  Days   Shift summary and any significant changes:     No significant changes. Patient did not complain of pain and showed no signs of distress. Patient sat up in the chair most of the day. CT done today. Concerns for physician to address:  None   Zone phone for oncoming shift:   [phone]     Patient Information  Chicas Car  80 y.o.  2/10/2023  1:28 PM by Tolu Keys MD. Chicas Car was admitted from Home    Problem List  Patient Active Problem List    Diagnosis Date Noted    CVA (cerebral vascular accident) ([facility] [address] 02/11/2023    TIA (transient ischemic attack) 02/10/2023    Bilateral carotid artery stenosis 02/10/2023    Thrombotic stroke involving left middle cerebral artery ([facility] [address] 02/10/2023    Prediabetes 08/30/2016    BPPV (benign paroxysmal positional vertigo) 03/19/2016    Osteopenia 01/12/2016    DDD (degenerative disc disease), lumbar 02/28/2014    Essential hypertension, benign 02/21/2010    Osteoporosis 02/21/2010    Depression 02/21/2010     Past Medical History:   Diagnosis Date    Arthritis     back    Cataract     Hypertension     Vertigo        Core Measures:  CVA: Yes Yes  CHF:No No  PNA:No No    Activity:  Activity Level: Up with Assistance  Number times ambulated in hallways past shift: 0  Number of times OOB to chair past shift: 1    Cardiac:   Cardiac Monitoring: Yes      Cardiac Rhythm: Sinus Rhythm    Access:   Current line(s): PIV   Central Line? No   PICC LINE? No     Genitourinary:   Urinary status: voiding  Urinary Catheter?  No     Respiratory:   O2 Device: None (Room air)  Chronic home O2 use?: NO  Incentive spirometer at bedside: NO       GI:  Last Bowel Movement Date: 02/09/23  Current diet:  ADULT DIET Regular; Low Fat/Low Chol/High Fiber/CHERRY  Passing flatus: YES  Tolerating current diet: YES       Pain Management:   Patient states pain is manageable on current regimen: YES    Skin:  Gio Score: 20  Interventions: increase time out of bed and PT/OT consult    Patient Safety:  Fall Score:    Interventions: bed/chair alarm, assistive device (walker, cane, etc), gripper socks, pt to call before getting OOB, and stay with me (per policy)         DVT prophylaxis:  DVT prophylaxis Med- Yes  DVT prophylaxis SCD or JALYN- No     Wounds: (If Applicable)  Wounds- No      Active Consults:  IP CONSULT TO NEUROLOGY    Length of Stay:  Expected LOS: - - -  Actual LOS: 0  Discharge Plan: Yes ALE Finney RN

## 2023-02-11 NOTE — PROGRESS NOTES
Problem: Self Care Deficits Care Plan (Adult)  Goal: *Acute Goals and Plan of Care (Insert Text)  Description: FUNCTIONAL STATUS PRIOR TO ADMISSION: Patient was independent and active without use of DME. Works part time in clerical work    HOME SUPPORT: Lives with daughter who works,     Occupational Therapy Goals  Initiated 2/11/2023   1. Patient will perform lower body dressing with modified independence within 7 day(s). 2.  Patient will perform feeding with modified independence within 7 day(s). 3.  Patient will perform all ADLs with good integration of R UE into tasks with modified independence within 7 day(s). 4.  Patient will perform toilet transfers with modified independence within 7 day(s). 5.  Patient will participate in Right upper extremity therapeutic exercise/activities with modified independence within 7 day(s). 6.  Patient will improve their Fugl August score by 5 points in prep for ADLs within 7 days. Outcome: Not Met     OCCUPATIONAL THERAPY EVALUATION  Patient: Silvano Nichols (2 y.o. female)  Date: 2/11/2023  Primary Diagnosis: TIA (transient ischemic attack) [G45.9]  CVA (cerebral vascular accident) [facility]) [I63.9]       Precautions:   Fall    ASSESSMENT  Based on the objective data described below, the patient presents with decreased I with self care tasks due to decreased R hand coordination/dexterity, decreased R hand strength, and decreased B hand integration during ADL tasks following admission for CVA with 3 day history of hand weakness. Per imaging, pt now with multiple tiny acute infarct in L MCA. Pt overall ADL performance is presently at supervision to stand by assistance for ADLs due to decreased use of R dominant UE. Pt does endorse that her R UE symptoms have improved and is now able to feed self with R hand without spillage. Provided pt with stroke education as well as R UE integration/coordination, strengthening exercises to complete on own.   Pt indicated understanding of education and is in agreement to complete on own. Will continue to follow to maximize functional return. Pt will likely benefit from outpt neuro OT to address hand deficits. Current Level of Function Impacting Discharge (ADLs/self-care): stand by assist to independent, needs cues to integrate R hand    Functional Outcome Measure: The patient scored Total A-D  Total A-D (Motor Function): 55/66 on the Fugl-August Assessment which is indicative of mild impairment in upper extremity functional status. Other factors to consider for discharge: has supportive daughter     Patient will benefit from skilled therapy intervention to address the above noted impairments. PLAN :  Recommendations and Planned Interventions: self care training, therapeutic exercise, therapeutic activities, endurance activities, neuromuscular re-education, patient education, and home safety training    Frequency/Duration: Patient will be followed by occupational therapy 3 times a week to address goals. Recommendation for discharge: (in order for the patient to meet his/her long term goals)  Outpatient occupational therapy follow up recommended for R hand, neuro OT    This discharge recommendation:  Has not yet been discussed the attending provider and/or case management    IF patient discharges home will need the following DME: none       SUBJECTIVE:   Patient stated My hand is better today.     OBJECTIVE DATA SUMMARY:   HISTORY:   Past Medical History:   Diagnosis Date    Arthritis     back    Cataract     Hypertension     Vertigo      Past Surgical History:   Procedure Laterality Date    COLONOSCOPY N/A 2/5/2019    COLONOSCOPY performed by Fidelina Ahn MD at [address]  11/24/2015         COLORECTAL SCRN; HI RISK IND  11/24/2015         COLORECTAL SCRN; HI RISK IND  2/5/2019         HX HYSTERECTOMY      HX OTHER SURGICAL Left 2008    Fatty tumor removed L shoulder    AK COLSC FLX W/RMVL OF TUMOR POLYP LESION SNARE TQ  6/8/2010          Expanded or extensive additional review of patient history:     Home Situation  Home Environment: Private residence  # Steps to Enter: 4  Rails to Enter: Yes  Hand Rails : Bilateral  One/Two Story Residence: Two story, live on 1st floor  # of Interior Steps: 5  Living Alone: No  Support Systems: Child(belen)  Patient Expects to be Discharged to[de-identified] Home  Current DME Used/Available at Home: Cane, straight, Shower chair, Raised toilet seat  Tub or Shower Type: Tub    Hand dominance: Right    EXAMINATION OF PERFORMANCE DEFICITS:  Cognitive/Behavioral Status:  Neurologic State: Alert  Orientation Level: Oriented X4  Cognition: Follows commands; Appropriate decision making; Appropriate for age attention/concentration; Appropriate safety awareness  Perception: Appears intact  Perseveration: No perseveration noted  Safety/Judgement: Fall prevention; Awareness of environment    Skin: UE intact,     Edema: UE intact    Hearing:   Auditory  Auditory Impairment: None    Vision/Perceptual:                           Acuity: Within Defined Limits    Corrective Lenses: Reading glasses    Range of Motion:  Synergistic extension in R digits, decreased opposition, coordination, and intrinsic control of R digits  AROM: Generally decreased, functional (right hand, WFL elsewhere)                         Strength:    Strength: Generally decreased, functional (right hand, WFL elsewhere)                Coordination:  Coordination: Generally decreased, functional (right hand, WFL elsewhere)  Fine Motor Skills-Upper: Right Impaired;Left Intact    Gross Motor Skills-Upper: Right Impaired;Left Intact    Tone & Sensation:    Tone: Normal  Sensation: Intact                      Balance:  Sitting: Intact  Standing: Impaired  Standing - Static: Good  Standing - Dynamic : Good;Fair (Fair with higher level balance tasks)    Functional Mobility and Transfers for ADLs:  Bed Mobility:  Rolling: Independent  Supine to Sit: Independent  Scooting: Independent    Transfers:  Sit to Stand: Independent  Stand to Sit: Independent  Bed to Chair: Independent  Bathroom Mobility: Stand-by assistance  Toilet Transfer : Stand-by assistance  Tub Transfer: Stand-by assistance  Assistive Device : Gait Belt    ADL Assessment:  Feeding: Supervision    Oral Facial Hygiene/Grooming: Supervision    Bathing: Supervision    Type of Bath: Chlorhexidine (CHG); Bath Pack    Upper Body Dressing: Supervision    Lower Body Dressing: Supervision    Toileting: Supervision                ADL Intervention and task modifications:  Feeding  Feeding Assistance: Supervision  Container Management: Supervision; Compensatory technique training;Training to use affected extremity as a fine motor assistance  Utensil Management: Set-up; Compensatory technique training;Training to use affected extremity as a fine motor assistance  Food to Mouth: Supervision; Compensatory technique training;Training to use affected extremity as a fine motor assistance  Drink to Mouth: Supervision; Compensatory technique training;Training to use affected extremity as a fine motor assistance  Cues: Verbal cues provided;Visual cues provided  Adaptive Equipment:  (pt reports better grasp on utensil today, deffered use of built up spoon)    Grooming  Grooming Assistance: Supervision  Position Performed: Standing  Washing Hands: Supervision; Compensatory technique training;Training to use affected extremity as a fine motor assistance  Cues: Verbal cues provided;Visual cues provided         Type of Bath: Chlorhexidine (CHG); Bath Pack              Lower Body Dressing Assistance  Dressing Assistance: Supervision  Socks: Supervision; Compensatory technique training;Training to use affected extremity as a fine motor assistance  Leg Crossed Method Used: Yes  Position Performed: Seated edge of bed  Cues: Don;Doff;Verbal cues provided;Visual cues provided Cognitive Retraining  Safety/Judgement: Fall prevention; Awareness of environment    Therapeutic Exercise:  Reviewed R hand coordination exercises, pt indicated understanding of all education. Functional Measure:  Fugl-August Assessment of Motor Recovery after Stroke:        Reflex Activity  Flexors/Biceps/Fingers: Can be elicited  Extensors/Triceps: Can be elicited  Reflex Subtotal: 4    Volitional Movement Within Synergies  Shoulder Retraction: Full  Shoulder Elevation: Full  Shoulder Abduction (90 degrees): Full  Shoulder External Rotation: Full  Elbow Flexion: Full  Forearm Supination: Full  Shoulder Adduction/Internal Rotation: Full  Elbow Extension: Full  Forearm Pronation: Full  Subtotal: 18    Volitional Movement Mixing Synergies  Hand to Lumbar Spine: Full  Shoulder Flexion (0-90 degrees): Full  Pronation-Supination: Full  Subtotal: 6    Volitional Movement With Little or No Synergy  Shoulder Abduction (0-90 degrees): Full  Shoulder Flexion ( degrees): Full  Pronation/Supination: Full  Subtotal : 6    Normal Reflex Activity  Biceps, Triceps, Finger Flexors: Full  Subtotal : 2    Upper Extremity Total   Upper Extremity Total: 36    Wrist  Stability at 15 Degree Dorsiflexion: Partial  Repeated Dorsiflexion/ Volar Flexion: Partial  Stability at 15 Degree Dorsiflexion: Partial  Repeated Dorsiflexion/ Volar Flexion: Partial  Circumduction: Partial  Wrist Total: 5    Hand  Mass Flexion: Partial  Mass Extension: Full  Grasp A: None  Grasp B: Full  Grasp C: Full  Grasp D: Full  Grasp E: Partial  Hand Total: 10    Coordination/Speed  Tremor: None  Dysmetria: Slight  Time: 2-5s  Coordination/Speed Total : 4    Total A-D  Total A-D (Motor Function): 55/66     This is a reliable/valid measure of arm function after a neurological event. It has established value to characterize functional status and for measuring spontaneous and therapy-induced recovery; tests proximal and distal motor functions.  Fugl-August Assessment - UE scores recorded between five and 30 days post neurologic event can be used to predict UE recovery at six months post neurologic event. Severe = 0-21 points   Moderately Severe = 22-33 points   Moderate = 34-47 points   Mild = 48-66 points  LAISHA Walker, FRANCISCO JAVIER Dominguez, & GRAY Enriquez (1992). Measurement of motor recovery after stroke: Outcome assessment and sample size requirements. Stroke, 23, pp. 9767-5682.   ------------------------------------------------------------------------------------------------------------------------------------------------------------------  MCID:  Stroke:   Bautista Huston et al, 2001; n = 171; mean age 79 (6) years; assessed within 16 (12) days of stroke, Acute Stroke)  FMA Motor Scores from Admission to Discharge   10 point increase in FMA Upper Extremity = 1.5 change in discharge FIM   10 point increase in FMA Lower Extremity = 1.9 change in discharge FIM  MDC:   Stroke:   Tamera White et al, 2008, n = 14, mean age = 59.9 (14.6) years, assessed on average 14 (6.5) months post stroke, Chronic Stroke)   FMA = 5.2 points for the Upper Extremity portion of the assessment     Occupational Therapy Evaluation Charge Determination   History Examination Decision-Making   MEDIUM Complexity : Expanded review of history including physical, cognitive and psychosocial  history  MEDIUM Complexity : 3-5 performance deficits relating to physical, cognitive , or psychosocial skils that result in activity limitations and / or participation restrictions MEDIUM Complexity : Patient may present with comorbidities that affect occupational performnce.  Miniml to moderate modification of tasks or assistance (eg, physical or verbal ) with assesment(s) is necessary to enable patient to complete evaluation       Based on the above components, the patient evaluation is determined to be of the following complexity level: MEDIUM  Pain Rating:  No c.o pain during session    Activity Tolerance:   Good    After treatment patient left in no apparent distress:    Sitting in chair, Call bell within reach, Bed / chair alarm activated, and RN aware    COMMUNICATION/EDUCATION:   The patients plan of care was discussed with: Physical therapist and Registered nurse. Patient was educated regarding her deficit(s) of R UE as this relates to her diagnosis of CVA. She demonstrated Good understanding. Patient and/or family was verbally educated on the BE FAST acronym for signs/symptoms of CVA and TIA. BE FAST was written on patient's communication board  for visual education and reinforcement. All questions answered with patient indicating good understanding. Home safety education was provided and the patient/caregiver indicated understanding., Patient/family have participated as able in goal setting and plan of care. , and Patient/family agree to work toward stated goals and plan of care. This patients plan of care is appropriate for delegation to \[facility]\"".     Thank you for this referral.  Tino Ward, OTR/L  Time Calculation: 27 mins

## 2023-02-12 LAB
ALBUMIN SERPL-MCNC: 3.1 G/DL (ref 3.5–5)
ALBUMIN/GLOB SERPL: 0.9 (ref 1.1–2.2)
ALP SERPL-CCNC: 53 U/L (ref 45–117)
ALT SERPL-CCNC: 13 U/L (ref 12–78)
AST SERPL-CCNC: 9 U/L (ref 15–37)
ATRIAL RATE: 73 BPM
BILIRUB DIRECT SERPL-MCNC: 0.1 MG/DL (ref 0–0.2)
BILIRUB SERPL-MCNC: 0.4 MG/DL (ref 0.2–1)
CALCULATED P AXIS, ECG09: 63 DEGREES
CALCULATED R AXIS, ECG10: -4 DEGREES
CALCULATED T AXIS, ECG11: 43 DEGREES
DIAGNOSIS, 93000: NORMAL
GLOBULIN SER CALC-MCNC: 3.6 G/DL (ref 2–4)
P-R INTERVAL, ECG05: 160 MS
PROT SERPL-MCNC: 6.7 G/DL (ref 6.4–8.2)
Q-T INTERVAL, ECG07: 376 MS
QRS DURATION, ECG06: 88 MS
QTC CALCULATION (BEZET), ECG08: 414 MS
VENTRICULAR RATE, ECG03: 73 BPM

## 2023-02-12 PROCEDURE — 74011250637 HC RX REV CODE- 250/637: Performed by: INTERNAL MEDICINE

## 2023-02-12 PROCEDURE — 74011250636 HC RX REV CODE- 250/636: Performed by: INTERNAL MEDICINE

## 2023-02-12 PROCEDURE — 36415 COLL VENOUS BLD VENIPUNCTURE: CPT

## 2023-02-12 PROCEDURE — 80076 HEPATIC FUNCTION PANEL: CPT

## 2023-02-12 PROCEDURE — 65270000046 HC RM TELEMETRY

## 2023-02-12 PROCEDURE — 74011250637 HC RX REV CODE- 250/637: Performed by: PSYCHIATRY & NEUROLOGY

## 2023-02-12 RX ADMIN — CHOLECALCIFEROL TAB 25 MCG (1000 UNIT) 2000 UNITS: 25 TAB at 09:05

## 2023-02-12 RX ADMIN — ASPIRIN 81 MG: 81 TABLET, CHEWABLE ORAL at 09:05

## 2023-02-12 RX ADMIN — ACETAMINOPHEN 325MG 650 MG: 325 TABLET ORAL at 18:31

## 2023-02-12 RX ADMIN — GABAPENTIN 300 MG: 300 CAPSULE ORAL at 09:05

## 2023-02-12 RX ADMIN — GABAPENTIN 300 MG: 300 CAPSULE ORAL at 17:37

## 2023-02-12 RX ADMIN — DOXAZOSIN 1 MG: 2 TABLET ORAL at 09:05

## 2023-02-12 RX ADMIN — Medication 1 TABLET: at 09:05

## 2023-02-12 RX ADMIN — HEPARIN SODIUM 5000 UNITS: 5000 INJECTION INTRAVENOUS; SUBCUTANEOUS at 07:45

## 2023-02-12 RX ADMIN — HEPARIN SODIUM 5000 UNITS: 5000 INJECTION INTRAVENOUS; SUBCUTANEOUS at 14:00

## 2023-02-12 RX ADMIN — ATORVASTATIN CALCIUM 40 MG: 40 TABLET, FILM COATED ORAL at 21:17

## 2023-02-12 RX ADMIN — GABAPENTIN 300 MG: 300 CAPSULE ORAL at 21:17

## 2023-02-12 RX ADMIN — METOPROLOL SUCCINATE 50 MG: 50 TABLET, EXTENDED RELEASE ORAL at 09:05

## 2023-02-12 RX ADMIN — HYDROCHLOROTHIAZIDE: 12.5 CAPSULE ORAL at 09:05

## 2023-02-12 RX ADMIN — CALCIUM 500 MG: 500 TABLET ORAL at 09:05

## 2023-02-12 RX ADMIN — CALCIUM 500 MG: 500 TABLET ORAL at 17:37

## 2023-02-12 RX ADMIN — AMLODIPINE BESYLATE 10 MG: 5 TABLET ORAL at 09:05

## 2023-02-12 RX ADMIN — CLOPIDOGREL BISULFATE 75 MG: 75 TABLET ORAL at 09:05

## 2023-02-12 RX ADMIN — TIMOLOL MALEATE 1 DROP: 5 SOLUTION OPHTHALMIC at 09:06

## 2023-02-12 RX ADMIN — HEPARIN SODIUM 5000 UNITS: 5000 INJECTION INTRAVENOUS; SUBCUTANEOUS at 21:16

## 2023-02-12 NOTE — PROGRESS NOTES
Transition of Care Plan:    RUR: 7% low   Disposition: Home with daughter - pt is recommended to have OP PT/OT, will need script   Follow up appointments: PCP - Pt requests to set her own PCP follow up per her daughter's schedule   DME needed: None   Transportation at Discharge: Daughter  Daniela Phipps or means to access home:  Daughter has access      IM Medicare Letter: 2nd IMM printed today 2/12 and left at bedside. Pt elected to sign when her daughter is present   Is patient a [place] and connected with the [place]? N/a               If yes, was [employer] transfer form completed and [facility] notified? Caregiver Contact: Daughter   Discharge Caregiver contacted prior to discharge? Pt to contact   Care Conference needed?:                     Reason for Admission:  TIA                     RUR Score:     7% low                 Plan for utilizing home health:   No recommendations for home health        PCP: First and Last name:  Cyrus Lyons MD     Name of Practice:    Are you a current patient: Yes/No: Yes   Approximate date of last visit: Month ago   Can you participate in a virtual visit with your PCP: No                    Current Advanced Directive/Advance Care Plan: Full Code      Healthcare Decision Maker:              Primary Decision Maker: Alla Vee - Other Relative - [id number]    Secondary Decision Maker: Roly Andres - Other Relative - [phone]    Supplemental (Other) Decision Maker: obed alcocer - Son - [phone]                  Transition of Care Plan:      Home with family, outpatient follow up, OP PT/OT    Initial note: CM reviewed chart. CM completed assessment with pt at bedside. CM introduced self, role of CM, verified demographics, and discussed transition of care planning. Pt resides in 2 level home with 4 TREVON with her daughter. Daughter helps with dressing and transportation needs, pt ind with other ADLS/IADLS. No DME used. Daughter to transport home.  IMM letter left at bedside today, pt requests to sign when daughter is present. Care management will continue to be available to assist as transition of care planning needs arise. Care Management Interventions  PCP Verified by CM: Yes  Mode of Transport at Discharge:  Other (see comment) (Daughter)  Transition of Care Consult (CM Consult): Discharge Planning  Discharge Durable Medical Equipment: No  Physical Therapy Consult: Yes  Occupational Therapy Consult: Yes  Speech Therapy Consult: Yes  Support Systems: Child(belen)  Confirm Follow Up Transport: Family  The Patient and/or Patient Representative was Provided with a Choice of Provider and Agrees with the Discharge Plan?: Yes  Discharge Location  Patient Expects to be Discharged to[de-identified] Home with outpatient services      Alea Holley [facility], [address]

## 2023-02-12 NOTE — PROGRESS NOTES
End of Shift Note    Bedside shift change report given to Jenny DISLA (oncoming nurse) by Trina Camilo RN (offgoing nurse). Report included the following information SBAR, Kardex, MAR, Recent Results, and Cardiac Rhythm      Shift worked:  7p-7a   Shift summary and any significant changes:     No significant changes. Patient did not complain of pain and showed no signs of distress. Patient refused bed alarm:    Is the patient alert and oriented Yes     Is patient education documented on risk/injury related to falls Patient verbalizes Yes     Voices understanding of education provided Yes        Concerns for physician to address:  None   Zone phone for oncoming shift:   [phone]     Patient Information  Emmett Ledesma  80 y.o.  2/10/2023  1:28 PM by Huber Ortiz MD. Emmett Ledesma was admitted from Home    Problem List  Patient Active Problem List    Diagnosis Date Noted    CVA (cerebral vascular accident) ([facility] [address] 02/11/2023    TIA (transient ischemic attack) 02/10/2023    Bilateral carotid artery stenosis 02/10/2023    Thrombotic stroke involving left middle cerebral artery ([facility] [address] 02/10/2023    Prediabetes 08/30/2016    BPPV (benign paroxysmal positional vertigo) 03/19/2016    Osteopenia 01/12/2016    DDD (degenerative disc disease), lumbar 02/28/2014    Essential hypertension, benign 02/21/2010    Osteoporosis 02/21/2010    Depression 02/21/2010     Past Medical History:   Diagnosis Date    Arthritis     back    Cataract     Hypertension     Vertigo        Core Measures:  CVA: Yes Yes  CHF:No No  PNA:No No    Activity:  Activity Level: Up with Assistance  Number times ambulated in hallways past shift: 0  Number of times OOB to chair past shift: 1    Cardiac:   Cardiac Monitoring: Yes      Cardiac Rhythm: Sinus Rhythm    Access:   Current line(s): PIV   Central Line? No   PICC LINE? No     Genitourinary:   Urinary status: voiding  Urinary Catheter?  No     Respiratory:   O2 Device: None (Room air)  Chronic home O2 use?: NO  Incentive spirometer at bedside: NO       GI:  Last Bowel Movement Date: 02/09/23  Current diet:  ADULT DIET Regular; Low Fat/Low Chol/High Fiber/CHERRY  Passing flatus: YES  Tolerating current diet: YES       Pain Management:   Patient states pain is manageable on current regimen: YES    Skin:  Gio Score: 20  Interventions: increase time out of bed and PT/OT consult    Patient Safety:  Fall Score:    Interventions: bed/chair alarm, assistive device (walker, cane, etc), gripper socks, pt to call before getting OOB, and stay with me (per policy)         DVT prophylaxis:  DVT prophylaxis Med- Yes  DVT prophylaxis SCD or JALYN- No     Wounds: (If Applicable)  Wounds- No      Active Consults:  IP CONSULT TO NEUROLOGY    Length of Stay:  Expected LOS: - - -  Actual LOS: 1  Discharge Plan: Yes ALE Chowdhury RN

## 2023-02-12 NOTE — PROGRESS NOTES
Hospitalist Progress Note    NAME: Brad Ortiz   :  1940   MRN:  [id number]       Assessment / Plan:  Acute CVA, left MCA territory  Right clumsy hand syndrome    -MRI brain positive for multiple acute infarct in left MCA territory  -Likely etiology small vessel disease/HTN  -CT head and CTA head and neck without LVO  -Continue aspirin 81 mg daily, reports compliance  -Continue added Plavix 75 mg daily, for 21 days  -.4, A1c 5.8  -Atorvastatin 40 mg daily  -Neurology on board  -Limited echo pending    HTN  -Continue metoprolol, lisinopril, hydrochlorothiazide, doxazosin    Lung nodule, 5 mm, CT chest reviewed with the patient, has small nodules, recommend CT in 6 months      30.0 - 39.9 Obese / Body mass index is 30.56 kg/m². Estimated discharge date:   Barriers: Echocardiogram    Code status: Full  Prophylaxis: Hep SQ  Recommended Disposition: Home w/Family     Subjective:     Chief Complaint / Reason for Physician Visit  No events overnight. Right hand still clumsy  Review of Systems:  Symptom Y/N Comments  Symptom Y/N Comments   Fever/Chills    Chest Pain     Poor Appetite    Edema     Cough    Abdominal Pain     Sputum    Joint Pain     SOB/ALVAREZ    Pruritis/Rash     Nausea/vomit    Tolerating PT/OT     Diarrhea    Tolerating Diet     Constipation    Other       Could NOT obtain due to:      Objective:     VITALS:   Last 24hrs VS reviewed since prior progress note.  Most recent are:  Patient Vitals for the past 24 hrs:   Temp Pulse Resp BP SpO2   23 0725 97.5 °F (36.4 °C) 62 16 130/75 97 %   23 0325 97.7 °F (36.5 °C) 67 18 (!) 158/98 98 %   23 2256 97.3 °F (36.3 °C) (!) 54 20 (!) 157/111 97 %   23 1921 97.7 °F (36.5 °C) 65 18 112/71 99 %   23 1507 97.7 °F (36.5 °C) 74 18 98/66 98 %   23 1121 98.4 °F (36.9 °C) 75 20 119/87 97 %   23 1034 -- 68 -- -- --       No intake or output data in the 24 hours ending 23 1017     I had a face to face encounter and independently examined this patient on 2/12/2023, as outlined below:  PHYSICAL EXAM:  General: WD, WN. Alert, cooperative, no acute distress    EENT:  EOMI. Anicteric sclerae. MMM  Resp:  CTA bilaterally, no wheezing or rales. No accessory muscle use  CV:  Regular  rhythm,  No edema  GI:  Soft, Non distended, Non tender. +Bowel sounds  Neurologic:  Alert and oriented X 3, normal speech, poor  on right hand  Psych:   Good insight. Not anxious nor agitated  Skin:  No rashes. No jaundice    Reviewed most current lab test results and cultures  YES  Reviewed most current radiology test results   YES  Review and summation of old records today    NO  Reviewed patient's current orders and MAR    YES  PMH/SH reviewed - no change compared to H&P  ________________________________________________________________________  Care Plan discussed with:    Comments   Patient x    Family      RN x    Care Manager     Consultant                        Multidiciplinary team rounds were held today with , nursing, pharmacist and clinical coordinator. Patient's plan of care was discussed; medications were reviewed and discharge planning was addressed. ________________________________________________________________________  Total NON critical care TIME:  34   Minutes    Total CRITICAL CARE TIME Spent:   Minutes non procedure based      Comments   >50% of visit spent in counseling and coordination of care     ________________________________________________________________________  Jonna Rush MD     Procedures: see electronic medical records for all procedures/Xrays and details which were not copied into this note but were reviewed prior to creation of Plan. LABS:  I reviewed today's most current labs and imaging studies.   Pertinent labs include:  Recent Labs     02/11/23  0307 02/10/23  1104   WBC 4.5 5.2   HGB 11.8 12.1   HCT 38.0 38.7    228       Recent Labs 02/12/23  0304 02/11/23  0307 02/10/23  1104   NA  --  140 140   K  --  4.2 3.4*   CL  --  104 104   CO2  --  32 31   GLU  --  94 100   BUN  --  18 17   CREA  --  0.97 0.98   CA  --  9.9 9.3   ALB 3.1*  --  3.5   TBILI 0.4  --  0.3   ALT 13  --  14   INR  --   --  1.0         Signed: Cl Kennedy MD

## 2023-02-12 NOTE — PROGRESS NOTES
End of Shift Note     Bedside shift change report given to Diana RN (oncoming nurse) by Alicia Winn RN (offgoing nurse). Report included the following information SBAR, Kardex, MAR, Recent Results, and Cardiac Rhythm       Shift worked:  Days   Shift summary and any significant changes:      No significant changes. Patient did not complain of pain. Concerns for physician to address:  Patient waiting for echo   Zone phone for oncoming shift:   [phone]      Patient Information  Bayron Leos  80 y.o.  2/10/2023  1:28 PM by Cl Kennedy MD. Bayron Leos was admitted from Home     Problem List       Patient Active Problem List     Diagnosis Date Noted    CVA (cerebral vascular accident) ([facility] [address] 02/11/2023    TIA (transient ischemic attack) 02/10/2023    Bilateral carotid artery stenosis 02/10/2023    Thrombotic stroke involving left middle cerebral artery ([facility] [address] 02/10/2023    Prediabetes 08/30/2016    BPPV (benign paroxysmal positional vertigo) 03/19/2016    Osteopenia 01/12/2016    DDD (degenerative disc disease), lumbar 02/28/2014    Essential hypertension, benign 02/21/2010    Osteoporosis 02/21/2010    Depression 02/21/2010           Past Medical History:   Diagnosis Date    Arthritis       back    Cataract      Hypertension      Vertigo           Core Measures:  CVA: Yes Yes  CHF:No No  PNA:No No     Activity:  Activity Level: Up with Assistance  Number times ambulated in hallways past shift: 0  Number of times OOB to chair past shift: 1     Cardiac:   Cardiac Monitoring: Yes      Cardiac Rhythm: Sinus Rhythm     Access:   Current line(s): PIV   Central Line? No   PICC LINE? No      Genitourinary:   Urinary status: voiding  Urinary Catheter?  No      Respiratory:   O2 Device: None (Room air)  Chronic home O2 use?: NO  Incentive spirometer at bedside: NO     GI:  Last Bowel Movement Date: 02/09/23  Current diet:  ADULT DIET Regular; Low Fat/Low Chol/High Fiber/CHERRY  Passing flatus: YES  Tolerating current diet: YES     Pain Management:   Patient states pain is manageable on current regimen: YES     Skin:  Gio Score: 20  Interventions: increase time out of bed and PT/OT consult    Patient Safety:  Fall Score:    Interventions: bed/chair alarm, assistive device (walker, cane, etc), gripper socks, pt to call before getting OOB, and stay with me (per policy)        DVT prophylaxis:  DVT prophylaxis Med- Yes  DVT prophylaxis SCD or JALYN- No      Wounds: (If Applicable)  Wounds- No        Active Consults:  IP CONSULT TO NEUROLOGY     Length of Stay:  Expected LOS: - - -  Actual LOS: 2  Discharge Plan: Yes Home after echocardiogram is completed.         Karoline Guevara RN

## 2023-02-13 ENCOUNTER — APPOINTMENT (OUTPATIENT)
Dept: NON INVASIVE DIAGNOSTICS | Age: 83
DRG: 066 | End: 2023-02-13
Attending: INTERNAL MEDICINE
Payer: MEDICARE

## 2023-02-13 LAB
ECHO AO ROOT DIAM: 2.5 CM
ECHO AO ROOT INDEX: 1.41 CM/M2
ECHO AV MEAN GRADIENT: 5 MMHG
ECHO AV MEAN VELOCITY: 1.1 M/S
ECHO AV PEAK GRADIENT: 6 MMHG
ECHO AV PEAK VELOCITY: 1.3 M/S
ECHO AV VTI: 26.3 CM
ECHO LA DIAMETER INDEX: 1.64 CM/M2
ECHO LA DIAMETER: 2.9 CM
ECHO LA TO AORTIC ROOT RATIO: 1.16
ECHO LV EDV A4C: 56 ML
ECHO LV EDV INDEX A4C: 32 ML/M2
ECHO LV EJECTION FRACTION A4C: 57 %
ECHO LV ESV A4C: 24 ML
ECHO LV ESV INDEX A4C: 14 ML/M2
ECHO LV FRACTIONAL SHORTENING: 36 % (ref 28–44)
ECHO LV INTERNAL DIMENSION DIASTOLE INDEX: 1.86 CM/M2
ECHO LV INTERNAL DIMENSION DIASTOLIC: 3.3 CM (ref 3.9–5.3)
ECHO LV INTERNAL DIMENSION SYSTOLIC INDEX: 1.19 CM/M2
ECHO LV INTERNAL DIMENSION SYSTOLIC: 2.1 CM
ECHO LV IVSD: 1.2 CM (ref 0.6–0.9)
ECHO LV MASS 2D: 124.8 G (ref 67–162)
ECHO LV MASS INDEX 2D: 70.5 G/M2 (ref 43–95)
ECHO LV POSTERIOR WALL DIASTOLIC: 1.2 CM (ref 0.6–0.9)
ECHO LV RELATIVE WALL THICKNESS RATIO: 0.73
ECHO LVOT AREA: 2.5 CM2
ECHO LVOT DIAM: 1.8 CM
ECHO MV MAX VELOCITY: 1 M/S
ECHO MV MEAN GRADIENT: 1 MMHG
ECHO MV MEAN VELOCITY: 0.6 M/S
ECHO MV PEAK GRADIENT: 4 MMHG
ECHO MV VTI: 22.8 CM
ECHO PULMONARY ARTERY END DIASTOLIC PRESSURE: 2 MMHG
ECHO PV MAX VELOCITY: 0.9 M/S
ECHO PV PEAK GRADIENT: 3 MMHG
ECHO PV REGURGITANT MAX VELOCITY: 0.8 M/S

## 2023-02-13 PROCEDURE — 74011250637 HC RX REV CODE- 250/637: Performed by: PSYCHIATRY & NEUROLOGY

## 2023-02-13 PROCEDURE — 74011250636 HC RX REV CODE- 250/636: Performed by: INTERNAL MEDICINE

## 2023-02-13 PROCEDURE — 74011250637 HC RX REV CODE- 250/637: Performed by: INTERNAL MEDICINE

## 2023-02-13 PROCEDURE — 93308 TTE F-UP OR LMTD: CPT

## 2023-02-13 PROCEDURE — 65270000046 HC RM TELEMETRY

## 2023-02-13 RX ORDER — GUAIFENESIN 100 MG/5ML
81 LIQUID (ML) ORAL DAILY
Qty: 30 TABLET | Refills: 0 | Status: SHIPPED | OUTPATIENT
Start: 2023-02-13 | End: 2023-03-15

## 2023-02-13 RX ORDER — METOPROLOL SUCCINATE 50 MG/1
25 TABLET, EXTENDED RELEASE ORAL DAILY
Qty: 90 TABLET | Refills: 1 | Status: SHIPPED | OUTPATIENT
Start: 2023-02-13 | End: 2023-02-16 | Stop reason: SDUPTHER

## 2023-02-13 RX ORDER — CLOPIDOGREL BISULFATE 75 MG/1
75 TABLET ORAL DAILY
Qty: 30 TABLET | Refills: 0 | Status: SHIPPED | OUTPATIENT
Start: 2023-02-13 | End: 2023-02-16 | Stop reason: SDUPTHER

## 2023-02-13 RX ORDER — METOPROLOL SUCCINATE 25 MG/1
25 TABLET, EXTENDED RELEASE ORAL DAILY
Status: DISCONTINUED | OUTPATIENT
Start: 2023-02-14 | End: 2023-02-14 | Stop reason: HOSPADM

## 2023-02-13 RX ORDER — AMLODIPINE BESYLATE 5 MG/1
5 TABLET ORAL DAILY
Status: DISCONTINUED | OUTPATIENT
Start: 2023-02-14 | End: 2023-02-14 | Stop reason: HOSPADM

## 2023-02-13 RX ORDER — AMLODIPINE BESYLATE 10 MG/1
5 TABLET ORAL DAILY
Qty: 15 TABLET | Refills: 0 | Status: SHIPPED
Start: 2023-02-13 | End: 2023-02-16 | Stop reason: DRUGHIGH

## 2023-02-13 RX ORDER — ATORVASTATIN CALCIUM 40 MG/1
40 TABLET, FILM COATED ORAL
Qty: 30 TABLET | Refills: 0 | Status: SHIPPED | OUTPATIENT
Start: 2023-02-13 | End: 2023-02-16 | Stop reason: SDUPTHER

## 2023-02-13 RX ADMIN — Medication 1 TABLET: at 08:32

## 2023-02-13 RX ADMIN — HEPARIN SODIUM 5000 UNITS: 5000 INJECTION INTRAVENOUS; SUBCUTANEOUS at 06:57

## 2023-02-13 RX ADMIN — GABAPENTIN 300 MG: 300 CAPSULE ORAL at 22:33

## 2023-02-13 RX ADMIN — HEPARIN SODIUM 5000 UNITS: 5000 INJECTION INTRAVENOUS; SUBCUTANEOUS at 14:00

## 2023-02-13 RX ADMIN — DOXAZOSIN 1 MG: 2 TABLET ORAL at 08:31

## 2023-02-13 RX ADMIN — CALCIUM 500 MG: 500 TABLET ORAL at 18:00

## 2023-02-13 RX ADMIN — ATORVASTATIN CALCIUM 40 MG: 40 TABLET, FILM COATED ORAL at 22:33

## 2023-02-13 RX ADMIN — ACETAMINOPHEN 325MG 650 MG: 325 TABLET ORAL at 08:31

## 2023-02-13 RX ADMIN — TIMOLOL MALEATE 1 DROP: 5 SOLUTION OPHTHALMIC at 12:16

## 2023-02-13 RX ADMIN — CLOPIDOGREL BISULFATE 75 MG: 75 TABLET ORAL at 08:32

## 2023-02-13 RX ADMIN — AMLODIPINE BESYLATE 10 MG: 5 TABLET ORAL at 08:33

## 2023-02-13 RX ADMIN — GABAPENTIN 300 MG: 300 CAPSULE ORAL at 09:00

## 2023-02-13 RX ADMIN — ASPIRIN 81 MG: 81 TABLET, CHEWABLE ORAL at 08:33

## 2023-02-13 RX ADMIN — METOPROLOL SUCCINATE 50 MG: 50 TABLET, EXTENDED RELEASE ORAL at 08:33

## 2023-02-13 RX ADMIN — HEPARIN SODIUM 5000 UNITS: 5000 INJECTION INTRAVENOUS; SUBCUTANEOUS at 22:33

## 2023-02-13 RX ADMIN — GABAPENTIN 300 MG: 300 CAPSULE ORAL at 16:48

## 2023-02-13 RX ADMIN — CHOLECALCIFEROL TAB 25 MCG (1000 UNIT) 2000 UNITS: 25 TAB at 08:33

## 2023-02-13 RX ADMIN — CALCIUM 500 MG: 500 TABLET ORAL at 08:33

## 2023-02-13 RX ADMIN — ACETAMINOPHEN 325MG 650 MG: 325 TABLET ORAL at 12:15

## 2023-02-13 RX ADMIN — HYDROCHLOROTHIAZIDE: 12.5 CAPSULE ORAL at 09:00

## 2023-02-13 NOTE — PROGRESS NOTES
End of Shift Note     Bedside shift change report given to NAIF Young (oncoming nurse) by Raquel Mayers RN (offgoing nurse). Report included the following information SBAR, Kardex, MAR, Recent Results, and Cardiac Rhythm       Shift worked: 7a-7p   Shift summary and any significant changes:      No significant changes. Echo done late-patient's family had already left and she wants to stay until tomorrow. Concerns for physician to address: None   Zone phone for oncoming shift:   [phone]      Patient Information  Juve Escamilla  80 y.o.  2/10/2023  1:28 PM by Denice Leyva MD. Juve Escamilla was admitted from Home     Problem List          Patient Active Problem List     Diagnosis Date Noted    CVA (cerebral vascular accident) ([facility] [address] 02/11/2023    TIA (transient ischemic attack) 02/10/2023    Bilateral carotid artery stenosis 02/10/2023    Thrombotic stroke involving left middle cerebral artery ([facility] [address] 02/10/2023    Prediabetes 08/30/2016    BPPV (benign paroxysmal positional vertigo) 03/19/2016    Osteopenia 01/12/2016    DDD (degenerative disc disease), lumbar 02/28/2014    Essential hypertension, benign 02/21/2010    Osteoporosis 02/21/2010    Depression 02/21/2010              Past Medical History:   Diagnosis Date    Arthritis       back    Cataract      Hypertension      Vertigo           Core Measures:  CVA: Yes Yes  CHF:No No  PNA:No No     Activity:  Activity Level: Up with Assistance  Number times ambulated in hallways past shift: 0  Number of times OOB to chair past shift: 3     Cardiac:   Cardiac Monitoring: Yes      Cardiac Rhythm: Sinus Rhythm     Access:   Current line(s): PIV   Central Line? No   PICC LINE? No      Genitourinary:   Urinary status: voiding  Urinary Catheter?  No      Respiratory:   O2 Device: None (Room air)  Chronic home O2 use?: NO  Incentive spirometer at bedside: NO     GI:  Last Bowel Movement Date: 02/09/23  Current diet:  ADULT DIET Regular; Low Fat/Low Chol/High Fiber/CHERRY  Passing flatus: YES  Tolerating current diet: YES     Pain Management:   Patient states pain is manageable on current regimen: YES     Skin:  Gio Score: 20  Interventions: increase time out of bed and PT/OT consult    Patient Safety:  Fall Score:    Interventions: bed/chair alarm, assistive device (walker, cane, etc), gripper socks, pt to call before getting OOB, and stay with me (per policy)        DVT prophylaxis:  DVT prophylaxis Med- Yes  DVT prophylaxis SCD or JALYN- No      Wounds: (If Applicable)  Wounds- No        Active Consults:  IP CONSULT TO NEUROLOGY     Length of Stay:  Expected LOS: - - -  Actual LOS: 2  Discharge Plan: Yes Home 2/14/23

## 2023-02-13 NOTE — PROGRESS NOTES
End of Shift Note     Bedside shift change report given to Efrain Palencia RN (oncoming nurse) by Arturo Duarte RN (offgoing nurse). Report included the following information SBAR, Kardex, MAR, Recent Results, and Cardiac Rhythm       Shift worked:  DaysNights   Shift summary and any significant changes:      No significant changes. Patient did not complain of pain. Concerns for physician to address:  Patient waiting for echo   Zone phone for oncoming shift:   [phone]      Patient Information  Mayte Pearson  80 y.o.  2/10/2023  1:28 PM by Swapna Travis MD. Mayte Pearson was admitted from Home     Problem List       Patient Active Problem List     Diagnosis Date Noted    CVA (cerebral vascular accident) ([facility] [address] 02/11/2023    TIA (transient ischemic attack) 02/10/2023    Bilateral carotid artery stenosis 02/10/2023    Thrombotic stroke involving left middle cerebral artery ([facility] [address] 02/10/2023    Prediabetes 08/30/2016    BPPV (benign paroxysmal positional vertigo) 03/19/2016    Osteopenia 01/12/2016    DDD (degenerative disc disease), lumbar 02/28/2014    Essential hypertension, benign 02/21/2010    Osteoporosis 02/21/2010    Depression 02/21/2010           Past Medical History:   Diagnosis Date    Arthritis       back    Cataract      Hypertension      Vertigo           Core Measures:  CVA: Yes Yes  CHF:No No  PNA:No No     Activity:  Activity Level: Up with Assistance  Number times ambulated in hallways past shift: 0  Number of times OOB to chair past shift: 1     Cardiac:   Cardiac Monitoring: Yes      Cardiac Rhythm: Sinus Rhythm     Access:   Current line(s): PIV   Central Line? No   PICC LINE? No      Genitourinary:   Urinary status: voiding  Urinary Catheter?  No      Respiratory:   O2 Device: None (Room air)  Chronic home O2 use?: NO  Incentive spirometer at bedside: NO     GI:  Last Bowel Movement Date: 02/09/23  Current diet:  ADULT DIET Regular; Low Fat/Low Chol/High Fiber/CHERRY  Passing flatus: YES  Tolerating current diet: YES     Pain Management:   Patient states pain is manageable on current regimen: YES     Skin:  Gio Score: 20  Interventions: increase time out of bed and PT/OT consult    Patient Safety:  Fall Score:    Interventions: bed/chair alarm, assistive device (walker, cane, etc), gripper socks, pt to call before getting OOB, and stay with me (per policy)        DVT prophylaxis:  DVT prophylaxis Med- Yes  DVT prophylaxis SCD or JALYN- No      Wounds: (If Applicable)  Wounds- No        Active Consults:  IP CONSULT TO NEUROLOGY     Length of Stay:  Expected LOS: - - -  Actual LOS: 2  Discharge Plan: Yes Home after echocardiogram is completed.         Yury Aparicio RN

## 2023-02-13 NOTE — DISCHARGE INSTRUCTIONS
HOSPITALIST DISCHARGE INSTRUCTIONS    NAME: Carol Hobson   :  1940   MRN:  [id number]     Date/Time:  2023 7:47 AM    ADMIT DATE: 2/10/2023   DISCHARGE DATE: 2023     Acute CVA, left MCA territory  Right clumsy hand syndrome  HTN  Lung nodule, 6 mm    It is important that you take the medication exactly as they are prescribed. Keep your medication in the bottles provided by the pharmacist and keep a list of the medication names, dosages, and times to be taken in your wallet. Do not take other medications without consulting your doctor. What to do at Home    Recommended diet:  Cardiac Diet    Recommended activity: Activity as tolerated. No driving for 6 months      If you have questions regarding the hospital related prescriptions or hospital related issues please call [place] office at . You can always direct your questions to your primary care doctor if you are unable to reach your hospital physician; your PCP works as an extension of your hospital doctor just like your hospital doctor is an extension of your PCP for your time at the hospital [facility], [facility])    If you experience any of the following symptoms then please call your primary care physician or return to the emergency room if you cannot get hold of your doctor:    Fever, chills, nausea, vomiting, or persistent diarrhea  Worsening weakness or new problems with your speech or balance  Dark stools or visible blood in your stools  New Leg swelling or shortness of breath as these could be signs of a clot    Additional Instructions:      Bring these papers with you to your follow up appointments. The papers will help your doctors be sure to continue the care plan from the hospital.              Information obtained by :  I understand that if any problems occur once I am at home I am to contact my physician. I understand and acknowledge receipt of the instructions indicated above. Physician's or R.N.'s Signature                                                                  Date/Time                                                                                                                                              Patient or Representative Signature

## 2023-02-13 NOTE — PROGRESS NOTES
Transition of Care Plan:     RUR: 7% low     MAISHA: 2/13  ECHO needs to be done inpatient per MD  Family close by and can transport once cleared for DC. Disposition: Home with daughter with script for OP Rehab PT/OT. 11:19 AM  Per Rounds, ECHO does need to be done inpatient per MD.    8:31 AM  CM completed chart review. - pt is recommended to have OP PT/OT, will need script   -RN is checking to see if there is anything else that needs to be done prior to DC.   -Pathfinder: Barrier needs Echo. . RN is checking. Follow up appointments: PCP and Neuro- Pt requests to set her own PCP follow up per her daughter's schedule   SMARTER tool on  door  DME needed: None   Transportation at Discharge: Daughter should be able to transport before noon. Keys or means to access home:  Daughter has access      IM Medicare Letter: 2nd IMM printed today 2/12 and left at bedside. Pt elected to sign when her daughter is present   Is patient a [employer] and connected with the [employer]? N/a               If yes, was [employer] transfer form completed and [facility] notified? Caregiver Contact: Daughter - Tyson Naik  Discharge Caregiver contacted prior to discharge? Yes  Care Conference needed? No    No further CM needs. Care Management Interventions  PCP Verified by CM: Yes  Mode of Transport at Discharge:  Other (see comment) (Daughter)  Transition of Care Consult (CM Consult): Discharge Planning  Discharge Durable Medical Equipment: No  Physical Therapy Consult: Yes  Occupational Therapy Consult: Yes  Speech Therapy Consult: Yes  Support Systems: Child(belen)  Confirm Follow Up Transport: Family  The Patient and/or Patient Representative was Provided with a Choice of Provider and Agrees with the Discharge Plan?: Yes  Discharge Location  Patient Expects to be Discharged to[de-identified] Home with outpatient services    Carolynn Cat

## 2023-02-13 NOTE — PROGRESS NOTES
Pharmacist Discharge Medication Reconciliation      Significant PMH:   Past Medical History:   Diagnosis Date    Arthritis     back    Cataract     Hypertension     Vertigo      Encounter Diagnoses:   Encounter Diagnosis   Name Primary? Right arm weakness Yes     Allergies: Patient has no known allergies. Admission date: 2/10/2023     Discharge Medications:   Current Discharge Medication List        START taking these medications    Details   clopidogreL (PLAVIX) 75 mg tab Take 1 Tablet by mouth daily for 30 days. Qty: 30 Tablet, Refills: 0  Start date: 2/13/2023, End date: 3/15/2023      aspirin 81 mg chewable tablet Take 1 Tablet by mouth daily for 30 days. Qty: 30 Tablet, Refills: 0  Start date: 2/13/2023, End date: 3/15/2023      atorvastatin (LIPITOR) 40 mg tablet Take 1 Tablet by mouth nightly for 30 days. Qty: 30 Tablet, Refills: 0  Start date: 2/13/2023, End date: 3/15/2023      OTHER Outpatient occupation Therapy- Evalv and treat  For acute CVA  Qty: 1 Each, Refills: 0  Start date: 2/13/2023           CONTINUE these medications which have NOT CHANGED    Details   doxazosin (CARDURA) 1 mg tablet Take 1 Tablet by mouth daily. Qty: 90 Tablet, Refills: 3      cholecalciferol (VITAMIN D3) (1000 Units /25 mcg) tablet Take 2 Tablets by mouth daily. Qty: 60 Tablet, Refills: 0      amLODIPine (NORVASC) 10 mg tablet Take 1 Tablet by mouth daily. Qty: 90 Tablet, Refills: 3      lisinopril-hydroCHLOROthiazide (PRINZIDE, ZESTORETIC) 20-12.5 mg per tablet 2 qd  Qty: 180 Tablet, Refills: 3      metoprolol succinate (TOPROL-XL) 50 mg XL tablet Take 1 Tablet by mouth daily. Qty: 90 Tablet, Refills: 3      timolol (TIMOPTIC) 0.5 % ophthalmic solution Administer 1 Drop to both eyes daily. MULTIVITAMINS W-MINERALS/LUT (CENTRUM SILVER PO) Take 1 Tab by mouth daily. CALCIUM CARBONATE (CALTRATE 600 PO) Take 1 Tab by mouth two (2) times a day.       !! gabapentin (NEURONTIN) 100 mg capsule Take 100 mg by mouth once as needed for Pain. !! gabapentin (NEURONTIN) 300 mg capsule Take 300 mg by mouth three (3) times daily as needed. !! - Potential duplicate medications found. Please discuss with provider. The patient's chart, MAR and AVS were reviewed by Sharmin June RPH.     Discharging Provider: Geovanna López MD     Thank you,     Sharmin June, [facility]

## 2023-02-13 NOTE — DISCHARGE SUMMARY
Hospitalist Discharge Summary     Patient ID:  Xi Moreno  [id number]  9 y.o.  1940  2/10/2023    PCP on record: Anuel Lara MD    Admit date: 2/10/2023  Discharge date and time: 2/13/2023    DISCHARGE DIAGNOSIS:    Acute CVA, left MCA territory  Right clumsy hand syndrome  HTN  Lung nodule, 6 mm       CONSULTATIONS:  IP CONSULT TO NEUROLOGY    Excerpted HPI from H&P of Dr Fernando Middleton: 59-year-old female with past medical history of multiple comorbidities presents to [facility] with complaints of right arm weakness/numbness/tingling. Patient states she was in her usual state of health until 3 days back when she start experiencing sudden onset persistent right arm weakness/numbness/tingling as well as a difficulty in using her hand, as there was no improvement in her symptomatology patient presented to the ED. Patient denies any fevers chills nausea vomiting lightheadedness dizziness dyspnea orthopnea paroxysmal nocturnal dyspnea chest pain palpitations headache auditory or visual symptoms abdominal stool or urinary complaints or any other associated symptoms. Patient endorses no recent sick contacts or travel activity       ______________________________________________________________________  DISCHARGE SUMMARY/HOSPITAL COURSE:  for full details see H&P, daily progress notes, labs, consult notes.        Acute CVA, left MCA territory  Right clumsy hand syndrome     -MRI brain positive for multiple acute infarct in left MCA territory  -Likely etiology small vessel disease/HTN  -CT head and CTA head and neck without LVO  -Continue aspirin 81 mg daily, reports compliance  -Continue added Plavix 75 mg daily, for 21 days  -.4, A1c 5.8  -Atorvastatin 40 mg daily  -Neurology on board  -Limited echo pending  -Outpatient Occupational Therapy     HTN  -Blood pressure controlled with her home medication, continue     Lung nodule, 6 mm, CT chest reviewed with the patient, multiple nonspecific nodules measuring up to 6 mm . Recommends repeat CT chest in 6 months. Advised patient to discuss with PCP to repeat in 6 months, she understands it  -  Patient will be discharged today, after she had echocardiogram done      _______________________________________________________________________  Patient seen and examined by me on discharge day. Pertinent Findings:  Gen:    Not in distress  Chest: Clear lungs  CVS:   Regular rhythm. No edema  Abd:  Soft, not distended, not tender  Neuro:  Alert,   _______________________________________________________________________  DISCHARGE MEDICATIONS:   Current Discharge Medication List        START taking these medications    Details   clopidogreL (PLAVIX) 75 mg tab Take 1 Tablet by mouth daily for 30 days. Qty: 30 Tablet, Refills: 0  Start date: 2/13/2023, End date: 3/15/2023      aspirin 81 mg chewable tablet Take 1 Tablet by mouth daily for 30 days. Qty: 30 Tablet, Refills: 0  Start date: 2/13/2023, End date: 3/15/2023      atorvastatin (LIPITOR) 40 mg tablet Take 1 Tablet by mouth nightly for 30 days. Qty: 30 Tablet, Refills: 0  Start date: 2/13/2023, End date: 3/15/2023      OTHER Outpatient occupation Therapy- Evalv and treat  For acute CVA  Qty: 1 Each, Refills: 0  Start date: 2/13/2023           CONTINUE these medications which have NOT CHANGED    Details   doxazosin (CARDURA) 1 mg tablet Take 1 Tablet by mouth daily. Qty: 90 Tablet, Refills: 3      cholecalciferol (VITAMIN D3) (1000 Units /25 mcg) tablet Take 2 Tablets by mouth daily. Qty: 60 Tablet, Refills: 0      amLODIPine (NORVASC) 10 mg tablet Take 1 Tablet by mouth daily. Qty: 90 Tablet, Refills: 3      lisinopril-hydroCHLOROthiazide (PRINZIDE, ZESTORETIC) 20-12.5 mg per tablet 2 qd  Qty: 180 Tablet, Refills: 3      metoprolol succinate (TOPROL-XL) 50 mg XL tablet Take 1 Tablet by mouth daily.   Qty: 90 Tablet, Refills: 3      timolol (TIMOPTIC) 0.5 % ophthalmic solution Administer 1 Drop to both eyes daily. MULTIVITAMINS W-MINERALS/LUT (CENTRUM SILVER PO) Take 1 Tab by mouth daily. CALCIUM CARBONATE (CALTRATE 600 PO) Take 1 Tab by mouth two (2) times a day. !! gabapentin (NEURONTIN) 100 mg capsule Take 100 mg by mouth once as needed for Pain. !! gabapentin (NEURONTIN) 300 mg capsule Take 300 mg by mouth three (3) times daily as needed. !! - Potential duplicate medications found. Please discuss with provider. Patient Follow Up Instructions:    Activity: Activity as tolerated  Diet: Cardiac Diet  Wound Care: None needed        Follow-up Information       Follow up With Specialties Details Why Contact Info    Dory Daley MD Internal Medicine Physician  Repeat CT Chest 6 Months [address]  [facility]  [phone]      Patricia Padgett MD Neurology Schedule an appointment as soon as possible for a visit in 4 week(s)  [address]  [facility]  [phone]            ________________________________________________________________    Risk of deterioration: Low    Condition at Discharge:  Stable  __________________________________________________________________    Disposition  Home with family, no needs    ____________________________________________________________________    Code Status: Full Code  ___________________________________________________________________      Total time in minutes spent coordinating this discharge (includes going over instructions, follow-up, prescriptions, and preparing report for sign off to her PCP) :  35 minutes    Signed:  Tolu Keys MD

## 2023-02-14 ENCOUNTER — TELEPHONE (OUTPATIENT)
Dept: INTERNAL MEDICINE CLINIC | Age: 83
End: 2023-02-14

## 2023-02-14 VITALS
HEART RATE: 65 BPM | RESPIRATION RATE: 10 BRPM | HEIGHT: 62 IN | OXYGEN SATURATION: 98 % | DIASTOLIC BLOOD PRESSURE: 81 MMHG | SYSTOLIC BLOOD PRESSURE: 127 MMHG | BODY MASS INDEX: 30.73 KG/M2 | TEMPERATURE: 98.1 F | WEIGHT: 167 LBS

## 2023-02-14 PROCEDURE — 74011250636 HC RX REV CODE- 250/636: Performed by: INTERNAL MEDICINE

## 2023-02-14 PROCEDURE — 93005 ELECTROCARDIOGRAM TRACING: CPT

## 2023-02-14 PROCEDURE — 74011250637 HC RX REV CODE- 250/637: Performed by: INTERNAL MEDICINE

## 2023-02-14 RX ADMIN — HEPARIN SODIUM 5000 UNITS: 5000 INJECTION INTRAVENOUS; SUBCUTANEOUS at 05:46

## 2023-02-14 RX ADMIN — ACETAMINOPHEN 325MG 650 MG: 325 TABLET ORAL at 05:48

## 2023-02-14 NOTE — PROGRESS NOTES
End of Shift Note     Bedside shift change report given to Ирина Doty RN (oncoming nurse) by Anna Lei RN (offgoing nurse). Report included the following information SBAR, Kardex, MAR, Recent Results, and Cardiac Rhythm       Shift worked: 7p-7a   Shift summary and any significant changes:      No significant changes. Echo done late-patient's family had already left and she wants to stay until tomorrow. Concerns for physician to address: None   Zone phone for oncoming shift:   [phone]      Patient Information  Ivan Malcolm  80 y.o.  2/10/2023  1:28 PM by Red Hauser MD. Ivan Malcolm was admitted from Home     Problem List          Patient Active Problem List     Diagnosis Date Noted    CVA (cerebral vascular accident) ([facility] [address] 02/11/2023    TIA (transient ischemic attack) 02/10/2023    Bilateral carotid artery stenosis 02/10/2023    Thrombotic stroke involving left middle cerebral artery ([facility] [address] 02/10/2023    Prediabetes 08/30/2016    BPPV (benign paroxysmal positional vertigo) 03/19/2016    Osteopenia 01/12/2016    DDD (degenerative disc disease), lumbar 02/28/2014    Essential hypertension, benign 02/21/2010    Osteoporosis 02/21/2010    Depression 02/21/2010              Past Medical History:   Diagnosis Date    Arthritis       back    Cataract      Hypertension      Vertigo           Core Measures:  CVA: Yes Yes  CHF:No No  PNA:No No     Activity:  Activity Level: Up with Assistance  Number times ambulated in hallways past shift: 0  Number of times OOB to chair past shift: 3     Cardiac:   Cardiac Monitoring: Yes      Cardiac Rhythm: Sinus Rhythm     Access:   Current line(s): PIV   Central Line? No   PICC LINE? No      Genitourinary:   Urinary status: voiding  Urinary Catheter?  No      Respiratory:   O2 Device: None (Room air)  Chronic home O2 use?: NO  Incentive spirometer at bedside: NO     GI:  Last Bowel Movement Date: 02/09/23  Current diet:  ADULT DIET Regular; Low Fat/Low Chol/High Fiber/CHERRY  Passing flatus: YES  Tolerating current diet: YES     Pain Management:   Patient states pain is manageable on current regimen: YES     Skin:  Gio Score: 20  Interventions: increase time out of bed and PT/OT consult    Patient Safety:  Fall Score:    Interventions: bed/chair alarm, assistive device (walker, cane, etc), gripper socks, pt to call before getting OOB, and stay with me (per policy)        DVT prophylaxis:  DVT prophylaxis Med- Yes  DVT prophylaxis SCD or JALYN- No      Wounds: (If Applicable)  Wounds- No        Active Consults:  IP CONSULT TO NEUROLOGY     Length of Stay:  Expected LOS: - - -  Actual LOS: 2  Discharge Plan: Yes Home 2/14/23

## 2023-02-14 NOTE — PROGRESS NOTES
Transition of Care Plan:     RUR: 7% low      MAISHA: 2/14     Disposition: Home with daughter with script for OP Rehab PT/OT. 8:34 AM  Echo was done late last night. Plan to DC this morning. DTR is on the way to transport him home. Follow up appointments: PCP and Neuro- Pt requests to set her own PCP follow up per her daughter's schedule   SMARTER tool on  door  DME needed: None   Transportation at Discharge: Daughter should be able to transport before noon. Keys or means to access home:  Daughter has access      IM Medicare Letter: 2nd IMM printed today 2/12 and left at bedside. Pt elected to sign when her daughter is present   Is patient a  and connected with the [facility]? N/a               If yes, was [employer] transfer form completed and [facility] notified? Caregiver Contact: Daughter - Alma [place]  Discharge Caregiver contacted prior to discharge? Yes  Care Conference needed? No     No further CM needs. Care Management Interventions  PCP Verified by CM: Yes  Mode of Transport at Discharge:  Other (see comment) (Daughter)  Transition of Care Consult (CM Consult): Discharge Planning  Discharge Durable Medical Equipment: No  Physical Therapy Consult: Yes  Occupational Therapy Consult: Yes  Speech Therapy Consult: Yes  Support Systems: Child(belen)  Confirm Follow Up Transport: Family  The Patient and/or Patient Representative was Provided with a Choice of Provider and Agrees with the Discharge Plan?: Yes  Discharge Location  Patient Expects to be Discharged to[de-identified] Home with outpatient services     Carolynn Cat

## 2023-02-14 NOTE — TELEPHONE ENCOUNTER
Pt needs a hospital follow-up appt. No available appts with pcp for this psr to schedule in recommended time frame of within the week      Please call pt directly to schedule as soon as able: use MOBILE        DETAILS     Discharging:   Today 2/14  Diagnosis:   TIA  Facility:   [facility]

## 2023-02-14 NOTE — DISCHARGE SUMMARY
Hospitalist Discharge Summary     Patient ID:  Medardo Hinojosa  [id number]  68 y.o.  1940  2/10/2023    PCP on record: Alfie Franks MD    Admit date: 2/10/2023  Discharge date and time: 2/14/2023    DISCHARGE DIAGNOSIS:    Acute CVA, left MCA territory  Right clumsy hand syndrome  HTN  Lung nodule, 6 mm       CONSULTATIONS:  IP CONSULT TO NEUROLOGY    Excerpted HPI from H&P of Dr Shannan Castaneda: 80-year-old female with past medical history of multiple comorbidities presents to [facility] with complaints of right arm weakness/numbness/tingling. Patient states she was in her usual state of health until 3 days back when she start experiencing sudden onset persistent right arm weakness/numbness/tingling as well as a difficulty in using her hand, as there was no improvement in her symptomatology patient presented to the ED. Patient denies any fevers chills nausea vomiting lightheadedness dizziness dyspnea orthopnea paroxysmal nocturnal dyspnea chest pain palpitations headache auditory or visual symptoms abdominal stool or urinary complaints or any other associated symptoms. Patient endorses no recent sick contacts or travel activity       ______________________________________________________________________  DISCHARGE SUMMARY/HOSPITAL COURSE:  for full details see H&P, daily progress notes, labs, consult notes. Acute CVA, left MCA territory  Right clumsy hand syndrome     -MRI brain positive for multiple acute infarct in left MCA territory  -Likely etiology small vessel disease/HTN  -CT head and CTA head and neck without LVO  -Continue aspirin 81 mg daily, reports compliance  -Continue added Plavix 75 mg daily, for 21 days  -.4, A1c 5.8  -Atorvastatin 40 mg daily  -Neurology on board  Echocardiogram was which showed no PFO.   Had grade 1 diastolic dysfunction.  -Outpatient Occupational Therapy     HTN  -Blood pressure controlled with her home medication, continue     Lung nodule, 6 mm, CT chest reviewed with the patient, multiple nonspecific nodules measuring up to 6 mm . Recommends repeat CT chest in 6 months. Advised patient to discuss with PCP to repeat in 6 months, she understands it  -          _______________________________________________________________________  Patient seen and examined by me on discharge day. Pertinent Findings:  Gen:    Not in distress  Chest: Clear lungs  CVS:   Regular rhythm. No edema  Abd:  Soft, not distended, not tender  Neuro:  Alert,   _______________________________________________________________________  DISCHARGE MEDICATIONS:   Current Discharge Medication List        START taking these medications    Details   clopidogreL (PLAVIX) 75 mg tab Take 1 Tablet by mouth daily for 30 days. Qty: 30 Tablet, Refills: 0  Start date: 2/13/2023, End date: 3/15/2023      aspirin 81 mg chewable tablet Take 1 Tablet by mouth daily for 30 days. Qty: 30 Tablet, Refills: 0  Start date: 2/13/2023, End date: 3/15/2023      atorvastatin (LIPITOR) 40 mg tablet Take 1 Tablet by mouth nightly for 30 days. Qty: 30 Tablet, Refills: 0  Start date: 2/13/2023, End date: 3/15/2023      OTHER Outpatient occupation Therapy- Evalv and treat  For acute CVA  Qty: 1 Each, Refills: 0  Start date: 2/13/2023           CONTINUE these medications which have CHANGED    Details   amLODIPine (NORVASC) 10 mg tablet Take 0.5 Tablets by mouth daily for 30 days. Qty: 15 Tablet, Refills: 0  Start date: 2/13/2023, End date: 3/15/2023      metoprolol succinate (TOPROL-XL) 50 mg XL tablet Take 0.5 Tablets by mouth daily. Qty: 90 Tablet, Refills: 1  Start date: 2/13/2023           CONTINUE these medications which have NOT CHANGED    Details   doxazosin (CARDURA) 1 mg tablet Take 1 Tablet by mouth daily. Qty: 90 Tablet, Refills: 3      cholecalciferol (VITAMIN D3) (1000 Units /25 mcg) tablet Take 2 Tablets by mouth daily.   Qty: 60 Tablet, Refills: 0 lisinopril-hydroCHLOROthiazide (PRINZIDE, ZESTORETIC) 20-12.5 mg per tablet 2 qd  Qty: 180 Tablet, Refills: 3      timolol (TIMOPTIC) 0.5 % ophthalmic solution Administer 1 Drop to both eyes daily. MULTIVITAMINS W-MINERALS/LUT (CENTRUM SILVER PO) Take 1 Tab by mouth daily. CALCIUM CARBONATE (CALTRATE 600 PO) Take 1 Tab by mouth two (2) times a day. !! gabapentin (NEURONTIN) 100 mg capsule Take 100 mg by mouth once as needed for Pain. !! gabapentin (NEURONTIN) 300 mg capsule Take 300 mg by mouth three (3) times daily as needed. !! - Potential duplicate medications found. Please discuss with provider. Patient Follow Up Instructions: Activity: Activity as tolerated  Diet: Cardiac Diet  Wound Care: None needed        Follow-up Information       Follow up With Specialties Details Why Contact Info    Micheal Coronel MD Internal Medicine Physician Schedule an appointment as soon as possible for a visit in 1 week(s) Repeat CT Chest 6 Months  Pt prefers to schedule her own appt. Shashank 36      Kelin Lanier MD Neurology Schedule an appointment as soon as possible for a visit in 4 week(s) for Neurology follow up appt.    Office [address]  [phone]            ________________________________________________________________    Risk of deterioration: Low    Condition at Discharge:  Stable  __________________________________________________________________    Disposition  Home with family, no needs    ____________________________________________________________________    Code Status: Full Code  ___________________________________________________________________      Total time in minutes spent coordinating this discharge (includes going over instructions, follow-up, prescriptions, and preparing report for sign off to her PCP) :  35 minutes    Signed:  Cl Kennedy MD

## 2023-02-14 NOTE — PROGRESS NOTES
Patient discharged home with her daughter. All discharge instructions reviewed with patient who voices understanding. All personal belongings taken with patient.

## 2023-02-16 ENCOUNTER — OFFICE VISIT (OUTPATIENT)
Dept: INTERNAL MEDICINE CLINIC | Age: 83
End: 2023-02-16
Payer: MEDICARE

## 2023-02-16 VITALS
HEART RATE: 79 BPM | DIASTOLIC BLOOD PRESSURE: 60 MMHG | RESPIRATION RATE: 16 BRPM | HEIGHT: 62 IN | BODY MASS INDEX: 31.14 KG/M2 | SYSTOLIC BLOOD PRESSURE: 104 MMHG | OXYGEN SATURATION: 97 % | TEMPERATURE: 97.2 F | WEIGHT: 169.2 LBS

## 2023-02-16 DIAGNOSIS — R91.1 LUNG NODULE: ICD-10-CM

## 2023-02-16 DIAGNOSIS — I63.9 ACUTE CVA (CEREBROVASCULAR ACCIDENT) (HCC): Primary | ICD-10-CM

## 2023-02-16 PROCEDURE — G8427 DOCREV CUR MEDS BY ELIG CLIN: HCPCS | Performed by: INTERNAL MEDICINE

## 2023-02-16 PROCEDURE — 99496 TRANSJ CARE MGMT HIGH F2F 7D: CPT | Performed by: INTERNAL MEDICINE

## 2023-02-16 RX ORDER — AMLODIPINE BESYLATE 5 MG/1
5 TABLET ORAL DAILY
Qty: 90 TABLET | Refills: 3 | Status: SHIPPED | OUTPATIENT
Start: 2023-02-16

## 2023-02-16 RX ORDER — METOPROLOL SUCCINATE 25 MG/1
25 TABLET, EXTENDED RELEASE ORAL DAILY
Qty: 90 TABLET | Refills: 3 | Status: SHIPPED | OUTPATIENT
Start: 2023-02-16

## 2023-02-16 RX ORDER — CLOPIDOGREL BISULFATE 75 MG/1
75 TABLET ORAL DAILY
Qty: 90 TABLET | Refills: 3 | Status: SHIPPED | OUTPATIENT
Start: 2023-02-16 | End: 2024-02-11

## 2023-02-16 RX ORDER — ATORVASTATIN CALCIUM 40 MG/1
40 TABLET, FILM COATED ORAL
Qty: 90 TABLET | Refills: 3 | Status: SHIPPED | OUTPATIENT
Start: 2023-02-16 | End: 2024-02-11

## 2023-02-16 RX ORDER — LATANOPROST 50 UG/ML
SOLUTION/ DROPS OPHTHALMIC
COMMUNITY
Start: 2023-02-03

## 2023-02-16 NOTE — PROGRESS NOTES
HISTORY OF PRESENT ILLNESS  Daniel Arreola is a 80 y.o. female. HPI  hx HTN HLD prediabetes osteopenia of hip and spine , left sciatica   Here for [facility]-admitted for left LCA CVA-had right arm weakness, numbness and tingling  Still has some clumsiness of right arm and hand-using shoulder sling  No other neuro deficits  Toprol and norvasc doses were lowered  Plavix and lipitor are new  Taking aspirin    Incidental 6 mm lung nodule-nonsmoker--repeat CT in 6 months was recommended  Brain MRI  FINDINGS: There are multiple, tiny, acute infarctions in the left frontal lobe,  with a few, tiny, acute infarctions in the left parietal and posterior temporal  lobes. Scattered periventricular and subcortical white matter signal  abnormalities are consistent with chronic small vessel ischemic disease. The  ventricles and sulci are appropriate for age. The main intracranial arterial  flow-voids are normal. No hemorrhage. IMPRESSION  Multiple, tiny, acute infarctions in the left MCA territory    CT Perfusion:  Normal CT perfusion. IMPRESSION  1. No large vessel occlusion or hemodynamically significant stenosis. No  perfusion abnormality. 2.  Partially visualized pulmonary nodules in the right upper lobe measuring at  least 5 mm. Recommend chest CT for further workup  Admit date: 2/10/2023  Discharge date and time: 2/14/2023     DISCHARGE DIAGNOSIS:     Acute CVA, left MCA territory  Right clumsy hand syndrome  HTN  Lung nodule, 6 mm        CONSULTATIONS:  IP CONSULT TO NEUROLOGY     Excerpted HPI from H&P of Dr Jessica Tran  Subjective: 60-year-old female with past medical history of multiple comorbidities presents to [facility] with complaints of right arm weakness/numbness/tingling.   Patient states she was in her usual state of health until 3 days back when she start experiencing sudden onset persistent right arm weakness/numbness/tingling as well as a difficulty in using her hand, as there was no improvement in her symptomatology patient presented to the ED. Patient denies any fevers chills nausea vomiting lightheadedness dizziness dyspnea orthopnea paroxysmal nocturnal dyspnea chest pain palpitations headache auditory or visual symptoms abdominal stool or urinary complaints or any other associated symptoms. Patient endorses no recent sick contacts or travel activity        ______________________________________________________________________  DISCHARGE SUMMARY/HOSPITAL COURSE:  for full details see H&P, daily progress notes, labs, consult notes. Acute CVA, left MCA territory  Right clumsy hand syndrome     -MRI brain positive for multiple acute infarct in left MCA territory  -Likely etiology small vessel disease/HTN  -CT head and CTA head and neck without LVO  -Continue aspirin 81 mg daily, reports compliance  -Continue added Plavix 75 mg daily, for 21 days  -.4, A1c 5.8  -Atorvastatin 40 mg daily  -Neurology on board  Echocardiogram was which showed no PFO. Had grade 1 diastolic dysfunction.  -Outpatient Occupational Therapy     HTN  -Blood pressure controlled with her home medication, continue     Lung nodule, 6 mm, CT chest reviewed with the patient, multiple nonspecific nodules measuring up to 6 mm . Recommends repeat CT chest in 6 months. Advised patient to discuss with PCP to repeat in 6 months, she understands it  -              _______________________________________________________________________  Patient seen and examined by me on discharge day. Pertinent Findings:  Gen:    Not in distress  Chest:  Clear lungs  CVS:    Regular rhythm. No edema  Abd:     Soft, not distended, not tender  Neuro:  Alert,   _______________________________________________________________________  DISCHARGE MEDICATIONS:   Current Discharge Medication List              START taking these medications     Details   clopidogreL (PLAVIX) 75 mg tab Take 1 Tablet by mouth daily for 30 days.   Qty: 30 Tablet, Refills: 0  Start date: 2/13/2023, End date: 3/15/2023       aspirin 81 mg chewable tablet Take 1 Tablet by mouth daily for 30 days. Qty: 30 Tablet, Refills: 0  Start date: 2/13/2023, End date: 3/15/2023       atorvastatin (LIPITOR) 40 mg tablet Take 1 Tablet by mouth nightly for 30 days. Qty: 30 Tablet, Refills: 0  Start date: 2/13/2023, End date: 3/15/2023       OTHER Outpatient occupation Therapy- Evalv and treat  For acute CVA  Qty: 1 Each, Refills: 0  Start date: 2/13/2023                  CONTINUE these medications which have CHANGED     Details   amLODIPine (NORVASC) 10 mg tablet Take 0.5 Tablets by mouth daily for 30 days. Qty: 15 Tablet, Refills: 0  Start date: 2/13/2023, End date: 3/15/2023       metoprolol succinate (TOPROL-XL) 50 mg XL tablet Take 0.5 Tablets by mouth daily. Qty: 90 Tablet, Refills: 1  Start date: 2/13/2023                  CONTINUE these medications which have NOT CHANGED     Details   doxazosin (CARDURA) 1 mg tablet Take 1 Tablet by mouth daily. Qty: 90 Tablet, Refills: 3       cholecalciferol (VITAMIN D3) (1000 Units /25 mcg) tablet Take 2 Tablets by mouth daily. Qty: 60 Tablet, Refills: 0       lisinopril-hydroCHLOROthiazide (PRINZIDE, ZESTORETIC) 20-12.5 mg per tablet 2 qd  Qty: 180 Tablet, Refills: 3       timolol (TIMOPTIC) 0.5 % ophthalmic solution Administer 1 Drop to both eyes daily. MULTIVITAMINS W-MINERALS/LUT (CENTRUM SILVER PO) Take 1 Tab by mouth daily. CALCIUM CARBONATE (CALTRATE 600 PO) Take 1 Tab by mouth two (2) times a day. !! gabapentin (NEURONTIN) 100 mg capsule Take 100 mg by mouth once as needed for Pain. !! gabapentin (NEURONTIN) 300 mg capsule Take 300 mg by mouth three (3) times daily as needed. !! - Potential duplicate medications found. Please discuss with provider. Patient Follow Up Instructions:    Activity: Activity as tolerated  Diet: Cardiac Diet  Wound Care: None needed           Follow-up Information         Follow up With Specialties Details Why Contact Kayla Fuchs MD Internal Medicine Physician Schedule an appointment as soon as possible for a visit in 1 week(s) Repeat CT Chest 6 Months  Pt prefers to schedule her own appt. Shashank Hinkle MD Neurology Schedule an appointment as soon as possible for a visit in 4 week(s) for Neurology follow up appt. Office [address] [facility]  [address] [facility]  [phone]                ________________________________________________________________     Risk of deterioration: Low     Condition at Discharge:  Stable  __________________________________________________________________     Disposition  Home with family, no needs     ____________________________________________________________________     Code Status: Full Code  ___________________________________________________________________        Total time in minutes spent coordinating this discharge (includes going over instructions, follow-up, prescriptions, and preparing report for sign off to her PCP) :  35 minutes       Patient Active Problem List    Diagnosis Date Noted    CVA (cerebral vascular accident) ([facility] 02/11/2023    TIA (transient ischemic attack) 02/10/2023    Bilateral carotid artery stenosis 02/10/2023    Thrombotic stroke involving left middle cerebral artery ([facility] 02/10/2023    Prediabetes 08/30/2016    BPPV (benign paroxysmal positional vertigo) 03/19/2016    Osteopenia 01/12/2016    DDD (degenerative disc disease), lumbar 02/28/2014    Essential hypertension, benign 02/21/2010    Osteoporosis 02/21/2010    Depression 02/21/2010     Current Outpatient Medications   Medication Sig Dispense Refill    clopidogreL (PLAVIX) 75 mg tab Take 1 Tablet by mouth daily for 30 days. 30 Tablet 0    aspirin 81 mg chewable tablet Take 1 Tablet by mouth daily for 30 days.  30 Tablet 0 atorvastatin (LIPITOR) 40 mg tablet Take 1 Tablet by mouth nightly for 30 days. 30 Tablet 0    OTHER Outpatient occupation Therapy- Evalv and treat  For acute CVA 1 Each 0    amLODIPine (NORVASC) 10 mg tablet Take 0.5 Tablets by mouth daily for 30 days. 15 Tablet 0    metoprolol succinate (TOPROL-XL) 50 mg XL tablet Take 0.5 Tablets by mouth daily. 90 Tablet 1    doxazosin (CARDURA) 1 mg tablet Take 1 Tablet by mouth daily. 90 Tablet 3    cholecalciferol (VITAMIN D3) (1000 Units /25 mcg) tablet Take 2 Tablets by mouth daily. 60 Tablet 0    lisinopril-hydroCHLOROthiazide (PRINZIDE, ZESTORETIC) 20-12.5 mg per tablet 2 qd 180 Tablet 3    gabapentin (NEURONTIN) 100 mg capsule Take 100 mg by mouth once as needed for Pain.      gabapentin (NEURONTIN) 300 mg capsule Take 300 mg by mouth three (3) times daily as needed. timolol (TIMOPTIC) 0.5 % ophthalmic solution Administer 1 Drop to both eyes daily. MULTIVITAMINS W-MINERALS/LUT (CENTRUM SILVER PO) Take 1 Tab by mouth daily. CALCIUM CARBONATE (CALTRATE 600 PO) Take 1 Tab by mouth two (2) times a day. No Known Allergies   Lab Results   Component Value Date/Time    Cholesterol, total 176 02/11/2023 03:07 AM    HDL Cholesterol 49 02/11/2023 03:07 AM    LDL, calculated 103.4 (H) 02/11/2023 03:07 AM    Triglyceride 118 02/11/2023 03:07 AM    CHOL/HDL Ratio 3.6 02/11/2023 03:07 AM     Lab Results   Component Value Date/Time    GFR est non-AA >60 11/01/2021 03:36 PM    GFR est AA >60 11/01/2021 03:36 PM    Creatinine 0.97 02/11/2023 03:07 AM    BUN 18 02/11/2023 03:07 AM    Sodium 140 02/11/2023 03:07 AM    Potassium 4.2 02/11/2023 03:07 AM    Chloride 104 02/11/2023 03:07 AM    CO2 32 02/11/2023 03:07 AM    Magnesium 2.2 05/08/2015 07:42 AM        Review of Systems   Constitutional:  Negative for chills, fever, malaise/fatigue and weight loss. Eyes:  Negative for blurred vision and double vision. Respiratory:  Negative for cough and shortness of breath. Cardiovascular:  Negative for chest pain and palpitations. Gastrointestinal:  Negative for abdominal pain, blood in stool, constipation, diarrhea, melena, nausea and vomiting. Genitourinary:  Negative for dysuria, frequency, hematuria and urgency. Musculoskeletal:  Negative for back pain, falls, joint pain and myalgias. Neurological:  Negative for dizziness, tremors and headaches. Right arm and hand weakness-mild     Physical Exam  Vitals and nursing note reviewed. Constitutional:       Appearance: She is well-developed. HENT:      Head: Normocephalic and atraumatic. Right Ear: Tympanic membrane normal.      Left Ear: Tympanic membrane normal.   Eyes:      Pupils: Pupils are equal, round, and reactive to light. Neck:      Vascular: No carotid bruit. Cardiovascular:      Rate and Rhythm: Normal rate and regular rhythm. Pulses: Normal pulses. Heart sounds: Normal heart sounds. No murmur heard. No friction rub. No gallop. Pulmonary:      Effort: Pulmonary effort is normal. No respiratory distress. Breath sounds: Normal breath sounds. No wheezing or rales. Abdominal:      Palpations: Abdomen is soft. Musculoskeletal:      Cervical back: Normal range of motion and neck supple. Right lower leg: No edema. Left lower leg: No edema. Lymphadenopathy:      Cervical: No cervical adenopathy. Skin:     General: Skin is warm. Neurological:      Mental Status: She is alert. Comments: Mild weakness right hand  and UE strength   Psychiatric:         Mood and Affect: Mood normal.         Behavior: Behavior normal.         Thought Content: Thought content normal.         Judgment: Judgment normal.       ASSESSMENT and PLAN    ICD-10-CM ICD-9-CM    1. [facility]  I63.9 434.91 REFERRAL TO NEUROLOGY-stroke clinic      REFERRAL TO CARDIOLOGY--Left MCA CVA with multiple tiny infarcts in several region left brain-?  Embolic  Refer to CARD VCS--? MELANIE or holter  Continue aspirin and plavix-refilled  On statin  Bp ok  Refer outpt PT/O  No driving 6 months--d/w pt      2.  Lung nodule  R91.1 793.11 CT CHEST WO CONT   3        HTN--controlled on lower dose of meds-refilled  4       HLD-labs next OV on statin   RTC 3 months

## 2023-02-16 NOTE — PROGRESS NOTES
1. Have you been to the ER, urgent care clinic since your last visit? Hospitalized since your last visit? [facility] [place] 2/14    2. Have you seen or consulted any other health care providers outside of the [address] [place] [address] since your last visit? Include any pap smears or colon screening.  No

## 2023-02-17 ENCOUNTER — TELEPHONE (OUTPATIENT)
Dept: NEUROLOGY | Age: 83
End: 2023-02-17

## 2023-02-21 ENCOUNTER — TELEPHONE (OUTPATIENT)
Dept: INTERNAL MEDICINE CLINIC | Age: 83
End: 2023-02-21

## 2023-02-21 NOTE — TELEPHONE ENCOUNTER
States that pt cannot get into stroke clinic until April 19    Asks if that is ok or what is recommended       Please call

## 2023-02-22 NOTE — TELEPHONE ENCOUNTER
PT daughter called at this time. 2 identifiers confirmed. Per Dr. Leticia Suarez:  It is okay to only be able to get in stroke clinic until April. No further concern voiced.

## 2023-03-10 RX ORDER — CLOPIDOGREL BISULFATE 75 MG/1
75 TABLET ORAL DAILY
Qty: 90 TABLET | Refills: 3 | Status: SHIPPED | OUTPATIENT
Start: 2023-03-10 | End: 2024-03-04

## 2023-03-10 NOTE — TELEPHONE ENCOUNTER
Caller requests Refill of:  clopidogreL (PLAVIX) 75 mg tab        Please send to:  [facility] [id number] John Aranda, [address] [place] [address]  [phone]      Visit Appointment History:  Future Appointments:  Future Appointments   Date Time Provider John Sauceda   4/19/2023  9:40 AM DO PAOLO Corral BS AMB   6/8/2023  3:00 PM India Horner MD [facility] BS AMB   8/16/2023 11:00 AM [facility] CT 1 [facility]         Last Appointment With Me:  2/16/2023         Caller confirmed instructions and dosages as correct. Caller was advised that Meds will be refilled as soon as possible, however there can be a 48-72 business hour turn around on refill requests.

## 2023-03-16 DIAGNOSIS — R52 PAIN: Primary | ICD-10-CM

## 2023-03-16 RX ORDER — GABAPENTIN 100 MG/1
100 CAPSULE ORAL
Qty: 90 CAPSULE | Refills: 1 | Status: SHIPPED | OUTPATIENT
Start: 2023-03-16

## 2023-03-16 RX ORDER — GABAPENTIN 300 MG/1
300 CAPSULE ORAL
Qty: 270 CAPSULE | Refills: 1 | Status: SHIPPED | OUTPATIENT
Start: 2023-03-16

## 2023-03-16 NOTE — TELEPHONE ENCOUNTER
Patient states she is out of Medications. Patient is requesting refills for Both Gabapentin Dosages of 100 Mg's & also 300 Mg's to be done thru [facility] on file//Indicated. Please call if any questions/Concerns.  Thank you

## 2023-03-16 NOTE — TELEPHONE ENCOUNTER
Future Appointments:  Future Appointments   Date Time Provider John Sauceda   4/19/2023  9:40 AM DO PAOLO Haji BS AMB   6/8/2023  3:00 PM Daniela Alexander MD [facility] BS AMB   8/16/2023 11:00 AM [facility] CT 1 [facility]        Last Appointment With Me:  2/16/2023     Requested Prescriptions     Pending Prescriptions Disp Refills    gabapentin (NEURONTIN) 100 mg capsule       Sig: Take 1 Capsule by mouth once as needed for Pain for up to 1 dose. Max Daily Amount: 100 mg.    gabapentin (NEURONTIN) 300 mg capsule       Sig: Take 1 Capsule by mouth three (3) times daily as needed. Max Daily Amount: 900 mg.

## 2023-04-19 ENCOUNTER — OFFICE VISIT (OUTPATIENT)
Dept: NEUROLOGY | Age: 83
End: 2023-04-19
Payer: MEDICARE

## 2023-04-19 VITALS
WEIGHT: 173.6 LBS | HEART RATE: 68 BPM | HEIGHT: 62 IN | DIASTOLIC BLOOD PRESSURE: 80 MMHG | SYSTOLIC BLOOD PRESSURE: 112 MMHG | TEMPERATURE: 97.6 F | BODY MASS INDEX: 31.94 KG/M2 | OXYGEN SATURATION: 98 % | RESPIRATION RATE: 18 BRPM

## 2023-04-19 DIAGNOSIS — I63.512 LEFT MIDDLE CEREBRAL ARTERY STROKE (HCC): Primary | ICD-10-CM

## 2023-04-19 PROCEDURE — G9717 DOC PT DX DEP/BP F/U NT REQ: HCPCS | Performed by: INTERNAL MEDICINE

## 2023-04-19 PROCEDURE — G8536 NO DOC ELDER MAL SCRN: HCPCS | Performed by: INTERNAL MEDICINE

## 2023-04-19 PROCEDURE — 1101F PT FALLS ASSESS-DOCD LE1/YR: CPT | Performed by: INTERNAL MEDICINE

## 2023-04-19 PROCEDURE — 3079F DIAST BP 80-89 MM HG: CPT | Performed by: INTERNAL MEDICINE

## 2023-04-19 PROCEDURE — G8428 CUR MEDS NOT DOCUMENT: HCPCS | Performed by: INTERNAL MEDICINE

## 2023-04-19 PROCEDURE — 99215 OFFICE O/P EST HI 40 MIN: CPT | Performed by: INTERNAL MEDICINE

## 2023-04-19 PROCEDURE — 1123F ACP DISCUSS/DSCN MKR DOCD: CPT | Performed by: INTERNAL MEDICINE

## 2023-04-19 PROCEDURE — G8417 CALC BMI ABV UP PARAM F/U: HCPCS | Performed by: INTERNAL MEDICINE

## 2023-04-19 PROCEDURE — 3074F SYST BP LT 130 MM HG: CPT | Performed by: INTERNAL MEDICINE

## 2023-04-19 PROCEDURE — 1090F PRES/ABSN URINE INCON ASSESS: CPT | Performed by: INTERNAL MEDICINE

## 2023-04-19 NOTE — PROGRESS NOTES
Neurology Note    Patient ID:  Bayron Leos  [id number]  58 y.o.  1940      Date of Consultation:  April 19, 2023      Assessment and Plan:  77-year-old female presenting as a hospital follow-up for left MCA territory embolic infarct, concerning for possible underlying cardioembolic process versus small vessel disease. Patient was started on DAPT and discharged, prior history of aspirin use, will continue Plavix monotherapy for secondary stroke prevention. CTA head and neck unremarkable for flow-limiting stenosis or large artery atherosclerosis. .4, hemoglobin A1c 5.8%. Patient is currently wearing a cardiac event monitor for 30 days. TTE was unremarkable. Recommendations:  - Plavix 75 mg daily for secondary stroke prophylaxis  - May discontinue aspirin  - Continue cardiac event monitor for 30 days  - PT, OT to continue  - Intensity statin for LDL goal of less than 70  - Risk factor modification including prediabetes, hypertension, hyperlipidemia PCP  - Discussed aerobic exercise and Mediterranean diet provided pamphlet     Problem was discussed at length with the patient. We reviewed the results of the study in detail. We also discussed prognosis and treatment options. The patient had opportunity today to ask all questions, expressed understanding of the instructions provided, and agreed with the plan of treatment. Follow up in 1 year. I spent 60 minutes providing care to this patient, reviewing the patient's chart, notes, labs, medications and preparing documentation along with >50% of the time spent counseling patient during the encounter. History of Present Illness:   Bayron Leos is a 80 y.o. female who presents today for left MCA territory stroke in the hospital follow-up for outpatient stroke management.   Please see neurology consultation note:  \"Elisa Crocker is a 80 y.o. female with history of hypertension, hyperlipidemia, history of smoking presenting with 3-day history of right hand clumsiness and difficulty with coordination, with MRI finding of multiple tiny acute infarcts in the left MCA territory which correlates with patient's clinical findings on exam.  Initial head CT and CTA head and neck unremarkable. TTE is pending. Hemoglobin A1c and lipid panel pending. \"    Interval events:   Since hospital discharge has completed PT. He has 3 more days left with OT. She has no residual symptoms from her right hand weakness that she presented with and seems that she has full strength with proper coordination. Denies any numbness. She has no new neurological symptoms. She was taking aspirin prior to hospitalization, will now continue on Plavix only. She is currently wearing her cardiac event monitor.     Past Medical History:   Diagnosis Date    Arthritis     back    Cataract     Hypertension     Stroke [facility])     Vertigo         Past Surgical History:   Procedure Laterality Date    COLONOSCOPY N/A 2/5/2019    COLONOSCOPY performed by Elizabeth Cullen MD at [facility] ENDOSCOPY    COLONOSCOPY,JESSICA Wright  11/24/2015         COLORECTAL SCRN; HI RISK IND  11/24/2015         COLORECTAL SCRN; HI RISK IND  2/5/2019         HX HYSTERECTOMY      HX OTHER SURGICAL Left 2008    Fatty tumor removed L shoulder    [place] COLSC FLX W/RMVL OF TUMOR POLYP LESION SNARE TQ  6/8/2010             Family History   Problem Relation Age of Onset    Diabetes Mother     Heart Disease Mother 61    Hypertension Mother     Hypertension Father     Arthritis-rheumatoid Sister     Cancer Sister         stomach    Hypertension Sister     Arthritis-rheumatoid Brother     Hypertension Brother     Stroke Sister     Kidney Disease Brother     Cancer Daughter         colon        Social History     Tobacco Use    Smoking status: Never    Smokeless tobacco: Never   Substance Use Topics    Alcohol use: No        No Known Allergies     Prior to Admission medications    Medication Sig Start Date End Date Taking? Authorizing Provider   gabapentin (NEURONTIN) 100 mg capsule Take 1 Capsule by mouth daily as needed for Pain. 3/16/23  Yes Yair Fuchs MD   gabapentin (NEURONTIN) 300 mg capsule Take 1 Capsule by mouth three (3) times daily as needed (pain). Max Daily Amount: 900 mg. 3/16/23  Yes Yair Fuchs MD   clopidogreL (PLAVIX) 75 mg tab Take 1 Tablet by mouth daily for 360 days. 3/10/23 3/4/24 Yes Yair Fuchs MD   latanoprost (XALATAN) 0.005 % ophthalmic solution  2/3/23  Yes Provider, Historical   atorvastatin (LIPITOR) 40 mg tablet Take 1 Tablet by mouth nightly for 360 days. 2/16/23 2/11/24 Yes Yair Fuchs MD   amLODIPine (NORVASC) 5 mg tablet Take 1 Tablet by mouth daily. 2/16/23  Yes Yair Fuchs MD   metoprolol succinate (TOPROL-XL) 25 mg XL tablet Take 1 Tablet by mouth daily. 2/16/23  Yes Yair Fuchs MD   OTHER Outpatient occupation Therapy- [facility] and treat  For acute CVA 2/13/23  Yes Heidy Bales MD   doxazosin (CARDURA) 1 mg tablet Take 1 Tablet by mouth daily. 12/2/22  Yes Yair Fuchs MD   cholecalciferol (VITAMIN D3) (1000 Units /25 mcg) tablet Take 2 Tablets by mouth daily. 12/2/22  Yes Yair Fuchs MD   lisinopril-hydroCHLOROthiazide Florence Sruthi, ZESTORETIC) 20-12.5 mg per tablet 2 qd 11/2/22  Yes Yair Fuchs MD   timolol (TIMOPTIC) 0.5 % ophthalmic solution Administer 1 Drop to both eyes daily. 8/16/16  Yes Provider, Historical   MULTIVITAMINS W-MINERALS/LUT (CENTRUM SILVER PO) Take 1 Tab by mouth daily. Yes Provider, Historical   CALCIUM CARBONATE (CALTRATE 600 PO) Take 1 Tab by mouth two (2) times a day. Yes Provider, Historical   aspirin 81 mg chewable tablet Take 1 Tablet by mouth daily for 30 days.  2/13/23 3/15/23  Heidy Bales MD       Review of Systems:    General, constitutional: negative  Eyes, vision: negative  Ears, nose, throat: negative  Cardiovascular, heart: negative  Respiratory: negative  Gastrointestinal: negative  Genitourinary: negative  Musculoskeletal: negative  Skin and integumentary: negative  Psychiatric: negative  Endocrine: negative  Neurological: negative, except for HPI  Hematologic/lymphatic: negative  Allergy/immunology: negative    []Unable to obtain  ROS due to  []mental status change  []sedated   []intubated    Objective:     Visit Vitals  /80 (BP 1 Location: Left upper arm, BP Patient Position: Sitting, BP Cuff Size: Large adult)   Pulse 68   Temp 97.6 °F (36.4 °C) (Temporal)   Resp 18   Ht 5' 2\" (1.575 m)   Wt 173 lb 9.6 oz (78.7 kg)   SpO2 98%   BMI 31.75 kg/m²       Physical Exam:  General:  appears well nourished in no acute distress  Neck: no obvious deformity or masses  Lungs: comfortable on room air  Heart:  well-perfused   Lower extremity: no edema  Skin: intact    Neurological exam:  Awake, alert, oriented to person, place and time  Recent and remote memory were normal  Attention and concentration were intact  Language was intact. There was no aphasia  Speech: no dysarthria  Fund of knowledge was preserved    Cranial nerves:   II-XII were tested    PERRRLA  Visual fields were full to finger counting   EOMI, no evidence of nystagmus  Facial sensation:  normal and symmetric  Facial motor: normal and symmetric  Hearing intact  SCM strength intact  Tongue: midline without fasciculations    Motor: Tone normal in upper and lower extremities     Strength testing:   deltoid triceps biceps Wrist ext. Wrist flex. intrinsics Hip flex. Hip ext. Knee ext. Knee flex Dorsi flex Plantar flex   Right 5 5 5 5 5 5 5 NT 5 5 5 5   Left 5 5 5 5 5 5 5 NT 5 5 5 5       Sensory:  Intact to LT throughout     Reflexes:    Right Left  Biceps  2 2  Triceps  2 2  Brachiorad. 2 2  Patella  2 2  Achilles 2 2    Plantar response:  flexor bilaterally    Cerebellar testing:  no tremor apparent, finger/nose and rapid alternating movements were intact    Gait: steady.      Labs:     Lab Results   Component Value Date/Time    Hemoglobin A1c 5.8 (H) 02/11/2023 03:07 AM    Sodium 140 02/11/2023 03:07 AM    Potassium 4.2 02/11/2023 03:07 AM    Chloride 104 02/11/2023 03:07 AM    Glucose 94 02/11/2023 03:07 AM    BUN 18 02/11/2023 03:07 AM    Creatinine 0.97 02/11/2023 03:07 AM    Calcium 9.9 02/11/2023 03:07 AM    WBC 4.5 02/11/2023 03:07 AM    HCT 38.0 02/11/2023 03:07 AM    HGB 11.8 02/11/2023 03:07 AM    PLATELET 556 60/11/7396 03:07 AM       Imaging:    Results from Hospital Encounter encounter on 02/10/23    MRI BRAIN WO CONT    Narrative  BRAIN MRI WITHOUT CONTRAST: 2/10/2023 6:10 PM    INDICATION: Transient ischemic attack/Cerebrovascular accident. COMPARISON: Same day CT    TECHNIQUE: Images were obtained in multiple planes and sequences to emphasize  T1, T2, and T2* information. Diffusion-weighted images and ADC maps were also  obtained. FINDINGS: There are multiple, tiny, acute infarctions in the left frontal lobe,  with a few, tiny, acute infarctions in the left parietal and posterior temporal  lobes. Scattered periventricular and subcortical white matter signal  abnormalities are consistent with chronic small vessel ischemic disease. The  ventricles and sulci are appropriate for age. The main intracranial arterial  flow-voids are normal. No hemorrhage. Impression  Multiple, tiny, acute infarctions in the left MCA territory. The findings were conveyed via secure electronic text message (USINE IO) to  Murray Gaston NP on 2/10/2023 11:20 PM by Dr. Hayes Solano. [id number]      Results from Hospital Encounter encounter on 02/10/23    CT CHEST W CONT    Narrative  INDICATION:   evaluate pulmonary nodules    EXAM:  CT CHEST WITH  CONTRAST    COMPARISON:  CTA head and neck 2/10/2023    TECHNIQUE:  Thin collimation axial images were obtained through the chest with  IV contrast administration. Coronal and sagittal reconstructions were obtained.   CT dose reduction was achieved through use of a standardized protocol tailored  for this examination and automatic exposure control for dose modulation. FINDINGS:    LUNGS: Several small nodules and nodular opacities measuring up to 6 mm (series  4, images 32, 48, 61, 62, 63). Bibasilar atelectasis. No other significant  pulmonary abnormality. LYMPH NODES: No thoracic lymphadenopathy. PLEURAL FLUID: No pleural effusion. PERICARDIAL FLUID: No pericardial effusion. THYROID: No thyroid nodule. OTHER: A couple small low-density liver lesions nonspecific. Moderate  degenerative changes in the spine. Impression  Several nonspecific lung nodules and nodular opacities measuring up to 6 mm. Recommend follow-up in 6 months. Patient Active Problem List   Diagnosis Code    Essential hypertension, benign I10    Osteoporosis M81.0    Depression F32. A    DDD (degenerative disc disease), lumbar M51.36    Osteopenia M85.80    BPPV (benign paroxysmal positional vertigo) H81.10    Prediabetes R73.03    TIA (transient ischemic attack) G45.9    Bilateral carotid artery stenosis I65.23    Thrombotic stroke involving left middle cerebral artery [facility]) C14.889    CVA (cerebral vascular accident) ([facility] [address] I63.9                   Signed By:   Mir Rasheed DO  Neurophysiology      April 19, 2023

## 2023-04-19 NOTE — PROGRESS NOTES
Chief Complaint   Patient presents with    Hospital Follow Up     ED [facility] 2/10-2/14/23 for acute CVA- still getting OT and PT for right arm weakness      1. Have you been to the ER, urgent care clinic since your last visit? Hospitalized since your last visit? No     2. Have you seen or consulted any other health care providers outside of the [address] [place] [address] since your last visit? Include any pap smears or colon screening.   No

## 2023-04-22 DIAGNOSIS — R91.1 LUNG NODULE: Primary | ICD-10-CM

## 2023-06-08 ENCOUNTER — OFFICE VISIT (OUTPATIENT)
Age: 83
End: 2023-06-08
Payer: MEDICARE

## 2023-06-08 VITALS
SYSTOLIC BLOOD PRESSURE: 142 MMHG | OXYGEN SATURATION: 99 % | HEIGHT: 62 IN | TEMPERATURE: 97 F | RESPIRATION RATE: 16 BRPM | BODY MASS INDEX: 32.09 KG/M2 | HEART RATE: 74 BPM | WEIGHT: 174.4 LBS | DIASTOLIC BLOOD PRESSURE: 90 MMHG

## 2023-06-08 DIAGNOSIS — I10 HYPERTENSION, UNSPECIFIED TYPE: ICD-10-CM

## 2023-06-08 DIAGNOSIS — Z86.73 HISTORY OF CEREBROVASCULAR ACCIDENT: ICD-10-CM

## 2023-06-08 DIAGNOSIS — R91.1 LUNG NODULE: ICD-10-CM

## 2023-06-08 DIAGNOSIS — Z91.81 AT HIGH RISK FOR FALLS: ICD-10-CM

## 2023-06-08 DIAGNOSIS — E78.5 HYPERLIPIDEMIA, UNSPECIFIED HYPERLIPIDEMIA TYPE: Primary | ICD-10-CM

## 2023-06-08 PROCEDURE — G8427 DOCREV CUR MEDS BY ELIG CLIN: HCPCS | Performed by: INTERNAL MEDICINE

## 2023-06-08 PROCEDURE — G8399 PT W/DXA RESULTS DOCUMENT: HCPCS | Performed by: INTERNAL MEDICINE

## 2023-06-08 PROCEDURE — 3080F DIAST BP >= 90 MM HG: CPT | Performed by: INTERNAL MEDICINE

## 2023-06-08 PROCEDURE — 3077F SYST BP >= 140 MM HG: CPT | Performed by: INTERNAL MEDICINE

## 2023-06-08 PROCEDURE — 99214 OFFICE O/P EST MOD 30 MIN: CPT | Performed by: INTERNAL MEDICINE

## 2023-06-08 PROCEDURE — G8417 CALC BMI ABV UP PARAM F/U: HCPCS | Performed by: INTERNAL MEDICINE

## 2023-06-08 PROCEDURE — 1090F PRES/ABSN URINE INCON ASSESS: CPT | Performed by: INTERNAL MEDICINE

## 2023-06-08 PROCEDURE — 1123F ACP DISCUSS/DSCN MKR DOCD: CPT | Performed by: INTERNAL MEDICINE

## 2023-06-08 PROCEDURE — 1036F TOBACCO NON-USER: CPT | Performed by: INTERNAL MEDICINE

## 2023-06-08 RX ORDER — AMLODIPINE BESYLATE 10 MG/1
10 TABLET ORAL DAILY
Qty: 90 TABLET | Refills: 3 | Status: SHIPPED | OUTPATIENT
Start: 2023-06-08

## 2023-06-08 SDOH — ECONOMIC STABILITY: FOOD INSECURITY: WITHIN THE PAST 12 MONTHS, YOU WORRIED THAT YOUR FOOD WOULD RUN OUT BEFORE YOU GOT MONEY TO BUY MORE.: NEVER TRUE

## 2023-06-08 SDOH — ECONOMIC STABILITY: HOUSING INSECURITY
IN THE LAST 12 MONTHS, WAS THERE A TIME WHEN YOU DID NOT HAVE A STEADY PLACE TO SLEEP OR SLEPT IN A SHELTER (INCLUDING NOW)?: NO

## 2023-06-08 SDOH — ECONOMIC STABILITY: FOOD INSECURITY: WITHIN THE PAST 12 MONTHS, THE FOOD YOU BOUGHT JUST DIDN'T LAST AND YOU DIDN'T HAVE MONEY TO GET MORE.: NEVER TRUE

## 2023-06-08 SDOH — ECONOMIC STABILITY: INCOME INSECURITY: HOW HARD IS IT FOR YOU TO PAY FOR THE VERY BASICS LIKE FOOD, HOUSING, MEDICAL CARE, AND HEATING?: NOT HARD AT ALL

## 2023-06-08 NOTE — PROGRESS NOTES
1. \"Have you been to the ER, urgent care clinic since your last visit? Hospitalized since your last visit? \" No    2. \"Have you seen or consulted any other health care providers outside of the [address] [place] [address] since your last visit? \" No     3. For patients aged 39-70: Has the patient had a colonoscopy / FIT/ Cologuard? If the patient is female:    4. For patients aged 41-77: Has the patient had a mammogram within the past 2 years? 5. For patients aged 21-65: Has the patient had a pap smear?
medicines and bp monitoring-lower home readings  History of cerebrovascular accident   On plavix   Bp and lipid control   Fu Neuro and CARD  Lung nodule   CT as scheduled  At high risk for falls      Rtc 6 months wellness    Suzy Leon MD   On the basis of positive falls risk screening, assessment and plan is as follows: home safety tips provided.

## 2023-06-09 LAB
ALT SERPL-CCNC: 22 U/L (ref 12–78)
AST SERPL-CCNC: 19 U/L (ref 15–37)
CHOLEST SERPL-MCNC: 120 MG/DL
COMMENT:: NORMAL
HDLC SERPL-MCNC: 53 MG/DL
HDLC SERPL: 2.3 (ref 0–5)
LDLC SERPL CALC-MCNC: 45.8 MG/DL (ref 0–100)
SPECIMEN HOLD: NORMAL
TRIGL SERPL-MCNC: 106 MG/DL
VLDLC SERPL CALC-MCNC: 21.2 MG/DL

## 2023-06-19 ENCOUNTER — TELEPHONE (OUTPATIENT)
Age: 83
End: 2023-06-19

## 2023-06-19 ENCOUNTER — TRANSCRIBE ORDERS (OUTPATIENT)
Facility: HOSPITAL | Age: 83
End: 2023-06-19

## 2023-06-19 DIAGNOSIS — Z12.31 VISIT FOR SCREENING MAMMOGRAM: Primary | ICD-10-CM

## 2023-06-19 NOTE — TELEPHONE ENCOUNTER
----- Message from Stefano Davalos sent at 6/19/2023  1:25 PM EDT -----  Subject: Message to Provider    QUESTIONS  Information for Provider? Patient is calling in and she is wanting to have   a request of medical records faxed over to Dr. Leah Biswas and the number is   [phone] for the fax number. She does need this sent over as soon as   possible and attention to Dr. Leah Biswas. Please call her once this is   complete. Thank you.   ---------------------------------------------------------------------------  --------------  Jorge EUGENE  [phone]; OK to leave message on voicemail  ---------------------------------------------------------------------------  --------------  SCRIPT ANSWERS  Relationship to Patient?  Self

## 2023-06-20 NOTE — TELEPHONE ENCOUNTER
Recent office note and labs faxed over to Dr. Gaston Pedersen office at this time. Confirmation received.

## 2023-06-26 ENCOUNTER — TELEPHONE (OUTPATIENT)
Age: 83
End: 2023-06-26

## 2023-07-12 ENCOUNTER — HOSPITAL ENCOUNTER (OUTPATIENT)
Facility: HOSPITAL | Age: 83
Discharge: HOME | End: 2023-07-15
Attending: INTERNAL MEDICINE
Payer: MEDICARE

## 2023-07-12 DIAGNOSIS — Z12.31 VISIT FOR SCREENING MAMMOGRAM: ICD-10-CM

## 2023-07-12 PROCEDURE — 77063 BREAST TOMOSYNTHESIS BI: CPT

## 2023-08-16 ENCOUNTER — HOSPITAL ENCOUNTER (OUTPATIENT)
Facility: HOSPITAL | Age: 83
Discharge: HOME | End: 2023-08-19
Payer: MEDICARE

## 2023-08-16 DIAGNOSIS — R91.1 LUNG NODULE: ICD-10-CM

## 2023-08-16 PROCEDURE — 71250 CT THORAX DX C-: CPT

## 2023-09-04 DIAGNOSIS — R52 PAIN: Primary | ICD-10-CM

## 2023-09-05 PROBLEM — M54.30 SCIATICA: Status: ACTIVE | Noted: 2023-09-05

## 2023-09-05 RX ORDER — GABAPENTIN 100 MG/1
CAPSULE ORAL
Qty: 90 CAPSULE | Refills: 1 | Status: SHIPPED | OUTPATIENT
Start: 2023-09-05 | End: 2024-03-03

## 2023-09-05 RX ORDER — GABAPENTIN 300 MG/1
CAPSULE ORAL
Qty: 270 CAPSULE | Refills: 1 | Status: SHIPPED | OUTPATIENT
Start: 2023-09-05 | End: 2024-03-03

## 2023-11-22 ENCOUNTER — TELEPHONE (OUTPATIENT)
Age: 83
End: 2023-11-22

## 2023-11-22 NOTE — TELEPHONE ENCOUNTER
Caller States:  Symptoms:   Cough, worsening  Dry   Denies fever   Denies runny nose  Denies sore throat  Denies body aches   Denies headaches   Onset:   2-3 weeks  Covid Test:  Not taken  What has been tried:  Dayquil  Mucus relief like mucinex      Appointments:  Appointment offers during call:  Declined VV, wants medication if possible. Date of last appointment:    6/8/2023       Pharmacy:  [facility] [phone] - [address] [facility] [phone] - F [phone]        Request:  Medication for cough        Caller confirms readback of documented phone/fax number(s) as correct. Caller advised that there can be a 24-48 business hour turn around time on callbacks. Caller advised that if pt deems they cannot wait any longer or symptoms worsen, ED or UC would be another option to consider for immediate treatment.

## 2023-11-24 ENCOUNTER — TELEPHONE (OUTPATIENT)
Age: 83
End: 2023-11-24

## 2023-11-24 RX ORDER — BENZONATATE 100 MG/1
100 CAPSULE ORAL 3 TIMES DAILY PRN
Qty: 30 CAPSULE | Refills: 0 | Status: SHIPPED | OUTPATIENT
Start: 2023-11-24 | End: 2023-12-04

## 2023-12-26 RX ORDER — BENZONATATE 100 MG/1
100 CAPSULE ORAL 3 TIMES DAILY PRN
Qty: 30 CAPSULE | Refills: 1 | Status: SHIPPED | OUTPATIENT
Start: 2023-12-26 | End: 2024-01-15

## 2023-12-27 DIAGNOSIS — R73.03 DIABETES MELLITUS, LATENT: ICD-10-CM

## 2023-12-27 DIAGNOSIS — I10 ESSENTIAL HYPERTENSION, MALIGNANT: ICD-10-CM

## 2023-12-27 DIAGNOSIS — E78.00 PURE HYPERCHOLESTEROLEMIA: Primary | ICD-10-CM

## 2024-01-11 ENCOUNTER — TELEPHONE (OUTPATIENT)
Age: 84
End: 2024-01-11

## 2024-01-11 ENCOUNTER — OFFICE VISIT (OUTPATIENT)
Age: 84
End: 2024-01-11
Payer: MEDICARE

## 2024-01-11 VITALS
TEMPERATURE: 98.1 F | DIASTOLIC BLOOD PRESSURE: 88 MMHG | HEART RATE: 68 BPM | RESPIRATION RATE: 18 BRPM | OXYGEN SATURATION: 99 % | WEIGHT: 171.4 LBS | SYSTOLIC BLOOD PRESSURE: 132 MMHG | BODY MASS INDEX: 31.54 KG/M2 | HEIGHT: 62 IN

## 2024-01-11 DIAGNOSIS — L21.9 SEBORRHEIC DERMATITIS: ICD-10-CM

## 2024-01-11 DIAGNOSIS — B37.2 CANDIDAL DERMATITIS: Primary | ICD-10-CM

## 2024-01-11 PROCEDURE — 3079F DIAST BP 80-89 MM HG: CPT | Performed by: INTERNAL MEDICINE

## 2024-01-11 PROCEDURE — 99213 OFFICE O/P EST LOW 20 MIN: CPT | Performed by: INTERNAL MEDICINE

## 2024-01-11 PROCEDURE — 1036F TOBACCO NON-USER: CPT | Performed by: INTERNAL MEDICINE

## 2024-01-11 PROCEDURE — G8417 CALC BMI ABV UP PARAM F/U: HCPCS | Performed by: INTERNAL MEDICINE

## 2024-01-11 PROCEDURE — G8427 DOCREV CUR MEDS BY ELIG CLIN: HCPCS | Performed by: INTERNAL MEDICINE

## 2024-01-11 PROCEDURE — G8399 PT W/DXA RESULTS DOCUMENT: HCPCS | Performed by: INTERNAL MEDICINE

## 2024-01-11 PROCEDURE — 3075F SYST BP GE 130 - 139MM HG: CPT | Performed by: INTERNAL MEDICINE

## 2024-01-11 PROCEDURE — G8484 FLU IMMUNIZE NO ADMIN: HCPCS | Performed by: INTERNAL MEDICINE

## 2024-01-11 PROCEDURE — 1123F ACP DISCUSS/DSCN MKR DOCD: CPT | Performed by: INTERNAL MEDICINE

## 2024-01-11 PROCEDURE — 1090F PRES/ABSN URINE INCON ASSESS: CPT | Performed by: INTERNAL MEDICINE

## 2024-01-11 RX ORDER — CLOBETASOL PROPIONATE 0.5 MG/G
CREAM TOPICAL
Qty: 15 G | Refills: 0 | Status: SHIPPED | OUTPATIENT
Start: 2024-01-11

## 2024-01-11 RX ORDER — NYSTATIN 100000 U/G
CREAM TOPICAL
Qty: 30 G | Refills: 0 | Status: SHIPPED | OUTPATIENT
Start: 2024-01-11

## 2024-01-11 ASSESSMENT — PATIENT HEALTH QUESTIONNAIRE - PHQ9
6. FEELING BAD ABOUT YOURSELF - OR THAT YOU ARE A FAILURE OR HAVE LET YOURSELF OR YOUR FAMILY DOWN: 0
1. LITTLE INTEREST OR PLEASURE IN DOING THINGS: 0
4. FEELING TIRED OR HAVING LITTLE ENERGY: 0
SUM OF ALL RESPONSES TO PHQ QUESTIONS 1-9: 0
10. IF YOU CHECKED OFF ANY PROBLEMS, HOW DIFFICULT HAVE THESE PROBLEMS MADE IT FOR YOU TO DO YOUR WORK, TAKE CARE OF THINGS AT HOME, OR GET ALONG WITH OTHER PEOPLE: 0
5. POOR APPETITE OR OVEREATING: 0
2. FEELING DOWN, DEPRESSED OR HOPELESS: 0
SUM OF ALL RESPONSES TO PHQ QUESTIONS 1-9: 0
7. TROUBLE CONCENTRATING ON THINGS, SUCH AS READING THE NEWSPAPER OR WATCHING TELEVISION: 0
8. MOVING OR SPEAKING SO SLOWLY THAT OTHER PEOPLE COULD HAVE NOTICED. OR THE OPPOSITE, BEING SO FIGETY OR RESTLESS THAT YOU HAVE BEEN MOVING AROUND A LOT MORE THAN USUAL: 0
SUM OF ALL RESPONSES TO PHQ QUESTIONS 1-9: 0
9. THOUGHTS THAT YOU WOULD BE BETTER OFF DEAD, OR OF HURTING YOURSELF: 0
3. TROUBLE FALLING OR STAYING ASLEEP: 0
SUM OF ALL RESPONSES TO PHQ QUESTIONS 1-9: 0
SUM OF ALL RESPONSES TO PHQ9 QUESTIONS 1 & 2: 0

## 2024-01-11 NOTE — PROGRESS NOTES
\"Have you been to the ER, urgent care clinic since your last visit?  Hospitalized since your last visit?\"    NO    “Have you seen or consulted any other health care providers outside of [facility] since your last visit?”    NO

## 2024-01-11 NOTE — TELEPHONE ENCOUNTER
Patient states she needs an Acute Appt with Dr. Gonzalez for Rash on Left Hip, Under Left Breast & also on Right Leg. No Appts available at time of call. Please call to advise. Thank you

## 2024-01-11 NOTE — PROGRESS NOTES
HISTORY OF PRESENT ILLNESS   Natasha Li   is a 83 y.o.  female.    Hx  left MCA CVA  HTN HLD prediabetes osteopenia of hip and spine , left sciatica , lung nodule--  Pt seeing DERM Dr Hernandez for seb derm of face x 6 months on metro crm and ketoconazole crm    Last month noted panfil led lesion near left hip and also b/l groin rash-itchy  Today noted red circular rash just below the left breat-not itchy  Also has red but dry area of rash rigth lower leg of unknown duration     C/o rash  Last OV  Cva with right arm weakness earlier this year-improving right hand is still weak  Neuro Dr Cabrera-on plavix  CARD Dr Sparks-Jeri yesterday for VALENCIA     Stable lung nodules-largest  6 mm 8/2023-unchanged  Quit 30 plus yrs ago- 1ppd < 1 year     Home BP -up and down     Has improving cough-took tessalon perles       Current Outpatient Medications   Medication Sig Dispense Refill    benzonatate (TESSALON) 100 MG capsule Take 1 capsule by mouth 3 times daily as needed for Cough 30 capsule 1    gabapentin (NEURONTIN) 100 MG capsule TAKE ONE CAPSULE BY MOUTH DAILY AS NEEDED FOR PAIN 90 capsule 1    gabapentin (NEURONTIN) 300 MG capsule TAKE ONE CAPSULE BY MOUTH THREE TIMES A DAY AS NEEDED 270 capsule 1    calcium carbonate 1500 (600 Ca) MG TABS tablet Take 1 tablet by mouth 2 times daily      MULTIPLE VITAMINS-MINERALS ER PO Take 1 tablet by mouth      amLODIPine (NORVASC) 10 MG tablet Take 1 tablet by mouth daily 90 tablet 3    atorvastatin (LIPITOR) 40 MG tablet Take 1 tablet by mouth      vitamin D (CHOLECALCIFEROL) 25 MCG (1000 UT) TABS tablet Take 2 tablets by mouth daily      clopidogrel (PLAVIX) 75 MG tablet Take 1 tablet by mouth daily      latanoprost (XALATAN) 0.005 % ophthalmic solution ceived the following from [facility]: Outside name: latanoprost (XALATAN) 0.005 % ophthalmic solution      lisinopril-hydroCHLOROthiazide (PRINZIDE;ZESTORETIC) 20-12.5 MG per tablet 2 qd      metoprolol succinate

## 2024-02-12 RX ORDER — LISINOPRIL AND HYDROCHLOROTHIAZIDE 20; 12.5 MG/1; MG/1
2 TABLET ORAL DAILY
Qty: 180 TABLET | Refills: 1 | Status: SHIPPED | OUTPATIENT
Start: 2024-02-12

## 2024-03-12 RX ORDER — CLOPIDOGREL BISULFATE 75 MG/1
75 TABLET ORAL DAILY
Qty: 90 TABLET | Refills: 3 | Status: SHIPPED | OUTPATIENT
Start: 2024-03-12

## 2024-03-12 RX ORDER — ATORVASTATIN CALCIUM 40 MG/1
40 TABLET, FILM COATED ORAL DAILY
Qty: 90 TABLET | Refills: 3 | Status: SHIPPED | OUTPATIENT
Start: 2024-03-12

## 2024-03-12 RX ORDER — ATORVASTATIN CALCIUM 40 MG/1
40 TABLET, FILM COATED ORAL NIGHTLY
Qty: 90 TABLET | Refills: 3 | Status: SHIPPED | OUTPATIENT
Start: 2024-03-12

## 2024-04-04 DIAGNOSIS — R52 PAIN: ICD-10-CM

## 2024-04-04 DIAGNOSIS — G62.9 NEUROPATHY: Primary | ICD-10-CM

## 2024-04-04 RX ORDER — GABAPENTIN 300 MG/1
CAPSULE ORAL
Qty: 270 CAPSULE | Refills: 0 | Status: SHIPPED | OUTPATIENT
Start: 2024-04-04 | End: 2024-07-22

## 2024-04-04 RX ORDER — GABAPENTIN 100 MG/1
CAPSULE ORAL
Qty: 90 CAPSULE | Refills: 0 | Status: SHIPPED | OUTPATIENT
Start: 2024-04-04 | End: 2024-10-07

## 2024-05-19 NOTE — PROGRESS NOTES
HISTORY OF PRESENT ILLNESS   Natasha Li   is a 84 y.o.  female.  FU  left MCA CVA -right arm weakness  HTN HLD prediabetes osteopenia of hip and spine , left sciatica , lung nodule-- 6mm largest 8/2023  Neuro Dr Carlos-has fu in July  CARD Dr Sparks-has fu in July-neg NST last year. Remote home BP monitring    Due chest CT in about 3 mos-f/u 6 mm nodule  Home BP  Right arm weakness      LAST ov  Pt seeing DERM Dr Hernandez for seb derm of face x 6 months on metro crm and ketoconazole crm     Last month noted panfil led lesion near left hip and also b/l groin rash-itchy  Today noted red circular rash just below the left breat-not itchy  Also has red but dry area of rash rigth lower leg of unknown duration     C/o rash  Patient Active Problem List    Diagnosis Date Noted    Sciatica 09/05/2023    CVA (cerebral vascular accident) (HCC) 02/11/2023    TIA (transient ischemic attack) 02/10/2023    Bilateral carotid artery stenosis 02/10/2023    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    gabapentin (NEURONTIN) 300 MG capsule TAKE 1 CAPSULE BY MOUTH THREE TIMES A DAY AS NEEDED 270 capsule 0    gabapentin (NEURONTIN) 100 MG capsule TAKE 1 CAPSULE BY MOUTH DAILY AS NEEDED FOR PAIN 90 capsule 0    clopidogrel (PLAVIX) 75 MG tablet TAKE ONE TABLET BY MOUTH DAILY 90 tablet 3    atorvastatin (LIPITOR) 40 MG tablet TAKE ONE TABLET BY MOUTH ONCE NIGHTLY 90 tablet 3    atorvastatin (LIPITOR) 40 MG tablet Take 1 tablet by mouth daily 90 tablet 3    clopidogrel (PLAVIX) 75 MG tablet Take 1 tablet by mouth daily 90 tablet 3    lisinopril-hydroCHLOROthiazide (PRINZIDE;ZESTORETIC)

## 2024-05-21 ENCOUNTER — OFFICE VISIT (OUTPATIENT)
Age: 84
End: 2024-05-21
Payer: MEDICARE

## 2024-05-21 VITALS
OXYGEN SATURATION: 96 % | BODY MASS INDEX: 32.76 KG/M2 | TEMPERATURE: 97.2 F | RESPIRATION RATE: 16 BRPM | SYSTOLIC BLOOD PRESSURE: 122 MMHG | WEIGHT: 178 LBS | HEART RATE: 71 BPM | HEIGHT: 62 IN | DIASTOLIC BLOOD PRESSURE: 80 MMHG

## 2024-05-21 DIAGNOSIS — I10 HYPERTENSION, UNSPECIFIED TYPE: ICD-10-CM

## 2024-05-21 DIAGNOSIS — E78.00 PURE HYPERCHOLESTEROLEMIA: ICD-10-CM

## 2024-05-21 DIAGNOSIS — B37.2 CANDIDAL DERMATITIS: ICD-10-CM

## 2024-05-21 DIAGNOSIS — Z86.73 HISTORY OF STROKE: ICD-10-CM

## 2024-05-21 DIAGNOSIS — R91.8 LUNG NODULES: Primary | ICD-10-CM

## 2024-05-21 DIAGNOSIS — R73.03 PREDIABETES: ICD-10-CM

## 2024-05-21 LAB
ANION GAP SERPL CALC-SCNC: 5 MMOL/L (ref 5–15)
BUN SERPL-MCNC: 20 MG/DL (ref 6–20)
BUN/CREAT SERPL: 22 (ref 12–20)
CALCIUM SERPL-MCNC: 9.7 MG/DL (ref 8.5–10.1)
CHLORIDE SERPL-SCNC: 105 MMOL/L (ref 97–108)
CHOLEST SERPL-MCNC: 126 MG/DL
CO2 SERPL-SCNC: 31 MMOL/L (ref 21–32)
CREAT SERPL-MCNC: 0.92 MG/DL (ref 0.55–1.02)
ERYTHROCYTE [DISTWIDTH] IN BLOOD BY AUTOMATED COUNT: 14.5 % (ref 11.5–14.5)
EST. AVERAGE GLUCOSE BLD GHB EST-MCNC: 117 MG/DL
GLUCOSE SERPL-MCNC: 90 MG/DL (ref 65–100)
HBA1C MFR BLD: 5.7 % (ref 4–5.6)
HCT VFR BLD AUTO: 35.3 % (ref 35–47)
HDLC SERPL-MCNC: 62 MG/DL
HDLC SERPL: 2 (ref 0–5)
HGB BLD-MCNC: 11.1 G/DL (ref 11.5–16)
LDLC SERPL CALC-MCNC: 53.2 MG/DL (ref 0–100)
MCH RBC QN AUTO: 27.3 PG (ref 26–34)
MCHC RBC AUTO-ENTMCNC: 31.4 G/DL (ref 30–36.5)
MCV RBC AUTO: 86.7 FL (ref 80–99)
NRBC # BLD: 0 K/UL (ref 0–0.01)
NRBC BLD-RTO: 0 PER 100 WBC
PLATELET # BLD AUTO: 235 K/UL (ref 150–400)
PMV BLD AUTO: 11.4 FL (ref 8.9–12.9)
POTASSIUM SERPL-SCNC: 3.7 MMOL/L (ref 3.5–5.1)
RBC # BLD AUTO: 4.07 M/UL (ref 3.8–5.2)
SODIUM SERPL-SCNC: 141 MMOL/L (ref 136–145)
TRIGL SERPL-MCNC: 54 MG/DL
VLDLC SERPL CALC-MCNC: 10.8 MG/DL
WBC # BLD AUTO: 5.3 K/UL (ref 3.6–11)

## 2024-05-21 PROCEDURE — G8417 CALC BMI ABV UP PARAM F/U: HCPCS | Performed by: INTERNAL MEDICINE

## 2024-05-21 PROCEDURE — 1036F TOBACCO NON-USER: CPT | Performed by: INTERNAL MEDICINE

## 2024-05-21 PROCEDURE — 1090F PRES/ABSN URINE INCON ASSESS: CPT | Performed by: INTERNAL MEDICINE

## 2024-05-21 PROCEDURE — 99214 OFFICE O/P EST MOD 30 MIN: CPT | Performed by: INTERNAL MEDICINE

## 2024-05-21 PROCEDURE — 3074F SYST BP LT 130 MM HG: CPT | Performed by: INTERNAL MEDICINE

## 2024-05-21 PROCEDURE — 1123F ACP DISCUSS/DSCN MKR DOCD: CPT | Performed by: INTERNAL MEDICINE

## 2024-05-21 PROCEDURE — 3079F DIAST BP 80-89 MM HG: CPT | Performed by: INTERNAL MEDICINE

## 2024-05-21 PROCEDURE — G8428 CUR MEDS NOT DOCUMENT: HCPCS | Performed by: INTERNAL MEDICINE

## 2024-05-21 PROCEDURE — G8399 PT W/DXA RESULTS DOCUMENT: HCPCS | Performed by: INTERNAL MEDICINE

## 2024-05-21 RX ORDER — NYSTATIN 100000 U/G
CREAM TOPICAL
Qty: 30 G | Refills: 0 | Status: SHIPPED | OUTPATIENT
Start: 2024-05-21

## 2024-05-21 ASSESSMENT — PATIENT HEALTH QUESTIONNAIRE - PHQ9
SUM OF ALL RESPONSES TO PHQ9 QUESTIONS 1 & 2: 0
2. FEELING DOWN, DEPRESSED OR HOPELESS: NOT AT ALL
SUM OF ALL RESPONSES TO PHQ QUESTIONS 1-9: 0
1. LITTLE INTEREST OR PLEASURE IN DOING THINGS: NOT AT ALL
SUM OF ALL RESPONSES TO PHQ QUESTIONS 1-9: 0

## 2024-05-21 NOTE — PROGRESS NOTES
Identified pt with two pt identifiers(name and ). Reviewed record in preparation for visit and have obtained necessary documentation.  Chief Complaint   Patient presents with    Follow-up        Health Maintenance Due   Topic    DTaP/Tdap/Td vaccine (1 - Tdap)    Respiratory Syncytial Virus (RSV) Pregnant or age 60 yrs+ (1 - 1-dose 60+ series)    Shingles vaccine (2 of 2)    COVID-19 Vaccine ( season)    Lipids      Vitals:    24 1355   BP: (!) 136/92   Site: Left Upper Arm   Position: Sitting   Cuff Size: Large Adult   Pulse: 71   Resp: 16   Temp: 97.2 °F (36.2 °C)   TempSrc: Temporal   SpO2: 96%   Weight: 80.7 kg (178 lb)   Height: 1.575 m (5' 2\")      Pain Scale: 0 - No pain/10    Coordination of Care Questionnaire:  :   1. Have you been to the ER, urgent care clinic since your last visit?  Hospitalized since your last visit?No    2. Have you seen or consulted any other health care providers outside of the [facility] since your last visit?  Include any pap smears or colon screening. No

## 2024-05-22 ENCOUNTER — CLINICAL DOCUMENTATION (OUTPATIENT)
Age: 84
End: 2024-05-22

## 2024-05-23 LAB
FERRITIN SERPL-MCNC: 52 NG/ML (ref 8–252)
IRON SATN MFR SERPL: 17 % (ref 20–50)
IRON SERPL-MCNC: 53 UG/DL (ref 35–150)
TIBC SERPL-MCNC: 313 UG/DL (ref 250–450)

## 2024-05-28 DIAGNOSIS — D50.9 IRON DEFICIENCY ANEMIA, UNSPECIFIED IRON DEFICIENCY ANEMIA TYPE: Primary | ICD-10-CM

## 2024-05-28 RX ORDER — FERROUS SULFATE 325(65) MG
325 TABLET ORAL
Qty: 90 TABLET | Refills: 1 | Status: SHIPPED | OUTPATIENT
Start: 2024-05-28

## 2024-07-08 DIAGNOSIS — B37.2 CANDIDAL DERMATITIS: ICD-10-CM

## 2024-07-08 RX ORDER — NYSTATIN 100000 U/G
CREAM TOPICAL
Qty: 30 G | Refills: 0 | Status: SHIPPED | OUTPATIENT
Start: 2024-07-08

## 2024-07-08 RX ORDER — CLOBETASOL PROPIONATE 0.5 MG/G
CREAM TOPICAL
Qty: 15 G | Refills: 0 | Status: SHIPPED | OUTPATIENT
Start: 2024-07-08

## 2024-07-17 ENCOUNTER — OFFICE VISIT (OUTPATIENT)
Age: 84
End: 2024-07-17
Payer: MEDICARE

## 2024-07-17 VITALS
WEIGHT: 182 LBS | BODY MASS INDEX: 33.49 KG/M2 | OXYGEN SATURATION: 98 % | RESPIRATION RATE: 16 BRPM | DIASTOLIC BLOOD PRESSURE: 84 MMHG | SYSTOLIC BLOOD PRESSURE: 138 MMHG | HEART RATE: 78 BPM | HEIGHT: 62 IN

## 2024-07-17 DIAGNOSIS — R41.3 MEMORY LOSS: ICD-10-CM

## 2024-07-17 DIAGNOSIS — R20.2 PARESTHESIA AND PAIN OF RIGHT EXTREMITY: ICD-10-CM

## 2024-07-17 DIAGNOSIS — E55.9 VITAMIN D DEFICIENCY: ICD-10-CM

## 2024-07-17 DIAGNOSIS — Z86.73 HISTORY OF CVA (CEREBROVASCULAR ACCIDENT): Primary | ICD-10-CM

## 2024-07-17 DIAGNOSIS — M79.609 PARESTHESIA AND PAIN OF RIGHT EXTREMITY: ICD-10-CM

## 2024-07-17 PROCEDURE — 3075F SYST BP GE 130 - 139MM HG: CPT

## 2024-07-17 PROCEDURE — G8427 DOCREV CUR MEDS BY ELIG CLIN: HCPCS

## 2024-07-17 PROCEDURE — 1036F TOBACCO NON-USER: CPT

## 2024-07-17 PROCEDURE — 1090F PRES/ABSN URINE INCON ASSESS: CPT

## 2024-07-17 PROCEDURE — 1123F ACP DISCUSS/DSCN MKR DOCD: CPT

## 2024-07-17 PROCEDURE — 3079F DIAST BP 80-89 MM HG: CPT

## 2024-07-17 PROCEDURE — G8417 CALC BMI ABV UP PARAM F/U: HCPCS

## 2024-07-17 PROCEDURE — G8399 PT W/DXA RESULTS DOCUMENT: HCPCS

## 2024-07-17 PROCEDURE — 99215 OFFICE O/P EST HI 40 MIN: CPT

## 2024-07-17 RX ORDER — UREA 10 %
500 LOTION (ML) TOPICAL DAILY
COMMUNITY

## 2024-07-17 ASSESSMENT — PATIENT HEALTH QUESTIONNAIRE - PHQ9
SUM OF ALL RESPONSES TO PHQ QUESTIONS 1-9: 0
2. FEELING DOWN, DEPRESSED OR HOPELESS: NOT AT ALL
SUM OF ALL RESPONSES TO PHQ QUESTIONS 1-9: 0
SUM OF ALL RESPONSES TO PHQ9 QUESTIONS 1 & 2: 0
SUM OF ALL RESPONSES TO PHQ QUESTIONS 1-9: 0
1. LITTLE INTEREST OR PLEASURE IN DOING THINGS: NOT AT ALL
SUM OF ALL RESPONSES TO PHQ QUESTIONS 1-9: 0

## 2024-07-17 NOTE — PROGRESS NOTES
1. Have you been to the ER, urgent care clinic since your last visit?  Hospitalized since your last visit?  No.    2. Have you seen or consulted any other health care providers outside of the [facility] since your last visit?  Include any pap smears or colon screening.   No.      Chief Complaint   Patient presents with    Cerebrovascular Accident     Follow up- right hand issues, dropping food when eating and not being able to turn        
05/21/2024 52  8 - 252 NG/ML Final          No Known Allergies     Current Outpatient Medications   Medication Sig    vitamin B-12 (CYANOCOBALAMIN) 500 MCG tablet Take 1 tablet by mouth daily    clobetasol (TEMOVATE) 0.05 % cream APPLY TOPICALLY TWO TIMES A DAY TO RIGHT LOWER LEG RASH    nystatin (MYCOSTATIN) 200270 UNIT/GM cream APPLY TO THE AFFECTED AREA(S) 2 TIMES A DAY    ferrous sulfate (IRON 325) 325 (65 Fe) MG tablet Take 1 tablet by mouth daily (with breakfast)    gabapentin (NEURONTIN) 300 MG capsule TAKE 1 CAPSULE BY MOUTH THREE TIMES A DAY AS NEEDED    gabapentin (NEURONTIN) 100 MG capsule TAKE 1 CAPSULE BY MOUTH DAILY AS NEEDED FOR PAIN    clopidogrel (PLAVIX) 75 MG tablet TAKE ONE TABLET BY MOUTH DAILY    atorvastatin (LIPITOR) 40 MG tablet TAKE ONE TABLET BY MOUTH ONCE NIGHTLY    lisinopril-hydroCHLOROthiazide (PRINZIDE;ZESTORETIC) 20-12.5 MG per tablet TAKE TWO TABLETS BY MOUTH DAILY    calcium carbonate 1500 (600 Ca) MG TABS tablet Take 1 tablet by mouth 2 times daily    MULTIPLE VITAMINS-MINERALS ER PO Take 1 tablet by mouth daily    amLODIPine (NORVASC) 10 MG tablet Take 1 tablet by mouth daily    vitamin D (CHOLECALCIFEROL) 25 MCG (1000 UT) TABS tablet Take 2 tablets by mouth daily    latanoprost (XALATAN) 0.005 % ophthalmic solution Place 1 drop into both eyes daily    metoprolol succinate (TOPROL XL) 25 MG extended release tablet Take 1 tablet by mouth daily    timolol (TIMOPTIC) 0.5 % ophthalmic solution Apply 1 drop to eye daily     No current facility-administered medications for this visit.       Past medical history/surgical history, family history, and social history have been reviewed for today's visit      Review of Systems   Constitutional:  Positive for appetite change (fluctuation).   HENT: Negative.     Eyes:         O longer drives due to eyesight issues   Respiratory: Negative.     Cardiovascular: Negative.    Gastrointestinal: Negative.    Endocrine: Negative.    Genitourinary:

## 2024-07-17 NOTE — PATIENT INSTRUCTIONS
As per our discussion,    Overall, you seemed stable without recurrence of stroke symptoms    Continue on Plavix 75 mg and atorvastatin 40 mg daily.    Continue to work to all of your comorbid conditions as managed by PCP and other specialists as appropriate    RECOMMENDATIONS:  - BP goal is less than 140/90  - Goal HbA1c is less than 7.   - LDL less than 70      I provided stroke education today in regards to risk factors for stroke and lifestyle modifications to help minimize the risk of future stroke.  This included medication compliance, regular follow up with primary care physician,  and healthy lifestyle habits (nutrition/exercise) .    As for the pain on the right arm, will obtain an EMG to look for any other factors that may contribute to her symptoms.    For your memory, it is likely her symptoms will be coming from the stroke send status when your symptoms started.  However, we will check your labs today to look for any deficiencies.  Will make recommendations based on what the results show.    If you change your mind about obtaining the neuropsych testing, please let us know.    I encouraged you to engage in approximately 2.5 hours of physician approved physical activity on a weekly basis.    To maintain a healthy diet. Also was informed of the Mediterranean diet, which has been associated with reduced risk for neurodegenerative conditions as well as heart disease.    To maintain a cognitively stimulated lifestyle, also incorporating social interaction.    It was a pleasure to meet you and your granddaughter today    Will see you back in a year or sooner if needed    Please do not hesitate to reach out for any questions or concerns.

## 2024-07-18 PROBLEM — E55.9 VITAMIN D DEFICIENCY: Status: ACTIVE | Noted: 2024-07-18

## 2024-07-18 PROBLEM — R20.2 PARESTHESIA AND PAIN OF RIGHT EXTREMITY: Status: ACTIVE | Noted: 2024-07-18

## 2024-07-18 PROBLEM — M79.609 PARESTHESIA AND PAIN OF RIGHT EXTREMITY: Status: ACTIVE | Noted: 2024-07-18

## 2024-07-18 PROBLEM — R41.3 MEMORY LOSS: Status: ACTIVE | Noted: 2024-07-18

## 2024-07-18 PROBLEM — Z86.73 HISTORY OF CVA (CEREBROVASCULAR ACCIDENT): Status: ACTIVE | Noted: 2024-07-18

## 2024-07-18 LAB
25(OH)D3 SERPL-MCNC: 51.3 NG/ML (ref 30–100)
FOLATE SERPL-MCNC: 13.2 NG/ML (ref 5–21)
RPR SER QL: NONREACTIVE
VIT B12 SERPL-MCNC: 207 PG/ML (ref 193–986)

## 2024-07-18 ASSESSMENT — ENCOUNTER SYMPTOMS
RESPIRATORY NEGATIVE: 1
ALLERGIC/IMMUNOLOGIC NEGATIVE: 1
GASTROINTESTINAL NEGATIVE: 1

## 2024-07-18 NOTE — ASSESSMENT & PLAN NOTE
Stable without recurrence of symptoms    Patient is to continue on Plavix 75 mg daily.    She is not currently on any statin.  Her last LDL was 53.2 on 5/21/2024    Patient is to continue to work to all of her comorbid conditions as managed by PCP and other specialists as appropriate    RECOMMENDATIONS:  - BP goal is less than 140/90  - Goal HbA1c is less than 7.   - LDL less than 70     Stroke education was provided today in regards to risk factors for stroke and lifestyle modifications to help minimize the risk of future stroke.    This included medication compliance, regular follow up with primary care physician,  and healthy lifestyle habits (nutrition/exercise).

## 2024-07-18 NOTE — ASSESSMENT & PLAN NOTE
Patient has been experiencing memory loss 2 months after her CVA in 2023    Her current neurological examination reveals no cognitive impairment.    Differential diagnosis include residual effect from CVA, aging, metabolic abnormality    - Will check vitamin B12, vitamin D, folate, RPR to look for any reversible causes that may contribute to her symptoms.     -Will not repeat any neuroimaging at this time given her symptoms have been going on since her stroke.  Brain MRI without contrast at that time on 2/10/2023 showed multiple, tiny, acute infarctions in the left frontal lobe, with a few, tiny, acute infarctions in the left parietal and posterior temporal lobe.  Chronic small vessel ischemic disease.    - CTA of the head and neck on 2/10/2023 revealed no large vessel occlusion or hemodynamically significant stenosis.    - Will refer her to neuropsych to rule out any other factors that may contribute to her symptoms including Alzheimer's versus vascular dementia.    We discussed that she would benefit from designation of a POA to assist with executive decision making.  Finances and medication administration should be monitored to ensure accuracy and prevent errors.     Patient was encouraged to engage in approximately 2.5 hours of physician approved physical activity on a weekly basis.    Patient was encouraged to maintain a healthy diet. Also was informed of the Mediterranean diet, which has been associated with reduced risk for neurodegenerative conditions as well as heart disease.    Patient was encouraged to maintain a cognitively stimulated lifestyle, also incorporating social interaction.

## 2024-07-18 NOTE — ASSESSMENT & PLAN NOTE
Patient verbalized she continued to experience in pain in her right upper extremity which was a residual effect from her stroke.    Symptoms have been preventing her from performing some activities of daily living.    She deferred any medication for symptoms management at this time.  She verbalized she will continue hand exercises.    If symptoms worsen and patient changes her wishes, may consider starting her on a low-dose of gabapentin.    Will obtain an EMG of bilateral upper extremity to rule out any carpal tunnel syndrome, myopathy, possible cervical radiculopathy.  Will make additional recommendations based on what EMG shows.

## 2024-07-19 NOTE — RESULT ENCOUNTER NOTE
Ms. Li,    The labs that were collected on 7/18/24 to look for any deficiency that might contribute to your memory loss was normal. There is no additional recommendation at this time, will continue with same plan of care.    Thanks,  Garret

## 2024-07-25 ENCOUNTER — TRANSCRIBE ORDERS (OUTPATIENT)
Facility: HOSPITAL | Age: 84
End: 2024-07-25

## 2024-07-25 DIAGNOSIS — Z12.31 VISIT FOR SCREENING MAMMOGRAM: Primary | ICD-10-CM

## 2024-08-08 RX ORDER — LISINOPRIL AND HYDROCHLOROTHIAZIDE 20; 12.5 MG/1; MG/1
2 TABLET ORAL DAILY
Qty: 180 TABLET | Refills: 1 | Status: SHIPPED | OUTPATIENT
Start: 2024-08-08

## 2024-08-20 ENCOUNTER — HOSPITAL ENCOUNTER (OUTPATIENT)
Facility: HOSPITAL | Age: 84
Discharge: HOME | End: 2024-08-23
Attending: INTERNAL MEDICINE
Payer: MEDICARE

## 2024-08-20 VITALS — WEIGHT: 182 LBS | HEIGHT: 62 IN | BODY MASS INDEX: 33.49 KG/M2

## 2024-08-20 DIAGNOSIS — Z12.31 VISIT FOR SCREENING MAMMOGRAM: ICD-10-CM

## 2024-08-20 DIAGNOSIS — R91.8 LUNG NODULES: ICD-10-CM

## 2024-08-20 PROCEDURE — 71250 CT THORAX DX C-: CPT

## 2024-08-20 PROCEDURE — 77063 BREAST TOMOSYNTHESIS BI: CPT

## 2024-08-23 DIAGNOSIS — G62.9 NEUROPATHY: ICD-10-CM

## 2024-08-23 DIAGNOSIS — R52 PAIN: ICD-10-CM

## 2024-08-24 RX ORDER — GABAPENTIN 300 MG/1
CAPSULE ORAL
Qty: 270 CAPSULE | Refills: 1 | Status: SHIPPED | OUTPATIENT
Start: 2024-08-24 | End: 2024-12-11

## 2024-08-24 RX ORDER — GABAPENTIN 100 MG/1
CAPSULE ORAL
Qty: 90 CAPSULE | Refills: 1 | Status: SHIPPED | OUTPATIENT
Start: 2024-08-24 | End: 2025-02-20

## 2024-10-03 RX ORDER — METOPROLOL SUCCINATE 50 MG/1
50 TABLET, EXTENDED RELEASE ORAL DAILY
Qty: 90 TABLET | Refills: 3 | Status: SHIPPED | OUTPATIENT
Start: 2024-10-03

## 2024-11-08 ENCOUNTER — TELEPHONE (OUTPATIENT)
Age: 84
End: 2024-11-08

## 2024-11-08 NOTE — TELEPHONE ENCOUNTER
Attempted to call patient to reschedule appointment on 11/22/24. Provider unavailble. AWV due 12/21/24.

## 2024-12-10 ENCOUNTER — HOSPITAL ENCOUNTER (OUTPATIENT)
Facility: HOSPITAL | Age: 84
Setting detail: OUTPATIENT SURGERY
Discharge: HOME | End: 2024-12-10
Attending: INTERNAL MEDICINE | Admitting: INTERNAL MEDICINE
Payer: MEDICARE

## 2024-12-10 ENCOUNTER — ANESTHESIA EVENT (OUTPATIENT)
Facility: HOSPITAL | Age: 84
End: 2024-12-10
Payer: MEDICARE

## 2024-12-10 ENCOUNTER — ANESTHESIA (OUTPATIENT)
Facility: HOSPITAL | Age: 84
End: 2024-12-10
Payer: MEDICARE

## 2024-12-10 VITALS
OXYGEN SATURATION: 98 % | SYSTOLIC BLOOD PRESSURE: 145 MMHG | HEIGHT: 62 IN | WEIGHT: 184 LBS | DIASTOLIC BLOOD PRESSURE: 95 MMHG | RESPIRATION RATE: 14 BRPM | BODY MASS INDEX: 33.86 KG/M2 | HEART RATE: 85 BPM

## 2024-12-10 PROCEDURE — 3700000000 HC ANESTHESIA ATTENDED CARE: Performed by: INTERNAL MEDICINE

## 2024-12-10 PROCEDURE — 3600007502: Performed by: INTERNAL MEDICINE

## 2024-12-10 PROCEDURE — 7100000010 HC PHASE II RECOVERY - FIRST 15 MIN: Performed by: INTERNAL MEDICINE

## 2024-12-10 PROCEDURE — 6360000002 HC RX W HCPCS

## 2024-12-10 PROCEDURE — 3700000001 HC ADD 15 MINUTES (ANESTHESIA): Performed by: INTERNAL MEDICINE

## 2024-12-10 PROCEDURE — 2580000003 HC RX 258: Performed by: INTERNAL MEDICINE

## 2024-12-10 PROCEDURE — 7100000011 HC PHASE II RECOVERY - ADDTL 15 MIN: Performed by: INTERNAL MEDICINE

## 2024-12-10 PROCEDURE — 3600007512: Performed by: INTERNAL MEDICINE

## 2024-12-10 PROCEDURE — 88305 TISSUE EXAM BY PATHOLOGIST: CPT

## 2024-12-10 PROCEDURE — 2709999900 HC NON-CHARGEABLE SUPPLY: Performed by: INTERNAL MEDICINE

## 2024-12-10 PROCEDURE — 2580000003 HC RX 258

## 2024-12-10 RX ORDER — SODIUM CHLORIDE 0.9 % (FLUSH) 0.9 %
5-40 SYRINGE (ML) INJECTION PRN
Status: DISCONTINUED | OUTPATIENT
Start: 2024-12-10 | End: 2024-12-10 | Stop reason: HOSPADM

## 2024-12-10 RX ORDER — LIDOCAINE HYDROCHLORIDE 20 MG/ML
INJECTION, SOLUTION EPIDURAL; INFILTRATION; INTRACAUDAL; PERINEURAL
Status: DISCONTINUED | OUTPATIENT
Start: 2024-12-10 | End: 2024-12-10 | Stop reason: SDUPTHER

## 2024-12-10 RX ORDER — SODIUM CHLORIDE 9 MG/ML
INJECTION, SOLUTION INTRAVENOUS
Status: DISCONTINUED | OUTPATIENT
Start: 2024-12-10 | End: 2024-12-10 | Stop reason: SDUPTHER

## 2024-12-10 RX ORDER — SODIUM CHLORIDE 0.9 % (FLUSH) 0.9 %
5-40 SYRINGE (ML) INJECTION EVERY 12 HOURS SCHEDULED
Status: DISCONTINUED | OUTPATIENT
Start: 2024-12-10 | End: 2024-12-10 | Stop reason: HOSPADM

## 2024-12-10 RX ORDER — PHENYLEPHRINE HCL IN 0.9% NACL 0.4MG/10ML
SYRINGE (ML) INTRAVENOUS
Status: DISCONTINUED | OUTPATIENT
Start: 2024-12-10 | End: 2024-12-10 | Stop reason: SDUPTHER

## 2024-12-10 RX ORDER — SODIUM CHLORIDE 9 MG/ML
INJECTION, SOLUTION INTRAVENOUS PRN
Status: DISCONTINUED | OUTPATIENT
Start: 2024-12-10 | End: 2024-12-10 | Stop reason: HOSPADM

## 2024-12-10 RX ADMIN — Medication 80 MCG: at 10:01

## 2024-12-10 RX ADMIN — SODIUM CHLORIDE: 9 INJECTION, SOLUTION INTRAVENOUS at 09:35

## 2024-12-10 RX ADMIN — PROPOFOL 50 MG: 10 INJECTION, EMULSION INTRAVENOUS at 09:43

## 2024-12-10 RX ADMIN — Medication 80 MCG: at 10:06

## 2024-12-10 RX ADMIN — PROPOFOL 30 MG: 10 INJECTION, EMULSION INTRAVENOUS at 09:56

## 2024-12-10 RX ADMIN — PROPOFOL 40 MG: 10 INJECTION, EMULSION INTRAVENOUS at 09:59

## 2024-12-10 RX ADMIN — PROPOFOL 50 MG: 10 INJECTION, EMULSION INTRAVENOUS at 09:49

## 2024-12-10 RX ADMIN — PROPOFOL 50 MG: 10 INJECTION, EMULSION INTRAVENOUS at 09:41

## 2024-12-10 RX ADMIN — Medication 120 MCG: at 09:57

## 2024-12-10 RX ADMIN — LIDOCAINE HYDROCHLORIDE 100 MG: 20 INJECTION, SOLUTION EPIDURAL; INFILTRATION; INTRACAUDAL; PERINEURAL at 09:41

## 2024-12-10 RX ADMIN — SODIUM CHLORIDE: 9 INJECTION, SOLUTION INTRAVENOUS at 09:19

## 2024-12-10 RX ADMIN — PROPOFOL 30 MG: 10 INJECTION, EMULSION INTRAVENOUS at 09:52

## 2024-12-10 RX ADMIN — PROPOFOL 50 MG: 10 INJECTION, EMULSION INTRAVENOUS at 09:46

## 2024-12-10 ASSESSMENT — PAIN - FUNCTIONAL ASSESSMENT: PAIN_FUNCTIONAL_ASSESSMENT: NONE - DENIES PAIN

## 2024-12-10 NOTE — OP NOTE
[facility]                  Colonoscopy Operative Report    12/10/2024      Natasha Li  [id number]  1940    Procedure Type:   Colonoscopy --diagnostic     Indications:    Iron deficiency anemia, Personal history of colon polyps (screening only)     Pre-operative Diagnosis: see indication above    Post-operative Diagnosis:  See findings below    :  KEATON Barclay Jr, MD    Referring Provider: Capo Gonzalez MD      Sedation:  MAC anesthesia Propofol    Pre-Procedural Exam:      Airway: clear,  No airway problems anticipated  Heart: RRR, without gallops or rubs  Lungs: clear bilaterally without wheezes, crackles, or rhonchi  Abdomen: soft, nontender, nondistended, bowel sounds present  Mental Status: awake, alert and oriented to person, place and time     Procedure Details:  After informed consent was obtained with all risks and benefits of procedure explained and preoperative exam completed, the patient was taken to the endoscopy suite and placed in the left lateral decubitus position.  Upon sequential sedation as per above, a digital rectal exam was performed .  The Olympus videocolonoscope  was inserted in the rectum and carefully advanced to the cecum, which was identified by the ileocecal valve and appendiceal orifice.  The cecum was identified by the ileocecal valve and appendiceal orifice.  The quality of preparation was good.  The colonoscope was slowly withdrawn with careful evaluation between folds. Retroflexion in the rectum was completed demonstrating internal hemorrhoids.     Findings:   Rectum: Grade 1 internal hemorrhoid(s);  Sigmoid:     -Diverticulosis  Descending Colon: normal  Transverse Colon: normal  Ascending Colon: normal  Cecum: normal  Terminal Ileum: not intubated      Specimen Removed:  none    Complications: None.     EBL:  None.    Impression:    normal colonic mucosa throughout  diverticulosis,  Mild in degree, involving the

## 2024-12-10 NOTE — ANESTHESIA PRE PROCEDURE
Department of Anesthesiology  Preprocedure Note       Name:  Natasha Li   Age:  84 y.o.  :  1940                                          MRN:  [id number]         Date:  12/10/2024      Surgeon: Surgeon(s):  Jorden Barclay Jr., MD    Procedure: Procedure(s):  ESOPHAGOGASTRODUODENOSCOPY  COLONOSCOPY    Medications prior to admission:   Prior to Admission medications    Medication Sig Start Date End Date Taking? Authorizing Provider   metoprolol succinate (TOPROL XL) 50 MG extended release tablet TAKE ONE TABLET BY MOUTH DAILY 10/3/24   Capo Gonzalez MD   gabapentin (NEURONTIN) 300 MG capsule TAKE 1 CAPSULE BY MOUTH THREE TIMES A DAY AS NEEDED 24  Capo Gonzalez MD   gabapentin (NEURONTIN) 100 MG capsule TAKE 1 CAPSULE BY MOUTH DAILY AS NEEDED 24  Capo Gonzalez MD   lisinopril-hydroCHLOROthiazide (PRINZIDE;ZESTORETIC) 20-12.5 MG per tablet TAKE 2 TABLETS BY MOUTH DAILY 24   Capo Gonzalez MD   vitamin B-12 (CYANOCOBALAMIN) 500 MCG tablet Take 1 tablet by mouth daily    Tennille Tristan MD   clobetasol (TEMOVATE) 0.05 % cream APPLY TOPICALLY TWO TIMES A DAY TO RIGHT LOWER LEG RASH 24   Capo Gonzalez MD   nystatin (MYCOSTATIN) 654510 UNIT/GM cream APPLY TO THE AFFECTED AREA(S) 2 TIMES A DAY 24   Capo Gonzalez MD   ferrous sulfate (IRON 325) 325 (65 Fe) MG tablet Take 1 tablet by mouth daily (with breakfast) 24   Capo Gonzalez MD   clopidogrel (PLAVIX) 75 MG tablet TAKE ONE TABLET BY MOUTH DAILY 3/12/24   Capo Gonzalez MD   atorvastatin (LIPITOR) 40 MG tablet TAKE ONE TABLET BY MOUTH ONCE NIGHTLY 3/12/24   Capo Gonzalez MD   calcium carbonate 1500 (600 Ca) MG TABS tablet Take 1 tablet by mouth 2 times daily    Tennille Tristan MD   MULTIPLE VITAMINS-MINERALS ER PO Take 1 tablet by mouth daily    Tennille Tristan MD   amLODIPine (NORVASC) 10 MG tablet Take 1 tablet by mouth daily 23   Capo Gonzalez MD   vitamin D (CHOLECALCIFEROL) 25 MCG

## 2024-12-10 NOTE — H&P
LIZETH Barclay MD  [facility]  [address], [address]  [place] [address] [phone]  [email or web address]      Gastroenterology Outpatient History and Physical    Patient: Natasha ALARCON Li    Physician: KEATON Barclay MD    Vital Signs: Blood pressure (!) 145/101, pulse 95, resp. rate 13, height 1.575 m (5' 2\"), weight 83.5 kg (184 lb), SpO2 97%.    Allergies: No Known Allergies    Chief Complaint: anemia, hx of polyps    History of Present Illness:      )   The patient is a 84 year old female who presents with a complaint of Anemia. Note for \"Anemia\": Referred by PCP Carlos Scanlon for RALEIGH. Has had issue w/ low B12 in the past but does not seem to have had issues with RALEIGH before. Does endorse some occasional heartburn, but very mild and easily treats with tums. No dysphagia. No AP. Bowel habits are unchanged, no constipation, diarrhea, or thin stools. No hematochezia, now w/ dark stools after starting PO iron in May. No weight loss. Fhx of daughter w/ Crohns and  of CRC. No etoh or smoking. on Plavix for ?hx of strokes, no diabetes meds, no PPI, takes motrin for headaches maybe 1x a month.      EGD - never    CLN 2019 - personal hx polyps - G1 I hemms, sigmoid diverticulosis, normal mucosa.  5 year repeat.     Labs 2024: HgB 11.1 867, BUNCr 22. Iron sat 17. Iron 53, Ferritin 52.       History:  Past Medical History:   Diagnosis Date    Arthritis     back    Cataract     Hyperlipidemia     Hypertension     Sleep apnea     uses CPAP machine    Stroke (HCC)     Vertigo       Past Surgical History:   Procedure Laterality Date    COLONOSCOPY N/A 2019    COLONOSCOPY performed by Jorden Barclay Jr., MD at [facility] ENDOSCOPY    COLONOSCOPY,REMV HUY,SNARE  2015         COLORECTAL SCRN; HI RISK IND  2015         COLORECTAL SCRN; HI RISK IND  2019         COLSC FLX W/RMVL OF TUMOR POLYP LESION SNARE TQ  2010

## 2024-12-10 NOTE — DISCHARGE INSTRUCTIONS
LIZETH Barclay MD  [facility]  [address], [address]  [place] [address] [phone]  [email or web address]    Natasha Li  [id number]  1940    EGD DISCHARGE INSTRUCTIONS    Discomfort:  Sore throat- throat lozenges or warm salt water gargle  redness at IV site- apply warm compress to area; if redness or soreness persist- contact your physician  Gaseous discomfort- walking, belching will help relieve any discomfort  You may not operate a vehicle for 12 hours  You may not engage in an occupation involving machinery or appliances for rest of today  You may not drink alcoholic beverages for at least 12 hours  Avoid making any critical decisions for at least 24 hour  DIET  You may have anything by mouth.  You may eat and drink immediately.  You may resume your regular diet - however -  remember your colon is empty and a heavy meal will produce gas.   Avoid these foods:  vegetables, fried / greasy foods, carbonated drinks      ACTIVITY  You may resume your normal daily activities   Spend the remainder of the day resting -  avoid any strenuous activity.    CALL M.D.  ANY SIGN OF   Increasing pain, nausea, vomiting  Abdominal distension (swelling)  New increased bleeding (oral or rectal)  Fever (chills)  Pain in chest area  Bloody discharge from nose or mouth  Shortness of breath    Follow-up Instructions:   Call Dr. KEATON Barclay for any questions or problems.  Telephone # [phone]  Dr. Barclay's office will notify you of the biopsy results available  Within 7 to 10 days. We will call you or send a letter   Continue same medications.    ENDOSCOPY FINDINGS:   Your endoscopy showed some mild esophagitis and mild gastritis. Biopsies were taken.      DISCHARGE SUMMARY from Nurse    The following personal items collected during your admission are returned to you:   Dental Appliance:    Vision:    Hearing Aid:    Jewelry:    Clothing:    Other

## 2024-12-10 NOTE — OP NOTE
[facility]                   Endoscopic Gastroduodenoscopy Procedure Note      12/10/2024  Natasha Li  1940  [id number]    Procedure: Endoscopic Gastroduodenoscopy with biopsy    Indication: Iron deficiency anemia     Pre-operative Diagnosis: see indication above    Post-operative Diagnosis: see findings below    : LIZETH Barclay Jr. MD    Referring Provider:  Capo Gonzalez MD      Anesthesia/Sedation:  MAC anesthesia Propofol    Airway assessment: No airway problems anticipated    Pre-Procedural Exam:      Airway: clear, no airway problems anticipated  Heart: RRR, without gallops or rubs  Lungs: clear bilaterally without wheezes, crackles, or rhonchi  Abdomen: soft, nontender, nondistended, bowel sounds present  Mental Status: awake, alert and oriented to person, place and time       Procedure Details     After infomed consent was obtained for the procedure, with all risks and benefits of procedure explained the patient was taken to the endoscopy suite and placed in the left lateral decubitus position.  Following sequential administration of sedation as per above, the endoscope was inserted into the mouth and advanced under direct vision to second portion of the duodenum.  A careful inspection was made as the gastroscope was withdrawn, including a retroflexed view of the proximal stomach; findings and interventions are described below.      Findings:   Esophagus:Mild esophagitis at GE junction, biopsied. Small hiatal hernia  Stomach: Mild antritis, biopsied  Duodenum: normal, biopsied    Therapies:  biopsy of esophagus  biopsy of stomach antrum  biopsy of duodenal second portion    Specimens:  1. Duodenal biopsies  2 Antral biopsies  3 GE junction biopsies           Complications:   None; patient tolerated the procedure well.    EBL:  None.            Impression:      Mild esophagitis  Mild antritis  Normal duodenum    Recommendations:  , -Await

## 2024-12-10 NOTE — PERIOP NOTE
See anesthesia note and MAR for medications given during procedure.   Endoscope was pre-cleaned at the bedside immediately following procedure by KELSEY Baker

## 2024-12-10 NOTE — ANESTHESIA POSTPROCEDURE EVALUATION
Department of Anesthesiology  Postprocedure Note    Patient: Natasha Li  MRN: [id number]  YOB: 1940  Date of evaluation: 12/10/2024    Procedure Summary       Date: 12/10/24 Room / Location: [facility] 04 / [facility] ENDOSCOPY    Anesthesia Start: 0935 Anesthesia Stop: 1011    Procedures:       ESOPHAGOGASTRODUODENOSCOPY      COLONOSCOPY Diagnosis:       Iron deficiency anemia, unspecified iron deficiency anemia type      (Iron deficiency anemia, unspecified iron deficiency anemia type [D50.9])    Surgeons: Jorden Barclay Jr., MD Responsible Provider: Koby Tolliver MD    Anesthesia Type: MAC ASA Status: 3            Anesthesia Type: MAC    Salty Phase I: Salty Score: 10    Salty Phase II: Salty Score: 9    Anesthesia Post Evaluation    Patient location during evaluation: PACU  Patient participation: complete - patient participated  Level of consciousness: sleepy but conscious and responsive to verbal stimuli  Pain score: 1  Airway patency: patent  Nausea & Vomiting: no vomiting and no nausea  Cardiovascular status: blood pressure returned to baseline and hemodynamically stable  Respiratory status: acceptable  Hydration status: stable  Multimodal analgesia pain management approach  Pain management: adequate    No notable events documented.

## 2024-12-10 NOTE — PERIOP NOTE
ARRIVAL INFORMATION:  Verified patient name and date of birth, scheduled procedure, and informed consent.     : Juan (daughter) contact number: [phone]  Physician and staff can share information with the .     Receive texts: yes    Belongings with patient include:  Clothing, cane    GI FOCUSED ASSESSMENT:  Neuro: Awake, alert, oriented x4  Respiratory: even and unlabored   GI: soft and non-distended  EKG Rhythm: normal sinus rhythm    Education: The risks and benefits of the bite block have been explained to patient.  Patient verbalizes understanding.  Reviewed general discharge instructions and  information.

## 2024-12-17 ENCOUNTER — OFFICE VISIT (OUTPATIENT)
Age: 84
End: 2024-12-17
Payer: MEDICARE

## 2024-12-17 VITALS
HEART RATE: 80 BPM | RESPIRATION RATE: 18 BRPM | DIASTOLIC BLOOD PRESSURE: 80 MMHG | OXYGEN SATURATION: 99 % | BODY MASS INDEX: 34.16 KG/M2 | TEMPERATURE: 98.1 F | HEIGHT: 62 IN | SYSTOLIC BLOOD PRESSURE: 138 MMHG | WEIGHT: 185.6 LBS

## 2024-12-17 DIAGNOSIS — D64.9 ANEMIA, UNSPECIFIED TYPE: ICD-10-CM

## 2024-12-17 DIAGNOSIS — R73.03 PREDIABETES: Primary | ICD-10-CM

## 2024-12-17 DIAGNOSIS — Z86.73 HISTORY OF CEREBROVASCULAR ACCIDENT: ICD-10-CM

## 2024-12-17 DIAGNOSIS — Z00.00 MEDICARE ANNUAL WELLNESS VISIT, SUBSEQUENT: ICD-10-CM

## 2024-12-17 DIAGNOSIS — H91.93 BILATERAL HEARING LOSS, UNSPECIFIED HEARING LOSS TYPE: ICD-10-CM

## 2024-12-17 DIAGNOSIS — I10 HYPERTENSION, UNSPECIFIED TYPE: ICD-10-CM

## 2024-12-17 DIAGNOSIS — Z23 ENCOUNTER FOR IMMUNIZATION: ICD-10-CM

## 2024-12-17 DIAGNOSIS — E78.00 PURE HYPERCHOLESTEROLEMIA: ICD-10-CM

## 2024-12-17 DIAGNOSIS — M54.32 LEFT SIDED SCIATICA: ICD-10-CM

## 2024-12-17 PROCEDURE — 99214 OFFICE O/P EST MOD 30 MIN: CPT | Performed by: INTERNAL MEDICINE

## 2024-12-17 PROCEDURE — 90653 IIV ADJUVANT VACCINE IM: CPT | Performed by: INTERNAL MEDICINE

## 2024-12-17 SDOH — ECONOMIC STABILITY: FOOD INSECURITY: WITHIN THE PAST 12 MONTHS, THE FOOD YOU BOUGHT JUST DIDN'T LAST AND YOU DIDN'T HAVE MONEY TO GET MORE.: NEVER TRUE

## 2024-12-17 SDOH — ECONOMIC STABILITY: INCOME INSECURITY: HOW HARD IS IT FOR YOU TO PAY FOR THE VERY BASICS LIKE FOOD, HOUSING, MEDICAL CARE, AND HEATING?: NOT HARD AT ALL

## 2024-12-17 SDOH — ECONOMIC STABILITY: FOOD INSECURITY: WITHIN THE PAST 12 MONTHS, YOU WORRIED THAT YOUR FOOD WOULD RUN OUT BEFORE YOU GOT MONEY TO BUY MORE.: NEVER TRUE

## 2024-12-17 ASSESSMENT — PATIENT HEALTH QUESTIONNAIRE - PHQ9
2. FEELING DOWN, DEPRESSED OR HOPELESS: NOT AT ALL
SUM OF ALL RESPONSES TO PHQ QUESTIONS 1-9: 0
7. TROUBLE CONCENTRATING ON THINGS, SUCH AS READING THE NEWSPAPER OR WATCHING TELEVISION: SEVERAL DAYS
SUM OF ALL RESPONSES TO PHQ QUESTIONS 1-9: 0
8. MOVING OR SPEAKING SO SLOWLY THAT OTHER PEOPLE COULD HAVE NOTICED. OR THE OPPOSITE, BEING SO FIGETY OR RESTLESS THAT YOU HAVE BEEN MOVING AROUND A LOT MORE THAN USUAL: NOT AT ALL
3. TROUBLE FALLING OR STAYING ASLEEP: NOT AT ALL
5. POOR APPETITE OR OVEREATING: NOT AT ALL
6. FEELING BAD ABOUT YOURSELF - OR THAT YOU ARE A FAILURE OR HAVE LET YOURSELF OR YOUR FAMILY DOWN: NOT AT ALL
SUM OF ALL RESPONSES TO PHQ QUESTIONS 1-9: 1
10. IF YOU CHECKED OFF ANY PROBLEMS, HOW DIFFICULT HAVE THESE PROBLEMS MADE IT FOR YOU TO DO YOUR WORK, TAKE CARE OF THINGS AT HOME, OR GET ALONG WITH OTHER PEOPLE: NOT DIFFICULT AT ALL
SUM OF ALL RESPONSES TO PHQ9 QUESTIONS 1 & 2: 0
SUM OF ALL RESPONSES TO PHQ9 QUESTIONS 1 & 2: 0
9. THOUGHTS THAT YOU WOULD BE BETTER OFF DEAD, OR OF HURTING YOURSELF: NOT AT ALL
SUM OF ALL RESPONSES TO PHQ QUESTIONS 1-9: 1
SUM OF ALL RESPONSES TO PHQ QUESTIONS 1-9: 1
SUM OF ALL RESPONSES TO PHQ QUESTIONS 1-9: 0
SUM OF ALL RESPONSES TO PHQ QUESTIONS 1-9: 1
1. LITTLE INTEREST OR PLEASURE IN DOING THINGS: NOT AT ALL
4. FEELING TIRED OR HAVING LITTLE ENERGY: NOT AT ALL
1. LITTLE INTEREST OR PLEASURE IN DOING THINGS: NOT AT ALL
2. FEELING DOWN, DEPRESSED OR HOPELESS: NOT AT ALL
SUM OF ALL RESPONSES TO PHQ QUESTIONS 1-9: 0

## 2024-12-17 ASSESSMENT — LIFESTYLE VARIABLES
HOW OFTEN DO YOU HAVE A DRINK CONTAINING ALCOHOL: NEVER
HOW MANY STANDARD DRINKS CONTAINING ALCOHOL DO YOU HAVE ON A TYPICAL DAY: PATIENT DOES NOT DRINK

## 2024-12-17 NOTE — PATIENT INSTRUCTIONS
are talking to, and have them face you. Make sure the lighting is good. You need to see the other person's face clearly.  Think about counseling if you need help to adjust to your hearing loss.  When should you call for help?  Watch closely for changes in your health, and be sure to contact your doctor if:    You think your hearing is getting worse.     You have new symptoms, such as dizziness or nausea.   Where can you learn more?  Go to [email or web address]://[email or web address]/patientEd and enter R798 to learn more about \"Hearing Loss: Care Instructions.\"  Current as of: September 27, 2023  Content Version: 14.2  © 2024 [employer].   Care instructions adapted under license by [employer]. If you have questions about a medical condition or this instruction, always ask your healthcare professional. [employer] disclaims any warranty or liability for your use of this information.           Learning About Vision Tests  What are vision tests?     The four most common vision tests are visual acuity tests, refraction, visual field tests, and color vision tests.  Visual acuity (sharpness) tests  These tests are used:  To see if you need glasses or contact lenses.  To monitor an eye problem.  To check an eye injury.  Visual acuity tests are done as part of routine exams. You may also have this test when you get your 's license or apply for some types of jobs.  Visual field tests  These tests are used:  To check for vision loss in any area of your range of vision.  To screen for certain eye diseases.  To look for nerve damage after a stroke, head injury, or other problem that could reduce blood flow to the brain.  Refraction and color tests  A refraction test is done to find the right prescription for glasses and contact lenses.  A color vision test is done to check for color blindness.  Color vision is often tested as part of a routine exam. You may also have this test when you apply for a job where

## 2024-12-17 NOTE — PROGRESS NOTES
\"Have you been to the ER, urgent care clinic since your last visit?  Hospitalized since your last visit?\"    NO    “Have you seen or consulted any other health care providers outside our system since your last visit?”    NO             
visit:  Allergies  Meds  Med Hx  Surg Hx  Soc Hx  Fam Hx

## 2024-12-18 DIAGNOSIS — B37.2 CANDIDAL DERMATITIS: ICD-10-CM

## 2024-12-18 LAB
ALBUMIN SERPL-MCNC: 3.6 G/DL (ref 3.5–5)
ALBUMIN/GLOB SERPL: 1.1 (ref 1.1–2.2)
ALP SERPL-CCNC: 80 U/L (ref 45–117)
ALT SERPL-CCNC: 17 U/L (ref 12–78)
ANION GAP SERPL CALC-SCNC: 4 MMOL/L (ref 2–12)
AST SERPL-CCNC: 12 U/L (ref 15–37)
BILIRUB SERPL-MCNC: 0.3 MG/DL (ref 0.2–1)
BUN SERPL-MCNC: 21 MG/DL (ref 6–20)
BUN/CREAT SERPL: 18 (ref 12–20)
CALCIUM SERPL-MCNC: 9.4 MG/DL (ref 8.5–10.1)
CHLORIDE SERPL-SCNC: 108 MMOL/L (ref 97–108)
CHOLEST SERPL-MCNC: 137 MG/DL
CO2 SERPL-SCNC: 30 MMOL/L (ref 21–32)
CREAT SERPL-MCNC: 1.17 MG/DL (ref 0.55–1.02)
ERYTHROCYTE [DISTWIDTH] IN BLOOD BY AUTOMATED COUNT: 14.4 % (ref 11.5–14.5)
EST. AVERAGE GLUCOSE BLD GHB EST-MCNC: 114 MG/DL
GLOBULIN SER CALC-MCNC: 3.2 G/DL (ref 2–4)
GLUCOSE SERPL-MCNC: 90 MG/DL (ref 65–100)
HBA1C MFR BLD: 5.6 % (ref 4–5.6)
HCT VFR BLD AUTO: 36.6 % (ref 35–47)
HDLC SERPL-MCNC: 56 MG/DL
HDLC SERPL: 2.4 (ref 0–5)
HGB BLD-MCNC: 11.4 G/DL (ref 11.5–16)
LDLC SERPL CALC-MCNC: 62.8 MG/DL (ref 0–100)
MCH RBC QN AUTO: 27.5 PG (ref 26–34)
MCHC RBC AUTO-ENTMCNC: 31.1 G/DL (ref 30–36.5)
MCV RBC AUTO: 88.2 FL (ref 80–99)
NRBC # BLD: 0 K/UL (ref 0–0.01)
NRBC BLD-RTO: 0 PER 100 WBC
PLATELET # BLD AUTO: 177 K/UL (ref 150–400)
PMV BLD AUTO: 11.8 FL (ref 8.9–12.9)
POTASSIUM SERPL-SCNC: 3.8 MMOL/L (ref 3.5–5.1)
PROT SERPL-MCNC: 6.8 G/DL (ref 6.4–8.2)
RBC # BLD AUTO: 4.15 M/UL (ref 3.8–5.2)
SODIUM SERPL-SCNC: 142 MMOL/L (ref 136–145)
TRIGL SERPL-MCNC: 91 MG/DL
VLDLC SERPL CALC-MCNC: 18.2 MG/DL
WBC # BLD AUTO: 5.9 K/UL (ref 3.6–11)

## 2024-12-18 RX ORDER — NYSTATIN 100000 U/G
CREAM TOPICAL
Qty: 30 G | Refills: 0 | Status: SHIPPED | OUTPATIENT
Start: 2024-12-18

## 2024-12-18 RX ORDER — LISINOPRIL AND HYDROCHLOROTHIAZIDE 12.5; 2 MG/1; MG/1
2 TABLET ORAL DAILY
Qty: 180 TABLET | Refills: 1 | Status: SHIPPED | OUTPATIENT
Start: 2024-12-18

## 2024-12-18 RX ORDER — CLOBETASOL PROPIONATE 0.5 MG/G
CREAM TOPICAL
Qty: 15 G | Refills: 0 | Status: SHIPPED | OUTPATIENT
Start: 2024-12-18

## 2024-12-18 NOTE — RESULT ENCOUNTER NOTE
Attempted to reach patient. Vm full. Unable to leave message    Per Dr. Gonzalez  Tell pt mild kidney impairment--hydrate more by 1 glass per day. No nsaids.   Anemia has improved-continue oral iron 1 tab daily   Normal glucose liver and cholesterol levels-continue meds and diet

## 2025-03-27 ENCOUNTER — PROCEDURE VISIT (OUTPATIENT)
Age: 85
End: 2025-03-27
Payer: MEDICARE

## 2025-03-27 ENCOUNTER — OFFICE VISIT (OUTPATIENT)
Age: 85
End: 2025-03-27
Payer: MEDICARE

## 2025-03-27 DIAGNOSIS — R41.3 MEMORY LOSS: ICD-10-CM

## 2025-03-27 DIAGNOSIS — R41.9 NEUROCOGNITIVE DISORDER: ICD-10-CM

## 2025-03-27 DIAGNOSIS — Z86.73 HISTORY OF CVA (CEREBROVASCULAR ACCIDENT): Primary | ICD-10-CM

## 2025-03-27 PROCEDURE — 96133 NRPSYC TST EVAL PHYS/QHP EA: CPT | Performed by: CLINICAL NEUROPSYCHOLOGIST

## 2025-03-27 PROCEDURE — 96138 PSYCL/NRPSYC TECH 1ST: CPT | Performed by: CLINICAL NEUROPSYCHOLOGIST

## 2025-03-27 PROCEDURE — 96132 NRPSYC TST EVAL PHYS/QHP 1ST: CPT | Performed by: CLINICAL NEUROPSYCHOLOGIST

## 2025-03-27 PROCEDURE — 96139 PSYCL/NRPSYC TST TECH EA: CPT | Performed by: CLINICAL NEUROPSYCHOLOGIST

## 2025-04-14 RX ORDER — CLOPIDOGREL BISULFATE 75 MG/1
75 TABLET ORAL DAILY
Qty: 90 TABLET | Refills: 3 | Status: SHIPPED | OUTPATIENT
Start: 2025-04-14

## 2025-04-14 RX ORDER — ATORVASTATIN CALCIUM 40 MG/1
40 TABLET, FILM COATED ORAL DAILY
Qty: 90 TABLET | Refills: 3 | Status: SHIPPED | OUTPATIENT
Start: 2025-04-14

## 2025-04-15 ENCOUNTER — OFFICE VISIT (OUTPATIENT)
Age: 85
End: 2025-04-15
Payer: MEDICARE

## 2025-04-15 DIAGNOSIS — Z86.73 HISTORY OF CVA (CEREBROVASCULAR ACCIDENT): Primary | ICD-10-CM

## 2025-04-15 DIAGNOSIS — R41.9 NEUROCOGNITIVE DISORDER: ICD-10-CM

## 2025-04-15 PROCEDURE — 96132 NRPSYC TST EVAL PHYS/QHP 1ST: CPT | Performed by: CLINICAL NEUROPSYCHOLOGIST

## 2025-04-15 NOTE — PROGRESS NOTES
Chief complaint: Patient presented for review of previously completed neuropsychological evaluation and discussion of impressions/recommendations.    Prior to seeing the patient for today's visit, I reviewed pertinent records, including the previously completed report, the records in Epic, and any updated visits from other providers since the patient's last visit.    I provided feedback services related to the previously completed report. Attendees included: Patient. Education was provided regarding my diagnostic impressions, and we discussed treatment plan/options. Attendees were provided with the opportunity to ask questions, which were answered to the best of my ability.    We discussed, in detail, the following:  Reviewed findings from evaluation including test results, diagnosis, and suspected contributing factors  Discussed recommendations outlined in report  Answered questions to the best of my ability    The patient needs to:   Follow up with referring provider for ongoing management, Use practical strategies to compensate for cognitive weaknesses (See handout), Emphasize modifiable risk and protective factors for cognitive functioning (e.g., exercise, diet, cognitive stimulation; see handout), and Follow up in 12 months    The patient had the following reactions to recommendations: Patient reported understanding results and recommendations    ASSESSMENT:   Diagnosis Orders   1. History of CVA (cerebrovascular accident)        2. Neurocognitive disorder            TIME SPENT PROVIDING SERVICES:   31 minutes     BILLING:  96132 x 1 Units    *Code 60363 Neuropsychological testing evaluation services include: Integration of patient data, interpretation of standardized test results and clinical data, clinical decision-making, treatment planning and report, and interactive feedback to the patient, family member(s) or caregiver(s), when performed.

## 2025-04-15 NOTE — PROGRESS NOTES
Neuropsychological Evaluation Report      Patient Name: Natasha Li  YOB: 1940    Age: 85 y.o.  Date of Intake: 3/27/2025   Education: 12 Date of Testing: 3/27/2025   Gender: Female Ethnicity: Black     Referring Provider: ELOISA Hsieh     REASON FOR REFERRAL AND EVALUATION PROCEDURES:  Natasha Li  was referred for neuropsychological evaluation by her Neurology Provider to obtain a quantitative assessment of her current level of neurocognitive functioning, assist in differential diagnosis, and aid in individualized treatment planning. The patient understood the rationale and procedures for evaluation, as well as the limits to confidentiality, and they agreed to participate. The patient consented to have this report made available to her  treating providers through her  electronic medical records. This evaluation was completed with the patient by Lloyd Tiwari PsyD with the exception of testing by technician, which was completed by SAHRA Garcia under the supervision of Dr. Tiwari.  History Sources: Patient, Relative (daughter), Medical Record, and Test Data    SUMMARY AND IMPRESSION:  The following section is a summary of the patient's pertinent test results and impressions. A more thorough review of the patient's background and test scores can be found below.    The patient's neuropsychological evaluation revealed moderate impairment on tests of visual-spatial perception/construction and test of visual learning/memory.  Performances across verbal learning/memory and other cognitive domains (processing speed, attention/working memory, executive functioning, expressive language) were within normal limits.  Performance on a simulated pillbox organization test was impaired but simulated bill payment and practical judgment were within normal limits.  Brief assessment of psychopathology did not reveal clinical symptoms of depression or anxiety.    With the patient being right handed, it

## 2025-05-12 DIAGNOSIS — R52 PAIN: ICD-10-CM

## 2025-05-12 DIAGNOSIS — G62.9 NEUROPATHY: ICD-10-CM

## 2025-05-13 RX ORDER — GABAPENTIN 300 MG/1
CAPSULE ORAL
Qty: 270 CAPSULE | Refills: 1 | Status: SHIPPED | OUTPATIENT
Start: 2025-05-13 | End: 2025-08-30

## 2025-05-13 RX ORDER — GABAPENTIN 100 MG/1
CAPSULE ORAL
Qty: 90 CAPSULE | Refills: 3 | Status: SHIPPED | OUTPATIENT
Start: 2025-05-13 | End: 2025-11-09

## 2025-06-19 ENCOUNTER — OFFICE VISIT (OUTPATIENT)
Age: 85
End: 2025-06-19
Payer: MEDICARE

## 2025-06-19 VITALS
WEIGHT: 185 LBS | HEIGHT: 62 IN | DIASTOLIC BLOOD PRESSURE: 88 MMHG | HEART RATE: 70 BPM | OXYGEN SATURATION: 99 % | SYSTOLIC BLOOD PRESSURE: 118 MMHG | TEMPERATURE: 98.1 F | BODY MASS INDEX: 34.04 KG/M2 | RESPIRATION RATE: 20 BRPM

## 2025-06-19 DIAGNOSIS — Z86.73 HISTORY OF CEREBROVASCULAR ACCIDENT: ICD-10-CM

## 2025-06-19 DIAGNOSIS — D64.9 MILD ANEMIA: ICD-10-CM

## 2025-06-19 DIAGNOSIS — R73.03 PREDIABETES: ICD-10-CM

## 2025-06-19 DIAGNOSIS — G31.84 MILD COGNITIVE IMPAIRMENT: ICD-10-CM

## 2025-06-19 DIAGNOSIS — R94.4 ABNORMAL RENAL FUNCTION TEST: ICD-10-CM

## 2025-06-19 DIAGNOSIS — R91.8 LUNG NODULES: Primary | ICD-10-CM

## 2025-06-19 DIAGNOSIS — E78.5 HYPERLIPIDEMIA, UNSPECIFIED HYPERLIPIDEMIA TYPE: ICD-10-CM

## 2025-06-19 DIAGNOSIS — I10 HYPERTENSION, UNSPECIFIED TYPE: ICD-10-CM

## 2025-06-19 PROCEDURE — 99214 OFFICE O/P EST MOD 30 MIN: CPT | Performed by: INTERNAL MEDICINE

## 2025-06-19 PROCEDURE — 3074F SYST BP LT 130 MM HG: CPT | Performed by: INTERNAL MEDICINE

## 2025-06-19 PROCEDURE — 1090F PRES/ABSN URINE INCON ASSESS: CPT | Performed by: INTERNAL MEDICINE

## 2025-06-19 PROCEDURE — 1159F MED LIST DOCD IN RCRD: CPT | Performed by: INTERNAL MEDICINE

## 2025-06-19 PROCEDURE — G8399 PT W/DXA RESULTS DOCUMENT: HCPCS | Performed by: INTERNAL MEDICINE

## 2025-06-19 PROCEDURE — 3079F DIAST BP 80-89 MM HG: CPT | Performed by: INTERNAL MEDICINE

## 2025-06-19 PROCEDURE — G8417 CALC BMI ABV UP PARAM F/U: HCPCS | Performed by: INTERNAL MEDICINE

## 2025-06-19 PROCEDURE — G8427 DOCREV CUR MEDS BY ELIG CLIN: HCPCS | Performed by: INTERNAL MEDICINE

## 2025-06-19 PROCEDURE — 1123F ACP DISCUSS/DSCN MKR DOCD: CPT | Performed by: INTERNAL MEDICINE

## 2025-06-19 PROCEDURE — 1036F TOBACCO NON-USER: CPT | Performed by: INTERNAL MEDICINE

## 2025-06-19 SDOH — ECONOMIC STABILITY: FOOD INSECURITY: WITHIN THE PAST 12 MONTHS, THE FOOD YOU BOUGHT JUST DIDN'T LAST AND YOU DIDN'T HAVE MONEY TO GET MORE.: NEVER TRUE

## 2025-06-19 SDOH — ECONOMIC STABILITY: FOOD INSECURITY: WITHIN THE PAST 12 MONTHS, YOU WORRIED THAT YOUR FOOD WOULD RUN OUT BEFORE YOU GOT MONEY TO BUY MORE.: NEVER TRUE

## 2025-06-19 ASSESSMENT — PATIENT HEALTH QUESTIONNAIRE - PHQ9
SUM OF ALL RESPONSES TO PHQ QUESTIONS 1-9: 0
7. TROUBLE CONCENTRATING ON THINGS, SUCH AS READING THE NEWSPAPER OR WATCHING TELEVISION: NOT AT ALL
6. FEELING BAD ABOUT YOURSELF - OR THAT YOU ARE A FAILURE OR HAVE LET YOURSELF OR YOUR FAMILY DOWN: NOT AT ALL
10. IF YOU CHECKED OFF ANY PROBLEMS, HOW DIFFICULT HAVE THESE PROBLEMS MADE IT FOR YOU TO DO YOUR WORK, TAKE CARE OF THINGS AT HOME, OR GET ALONG WITH OTHER PEOPLE: NOT DIFFICULT AT ALL
SUM OF ALL RESPONSES TO PHQ QUESTIONS 1-9: 0
8. MOVING OR SPEAKING SO SLOWLY THAT OTHER PEOPLE COULD HAVE NOTICED. OR THE OPPOSITE, BEING SO FIGETY OR RESTLESS THAT YOU HAVE BEEN MOVING AROUND A LOT MORE THAN USUAL: NOT AT ALL
3. TROUBLE FALLING OR STAYING ASLEEP: NOT AT ALL
4. FEELING TIRED OR HAVING LITTLE ENERGY: NOT AT ALL
SUM OF ALL RESPONSES TO PHQ QUESTIONS 1-9: 0
2. FEELING DOWN, DEPRESSED OR HOPELESS: NOT AT ALL
5. POOR APPETITE OR OVEREATING: NOT AT ALL
9. THOUGHTS THAT YOU WOULD BE BETTER OFF DEAD, OR OF HURTING YOURSELF: NOT AT ALL
1. LITTLE INTEREST OR PLEASURE IN DOING THINGS: NOT AT ALL
SUM OF ALL RESPONSES TO PHQ QUESTIONS 1-9: 0

## 2025-06-19 NOTE — PROGRESS NOTES
HISTORY OF PRESENT ILLNESS   Natasha Li   is a 85 y.o.  female.  FU  left MCA CVA in 2023 ---multiple infarcts left frontal, parietal and posterior temporal obes ( -right arm weakness)  HTN HLD prediabetes osteopenia of hip and spine , left sciatica , lung nodule-- 6mm largest 8/2023 ---here with daughter  CARD Dr Sparks-had fu for CVA and HTN---ILR recommended due to multiple infarcts. ILR inserted afew months with EP Dr Sim    Saw Neuropsych for memory loss s/p CVA--testing not c/w neurodegen d/o and memory loss not due to CVA. Moderate impairment on test. Advised to stay active . Fu next year    Right arm weakness-improved per pt    Last OV    Neuro-had fu in July-on plavix. Still has some right hand and wrist weakness, using cane for balance  CARD Dr Sparks-has fu in July-neg NST last year. Remote home BP monitoring. Had neg EVR and no carotid dz in 2023     Recent EGD/colon for mild RALEIGH-hemorrhoids, mild esophagitis and antritis-Dr Barclay  On oral iron     Last LDL 53 on statin     Stable tiny lung nodules on chest CT 8/2024     Some recent headaches     Increased left sciatica  Patient Active Problem List    Diagnosis Date Noted    Vitamin D deficiency 07/18/2024    Paresthesia and pain of right extremity 07/18/2024    History of CVA (cerebrovascular accident) 07/18/2024    Memory loss 07/18/2024    History of stroke 05/21/2024    Sciatica 09/05/2023    CVA (cerebral vascular accident) (HCC) 02/11/2023    TIA (transient ischemic attack) 02/10/2023    Bilateral carotid artery stenosis 02/10/2023    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

## 2025-06-19 NOTE — PROGRESS NOTES
Have you been to the ER, urgent care clinic since your last visit?  Hospitalized since your last visit?   No.     Have you seen or consulted any other health care providers outside our system since your last visit?   Dr. Tiwari: see encounter  Dr. Dr. Jacky Balderas see encounter

## 2025-06-20 LAB
ANION GAP SERPL CALC-SCNC: 6 MMOL/L (ref 2–12)
BUN SERPL-MCNC: 15 MG/DL (ref 6–20)
BUN/CREAT SERPL: 15 (ref 12–20)
CALCIUM SERPL-MCNC: 10 MG/DL (ref 8.5–10.1)
CHLORIDE SERPL-SCNC: 105 MMOL/L (ref 97–108)
CO2 SERPL-SCNC: 30 MMOL/L (ref 21–32)
CREAT SERPL-MCNC: 0.98 MG/DL (ref 0.55–1.02)
ERYTHROCYTE [DISTWIDTH] IN BLOOD BY AUTOMATED COUNT: 14.6 % (ref 11.5–14.5)
GLUCOSE SERPL-MCNC: 93 MG/DL (ref 65–100)
HCT VFR BLD AUTO: 37.3 % (ref 35–47)
HGB BLD-MCNC: 11.8 G/DL (ref 11.5–16)
MCH RBC QN AUTO: 27.4 PG (ref 26–34)
MCHC RBC AUTO-ENTMCNC: 31.6 G/DL (ref 30–36.5)
MCV RBC AUTO: 86.7 FL (ref 80–99)
NRBC # BLD: 0 K/UL (ref 0–0.01)
NRBC BLD-RTO: 0 PER 100 WBC
PLATELET # BLD AUTO: 239 K/UL (ref 150–400)
PMV BLD AUTO: 11.7 FL (ref 8.9–12.9)
POTASSIUM SERPL-SCNC: 3.8 MMOL/L (ref 3.5–5.1)
RBC # BLD AUTO: 4.3 M/UL (ref 3.8–5.2)
SODIUM SERPL-SCNC: 141 MMOL/L (ref 136–145)
WBC # BLD AUTO: 5.5 K/UL (ref 3.6–11)

## 2025-06-23 ENCOUNTER — RESULTS FOLLOW-UP (OUTPATIENT)
Age: 85
End: 2025-06-23

## 2025-07-11 ENCOUNTER — HOSPITAL ENCOUNTER (OUTPATIENT)
Facility: HOSPITAL | Age: 85
Discharge: HOME | End: 2025-07-14
Attending: INTERNAL MEDICINE
Payer: MEDICARE

## 2025-07-11 DIAGNOSIS — R91.8 LUNG NODULES: ICD-10-CM

## 2025-07-11 PROCEDURE — 71250 CT THORAX DX C-: CPT

## 2025-07-18 ENCOUNTER — OFFICE VISIT (OUTPATIENT)
Age: 85
End: 2025-07-18
Payer: MEDICARE

## 2025-07-18 VITALS
HEART RATE: 68 BPM | HEIGHT: 62 IN | DIASTOLIC BLOOD PRESSURE: 80 MMHG | RESPIRATION RATE: 18 BRPM | BODY MASS INDEX: 34.3 KG/M2 | OXYGEN SATURATION: 97 % | SYSTOLIC BLOOD PRESSURE: 138 MMHG | WEIGHT: 186.4 LBS

## 2025-07-18 DIAGNOSIS — R20.2 PARESTHESIA AND PAIN OF RIGHT EXTREMITY: ICD-10-CM

## 2025-07-18 DIAGNOSIS — Z86.73 HISTORY OF CVA (CEREBROVASCULAR ACCIDENT): ICD-10-CM

## 2025-07-18 DIAGNOSIS — G44.229 CHRONIC TENSION-TYPE HEADACHE, NOT INTRACTABLE: Primary | ICD-10-CM

## 2025-07-18 DIAGNOSIS — M79.609 PARESTHESIA AND PAIN OF RIGHT EXTREMITY: ICD-10-CM

## 2025-07-18 DIAGNOSIS — R41.9 NEUROCOGNITIVE DISORDER: ICD-10-CM

## 2025-07-18 PROCEDURE — G8427 DOCREV CUR MEDS BY ELIG CLIN: HCPCS

## 2025-07-18 PROCEDURE — 3075F SYST BP GE 130 - 139MM HG: CPT

## 2025-07-18 PROCEDURE — 1126F AMNT PAIN NOTED NONE PRSNT: CPT

## 2025-07-18 PROCEDURE — G8399 PT W/DXA RESULTS DOCUMENT: HCPCS

## 2025-07-18 PROCEDURE — 99215 OFFICE O/P EST HI 40 MIN: CPT

## 2025-07-18 PROCEDURE — 1123F ACP DISCUSS/DSCN MKR DOCD: CPT

## 2025-07-18 PROCEDURE — 1159F MED LIST DOCD IN RCRD: CPT

## 2025-07-18 PROCEDURE — G8417 CALC BMI ABV UP PARAM F/U: HCPCS

## 2025-07-18 PROCEDURE — 1090F PRES/ABSN URINE INCON ASSESS: CPT

## 2025-07-18 PROCEDURE — 1036F TOBACCO NON-USER: CPT

## 2025-07-18 PROCEDURE — 3079F DIAST BP 80-89 MM HG: CPT

## 2025-07-18 PROCEDURE — 1160F RVW MEDS BY RX/DR IN RCRD: CPT

## 2025-07-18 ASSESSMENT — ENCOUNTER SYMPTOMS
RESPIRATORY NEGATIVE: 1
GASTROINTESTINAL NEGATIVE: 1
ALLERGIC/IMMUNOLOGIC NEGATIVE: 1
EYES NEGATIVE: 1

## 2025-07-18 ASSESSMENT — PATIENT HEALTH QUESTIONNAIRE - PHQ9
SUM OF ALL RESPONSES TO PHQ QUESTIONS 1-9: 0
2. FEELING DOWN, DEPRESSED OR HOPELESS: NOT AT ALL
1. LITTLE INTEREST OR PLEASURE IN DOING THINGS: NOT AT ALL
SUM OF ALL RESPONSES TO PHQ QUESTIONS 1-9: 0

## 2025-07-18 NOTE — PROGRESS NOTES
[facility]  In Office FOLLOW-UP VISIT         Natasha Li is a 85 y.o. female who presents today for the following:  Chief Complaint   Patient presents with    Cerebrovascular Accident     Yearly fu appt for CVA.  No concerns today.         ASSESSMENT AND PLAN  Patient is known to this practice.  Chart and history reviewed in detail at today's office visit.    1. Chronic tension-type headache, not intractable  Assessment & Plan:  Patient reports the headaches have been persistent since her stroke in 2023, occurring 2 to 3 days a week, rated 10 out of 10 in intensity, and described as throbbing.     They are localized to both temples without radiation. Tylenol provides relief within 10-15 minutes.     There are no associated symptoms such as nausea, vomiting, sensitivity to light or sounds, or vision issues. The headaches do not present with warning signs.     Given the effectiveness of Tylenol and the absence of migraine characteristics, no additional medication is deemed necessary at this time. If she finds herself needing to take Tylenol more than once a day or more than 3 days a week, she should inform us  immediately.    Dehydration can cause headache as well. She was advised to optimize  her water intake to prevent dehydration, which could be contributing to her headaches.    We discussed the importance of a proper diet including plenty of fruits and vegetables as this can help with headache prevention.    We discussed the importance of staying hydrated and drinking at least 32 to 64 ounces of water daily as this can be a cause of tension type headaches.    We also discussed the importance of keeping a headache journal or log to identify triggers.    We discussed the importance of sleep hygiene and attempting to get at least 6 to 8 hours of sleep daily to help with headache prevention.    2. Neurocognitive disorder  Assessment & Plan:  Stable without any worsening of

## 2025-07-18 NOTE — PATIENT INSTRUCTIONS
As per our discussion,    Overall, you did really well today    As for your headaches, based on the description, it does sound like tension headaches and less likely migraines.  1 factors that may contribute to your headaches include dehydration.    I will not start you on any other medication given your symptoms have been stable on Tylenol as needed.    Again, please if your symptoms worsen and Tylenol no longer works, please let us know.    As we discussed,  The importance of a proper diet including plenty of fruits and vegetables as this can help with headache prevention.  The importance of staying hydrated and drinking at least 32 to 64 ounces of water daily as this can be a cause of tension type headaches.  The importance of keeping a headache journal or log to identify triggers.  The importance of sleep hygiene and attempting to get at least 6 to 8 hours of sleep daily to help with headache prevention.    You seemed stable without any recurrence of stroke symptoms.    Continue on Plavix 75 mg atorvastatin 40 mg daily.    Continue to work to all of your comorbid conditions as managed by PCP and other specialists as appropriate    RECOMMENDATIONS:  - BP goal is less than 140/90  - Goal HbA1c is less than 7.   - LDL less than 70     Stroke education was provided today in regards to risk factors for stroke and lifestyle modifications to help minimize the risk of future stroke.    This included medication compliance, regular follow up with primary care physician,  and healthy lifestyle habits (nutrition/exercise).    As for your memory,   Please continue to take vitamin B12 and vitamin D as prescribed.    I encourage you to continue to use your CPAP machine as ordered.    I encouraged you to engage in approximately 2.5 hours of physician approved physical activity on a weekly basis.    To maintain a healthy diet. Also was informed of the Mediterranean diet, which has been associated with reduced risk for

## 2025-07-20 PROBLEM — G44.229 CHRONIC TENSION-TYPE HEADACHE, NOT INTRACTABLE: Status: ACTIVE | Noted: 2025-07-20

## 2025-07-20 PROBLEM — R41.9 NEUROCOGNITIVE DISORDER: Status: ACTIVE | Noted: 2025-07-20

## 2025-07-21 ENCOUNTER — TELEPHONE (OUTPATIENT)
Age: 85
End: 2025-07-21

## 2025-07-21 NOTE — TELEPHONE ENCOUNTER
Patient called stating that when patient had her appt with us on 7/18, she was told she was supposed to have an MRI done and given an order.    Patient realized after our office was closed that she was given another patient's order (Maida Dempsey).    Patient asked if she is supposed to have an MRI and if so, if we can get her order to her. Please advise. [phone]

## 2025-07-21 NOTE — TELEPHONE ENCOUNTER
I spoke with Mrs. Li.  We will be sending out a  to retreive the erroneous document.  I thanked her for reporting this to us.  She appreciated the phone call.

## 2025-07-23 PROBLEM — I63.9 CVA (CEREBRAL VASCULAR ACCIDENT) (HCC): Status: RESOLVED | Noted: 2023-02-11 | Resolved: 2025-07-23

## 2025-07-23 PROBLEM — I63.312 THROMBOTIC STROKE INVOLVING LEFT MIDDLE CEREBRAL ARTERY (HCC): Status: RESOLVED | Noted: 2023-02-10 | Resolved: 2025-07-23

## 2025-07-31 ENCOUNTER — TRANSCRIBE ORDERS (OUTPATIENT)
Facility: HOSPITAL | Age: 85
End: 2025-07-31

## 2025-07-31 DIAGNOSIS — Z12.31 VISIT FOR SCREENING MAMMOGRAM: Primary | ICD-10-CM

## 2025-09-02 RX ORDER — LISINOPRIL AND HYDROCHLOROTHIAZIDE 12.5; 2 MG/1; MG/1
2 TABLET ORAL DAILY
Qty: 180 TABLET | Refills: 1 | Status: SHIPPED | OUTPATIENT
Start: 2025-09-02

## (undated) DEVICE — KENDALL RADIOLUCENT FOAM MONITORING ELECTRODE RECTANGULAR SHAPE: Brand: KENDALL

## (undated) DEVICE — TIP SUCT TRNSPAR RIB SURF STD BLB RIG NVENT W/ 5IN1 CONN DYND50138] MEDLINE INDUSTRIES INC]

## (undated) DEVICE — NDL SPNE QNCKE 22GX5IN LF BLK --

## (undated) DEVICE — Device

## (undated) DEVICE — COVIDIEN KENDALL DL DISPOSABLE 3 LEAD SY: Brand: MEDLINE RENEWAL

## (undated) DEVICE — SKIN MARKER,REGULAR TIP WITH RULER AND LABELS: Brand: DEVON

## (undated) DEVICE — ADULT SPO2 SENSOR: Brand: NELLCOR

## (undated) DEVICE — POLYLINED TOWEL: Brand: CONVERTORS

## (undated) DEVICE — SOLIDIFIER MEDC 1200ML -- CONVERT TO 356117

## (undated) DEVICE — (D)PREP SKN CHLRAPRP APPL 26ML -- CONVERT TO ITEM 371833

## (undated) DEVICE — TOWEL SURG W17XL27IN STD BLU COT NONFENESTRATED PREWASHED

## (undated) DEVICE — CONTAINER SPEC 20 ML LID NEUT BUFF FORMALIN 10 % POLYPR STS

## (undated) DEVICE — SYR 5ML 1/5 GRAD LL NSAF LF --

## (undated) DEVICE — IV START KIT: Brand: MEDLINE

## (undated) DEVICE — SYR 10ML LUER LOK 1/5ML GRAD --

## (undated) DEVICE — NEEDLE HYPO 25GA L1.5IN BVL ORIENTED ECLIPSE

## (undated) DEVICE — SET EXTN PRIMING 0.59ML 8.5IN 1.55LB PRSS RATE MINIBOR PUR

## (undated) DEVICE — Z DISCONTINUED PER MEDLINE LINE GAS SAMPLING O2/CO2 LNG AD 13 FT NSL W/ TBNG FILTERLINE

## (undated) DEVICE — ELECTRODE PT RET AD L9FT HI MOIST COND ADH HYDRGEL CORDED

## (undated) DEVICE — NEONATAL-ADULT SPO2 SENSOR: Brand: NELLCOR

## (undated) DEVICE — 7" (18CM) APPX 0.31 ML, SMALLBORE EXT SET W/REMV MICROCLAVE™ CLEAR, CLAMP, ROTATING LUER: Brand: ICU MEDICAL

## (undated) DEVICE — SET ADMIN 16ML TBNG L100IN 2 Y INJ SITE IV PIGGY BK DISP

## (undated) DEVICE — CUFF BLD PRSS AD CLTH SGL TB W/ BAYNT CONN ROUNDED CORNER

## (undated) DEVICE — NEEDLE HYPO 18GA L1.5IN PNK S STL HUB POLYPR SHLD REG BVL

## (undated) DEVICE — STERILE POLYISOPRENE POWDER-FREE SURGICAL GLOVES: Brand: PROTEXIS

## (undated) DEVICE — NEEDLE SPNL L4.75IN OD25GA QNCKE TYP SPINOCAN

## (undated) DEVICE — (D)SYR 10ML 1/5ML GRAD NSAF -- PKGING CHANGE USE ITEM 338027

## (undated) DEVICE — ENDOSCOPIC KIT COMPLIANCE ENDOKIT

## (undated) DEVICE — SYR 3ML LL TIP 1/10ML GRAD --

## (undated) DEVICE — BASIN EMSIS 16OZ GRAPHITE PLAS KID SHP MOLD GRAD FOR ORAL

## (undated) DEVICE — CATH IV AUTOGRD BC PNK 20GA 25 -- INSYTE

## (undated) DEVICE — 1200 GUARD II KIT W/5MM TUBE W/O VAC TUBE: Brand: GUARDIAN

## (undated) DEVICE — SET GRAV CK VLV NEEDLESS ST 3 GANGED 4WAY STPCOCK HI FLO 10

## (undated) DEVICE — TOWEL 4 PLY TISS 19X30 SUE WHT